# Patient Record
Sex: MALE | Race: WHITE | Employment: OTHER | URBAN - METROPOLITAN AREA
[De-identification: names, ages, dates, MRNs, and addresses within clinical notes are randomized per-mention and may not be internally consistent; named-entity substitution may affect disease eponyms.]

---

## 2017-01-03 ENCOUNTER — OFFICE VISIT (OUTPATIENT)
Dept: ORTHOPEDIC SURGERY | Age: 82
End: 2017-01-03

## 2017-01-03 VITALS
SYSTOLIC BLOOD PRESSURE: 121 MMHG | WEIGHT: 207 LBS | DIASTOLIC BLOOD PRESSURE: 78 MMHG | HEIGHT: 70 IN | BODY MASS INDEX: 29.63 KG/M2

## 2017-01-03 DIAGNOSIS — M24.562 CONTRACTURE OF LEFT KNEE: ICD-10-CM

## 2017-01-03 DIAGNOSIS — M25.469 EFFUSION OF KNEE, UNSPECIFIED LATERALITY: ICD-10-CM

## 2017-01-03 DIAGNOSIS — M17.12 PRIMARY OSTEOARTHRITIS OF LEFT KNEE: Primary | ICD-10-CM

## 2017-01-03 DIAGNOSIS — M25.569 ACUTE KNEE PAIN, UNSPECIFIED LATERALITY: ICD-10-CM

## 2017-01-03 PROCEDURE — 99214 OFFICE O/P EST MOD 30 MIN: CPT | Performed by: INTERNAL MEDICINE

## 2017-01-03 PROCEDURE — 73562 X-RAY EXAM OF KNEE 3: CPT | Performed by: INTERNAL MEDICINE

## 2017-01-03 PROCEDURE — 20611 DRAIN/INJ JOINT/BURSA W/US: CPT | Performed by: INTERNAL MEDICINE

## 2017-01-17 ENCOUNTER — OFFICE VISIT (OUTPATIENT)
Dept: ORTHOPEDIC SURGERY | Age: 82
End: 2017-01-17

## 2017-01-17 VITALS — HEIGHT: 70 IN | WEIGHT: 207 LBS | BODY MASS INDEX: 29.63 KG/M2

## 2017-01-17 DIAGNOSIS — M24.562 CONTRACTURE OF LEFT KNEE: ICD-10-CM

## 2017-01-17 DIAGNOSIS — S83.8X2D SPRAIN OF OTHER LIGAMENT OF LEFT KNEE, SUBSEQUENT ENCOUNTER: ICD-10-CM

## 2017-01-17 DIAGNOSIS — M17.12 PRIMARY OSTEOARTHRITIS OF LEFT KNEE: Primary | ICD-10-CM

## 2017-01-17 PROCEDURE — G8420 CALC BMI NORM PARAMETERS: HCPCS | Performed by: INTERNAL MEDICINE

## 2017-01-17 PROCEDURE — 4040F PNEUMOC VAC/ADMIN/RCVD: CPT | Performed by: INTERNAL MEDICINE

## 2017-01-17 PROCEDURE — G8427 DOCREV CUR MEDS BY ELIG CLIN: HCPCS | Performed by: INTERNAL MEDICINE

## 2017-01-17 PROCEDURE — 1123F ACP DISCUSS/DSCN MKR DOCD: CPT | Performed by: INTERNAL MEDICINE

## 2017-01-17 PROCEDURE — L1812 KO ELASTIC W/JOINTS PRE OTS: HCPCS | Performed by: INTERNAL MEDICINE

## 2017-01-17 PROCEDURE — 1036F TOBACCO NON-USER: CPT | Performed by: INTERNAL MEDICINE

## 2017-01-17 PROCEDURE — G8484 FLU IMMUNIZE NO ADMIN: HCPCS | Performed by: INTERNAL MEDICINE

## 2017-01-17 PROCEDURE — 99213 OFFICE O/P EST LOW 20 MIN: CPT | Performed by: INTERNAL MEDICINE

## 2017-01-19 ENCOUNTER — OFFICE VISIT (OUTPATIENT)
Dept: INTERNAL MEDICINE | Age: 82
End: 2017-01-19

## 2017-01-19 VITALS
BODY MASS INDEX: 29.41 KG/M2 | SYSTOLIC BLOOD PRESSURE: 116 MMHG | TEMPERATURE: 97.6 F | DIASTOLIC BLOOD PRESSURE: 54 MMHG | HEART RATE: 68 BPM | RESPIRATION RATE: 16 BRPM | WEIGHT: 205 LBS

## 2017-01-19 DIAGNOSIS — I48.0 PAROXYSMAL ATRIAL FIBRILLATION (HCC): ICD-10-CM

## 2017-01-19 DIAGNOSIS — M17.11 PRIMARY OSTEOARTHRITIS OF RIGHT KNEE: ICD-10-CM

## 2017-01-19 DIAGNOSIS — I10 HTN (HYPERTENSION), BENIGN: Primary | ICD-10-CM

## 2017-01-19 DIAGNOSIS — E78.00 PURE HYPERCHOLESTEROLEMIA: ICD-10-CM

## 2017-01-19 DIAGNOSIS — E03.9 ACQUIRED HYPOTHYROIDISM: ICD-10-CM

## 2017-01-19 DIAGNOSIS — D64.9 ANEMIA, UNSPECIFIED TYPE: Primary | ICD-10-CM

## 2017-01-19 DIAGNOSIS — F32.5 MAJOR DEPRESSIVE DISORDER WITH SINGLE EPISODE, IN FULL REMISSION (HCC): ICD-10-CM

## 2017-01-19 DIAGNOSIS — N18.30 CKD (CHRONIC KIDNEY DISEASE) STAGE 3, GFR 30-59 ML/MIN (HCC): ICD-10-CM

## 2017-01-19 LAB
ALBUMIN SERPL-MCNC: 4.1 G/DL (ref 3.4–5)
ALP BLD-CCNC: 49 U/L (ref 40–129)
ALT SERPL-CCNC: 12 U/L (ref 10–40)
ANION GAP SERPL CALCULATED.3IONS-SCNC: 13 MMOL/L (ref 3–16)
AST SERPL-CCNC: 17 U/L (ref 15–37)
BASOPHILS ABSOLUTE: 0.1 K/UL (ref 0–0.2)
BASOPHILS RELATIVE PERCENT: 0.9 %
BILIRUB SERPL-MCNC: 0.6 MG/DL (ref 0–1)
BILIRUBIN DIRECT: <0.2 MG/DL (ref 0–0.3)
BILIRUBIN, INDIRECT: NORMAL MG/DL (ref 0–1)
BUN BLDV-MCNC: 25 MG/DL (ref 7–20)
CALCIUM SERPL-MCNC: 9.3 MG/DL (ref 8.3–10.6)
CHLORIDE BLD-SCNC: 103 MMOL/L (ref 99–110)
CHOLESTEROL, TOTAL: 153 MG/DL (ref 0–199)
CO2: 26 MMOL/L (ref 21–32)
CREAT SERPL-MCNC: 1.4 MG/DL (ref 0.8–1.3)
DIGOXIN LEVEL: 1 NG/ML (ref 0.8–2)
EOSINOPHILS ABSOLUTE: 0.1 K/UL (ref 0–0.6)
EOSINOPHILS RELATIVE PERCENT: 1.5 %
FERRITIN: 116.1 NG/ML (ref 30–400)
GFR AFRICAN AMERICAN: 59
GFR NON-AFRICAN AMERICAN: 48
GLUCOSE BLD-MCNC: 81 MG/DL (ref 70–99)
HCT VFR BLD CALC: 36.1 % (ref 40.5–52.5)
HDLC SERPL-MCNC: 53 MG/DL (ref 40–60)
HEMOGLOBIN: 12 G/DL (ref 13.5–17.5)
IRON SATURATION: 21 % (ref 20–50)
IRON: 62 UG/DL (ref 59–158)
LDL CHOLESTEROL CALCULATED: 84 MG/DL
LYMPHOCYTES ABSOLUTE: 0.8 K/UL (ref 1–5.1)
LYMPHOCYTES RELATIVE PERCENT: 11.2 %
MCH RBC QN AUTO: 29.6 PG (ref 26–34)
MCHC RBC AUTO-ENTMCNC: 33.3 G/DL (ref 31–36)
MCV RBC AUTO: 88.7 FL (ref 80–100)
MONOCYTES ABSOLUTE: 0.7 K/UL (ref 0–1.3)
MONOCYTES RELATIVE PERCENT: 10.7 %
NEUTROPHILS ABSOLUTE: 5.3 K/UL (ref 1.7–7.7)
NEUTROPHILS RELATIVE PERCENT: 75.7 %
PDW BLD-RTO: 14.9 % (ref 12.4–15.4)
PHOSPHORUS: 2.9 MG/DL (ref 2.5–4.9)
PLATELET # BLD: 219 K/UL (ref 135–450)
PMV BLD AUTO: 9 FL (ref 5–10.5)
POTASSIUM SERPL-SCNC: 4.9 MMOL/L (ref 3.5–5.1)
RBC # BLD: 4.07 M/UL (ref 4.2–5.9)
SODIUM BLD-SCNC: 142 MMOL/L (ref 136–145)
TOTAL IRON BINDING CAPACITY: 292 UG/DL (ref 260–445)
TOTAL PROTEIN: 6.4 G/DL (ref 6.4–8.2)
TRIGL SERPL-MCNC: 78 MG/DL (ref 0–150)
TSH REFLEX: 1.35 UIU/ML (ref 0.27–4.2)
VLDLC SERPL CALC-MCNC: 16 MG/DL
WBC # BLD: 7 K/UL (ref 4–11)

## 2017-01-19 PROCEDURE — 36415 COLL VENOUS BLD VENIPUNCTURE: CPT | Performed by: INTERNAL MEDICINE

## 2017-01-19 PROCEDURE — 99214 OFFICE O/P EST MOD 30 MIN: CPT | Performed by: INTERNAL MEDICINE

## 2017-01-19 PROCEDURE — 4040F PNEUMOC VAC/ADMIN/RCVD: CPT | Performed by: INTERNAL MEDICINE

## 2017-01-19 PROCEDURE — G8484 FLU IMMUNIZE NO ADMIN: HCPCS | Performed by: INTERNAL MEDICINE

## 2017-01-19 PROCEDURE — G8420 CALC BMI NORM PARAMETERS: HCPCS | Performed by: INTERNAL MEDICINE

## 2017-01-19 PROCEDURE — 1036F TOBACCO NON-USER: CPT | Performed by: INTERNAL MEDICINE

## 2017-01-19 PROCEDURE — 1123F ACP DISCUSS/DSCN MKR DOCD: CPT | Performed by: INTERNAL MEDICINE

## 2017-01-19 PROCEDURE — G8427 DOCREV CUR MEDS BY ELIG CLIN: HCPCS | Performed by: INTERNAL MEDICINE

## 2017-02-07 ENCOUNTER — OFFICE VISIT (OUTPATIENT)
Dept: ORTHOPEDIC SURGERY | Age: 82
End: 2017-02-07

## 2017-02-07 DIAGNOSIS — M25.462 KNEE EFFUSION, LEFT: ICD-10-CM

## 2017-02-07 DIAGNOSIS — M17.12 PRIMARY OSTEOARTHRITIS OF ONE KNEE, LEFT: Primary | ICD-10-CM

## 2017-02-07 PROCEDURE — G8420 CALC BMI NORM PARAMETERS: HCPCS | Performed by: INTERNAL MEDICINE

## 2017-02-07 PROCEDURE — 99212 OFFICE O/P EST SF 10 MIN: CPT | Performed by: INTERNAL MEDICINE

## 2017-02-07 PROCEDURE — 20611 DRAIN/INJ JOINT/BURSA W/US: CPT | Performed by: INTERNAL MEDICINE

## 2017-02-07 PROCEDURE — 4040F PNEUMOC VAC/ADMIN/RCVD: CPT | Performed by: INTERNAL MEDICINE

## 2017-02-07 PROCEDURE — 1123F ACP DISCUSS/DSCN MKR DOCD: CPT | Performed by: INTERNAL MEDICINE

## 2017-02-07 PROCEDURE — G8428 CUR MEDS NOT DOCUMENT: HCPCS | Performed by: INTERNAL MEDICINE

## 2017-02-07 PROCEDURE — 1036F TOBACCO NON-USER: CPT | Performed by: INTERNAL MEDICINE

## 2017-02-07 PROCEDURE — G8484 FLU IMMUNIZE NO ADMIN: HCPCS | Performed by: INTERNAL MEDICINE

## 2017-02-08 PROBLEM — M25.462 KNEE EFFUSION, LEFT: Status: ACTIVE | Noted: 2017-02-08

## 2017-02-15 ENCOUNTER — OFFICE VISIT (OUTPATIENT)
Dept: ORTHOPEDIC SURGERY | Age: 82
End: 2017-02-15

## 2017-02-15 VITALS — HEIGHT: 70 IN | WEIGHT: 207 LBS | BODY MASS INDEX: 29.63 KG/M2

## 2017-02-15 DIAGNOSIS — M71.22 POPLITEAL SYNOVIAL CYST, LEFT: ICD-10-CM

## 2017-02-15 DIAGNOSIS — M17.12 PRIMARY OSTEOARTHRITIS OF LEFT KNEE: ICD-10-CM

## 2017-02-15 DIAGNOSIS — M25.462 KNEE EFFUSION, LEFT: ICD-10-CM

## 2017-02-15 DIAGNOSIS — M25.562 POSTERIOR KNEE PAIN, LEFT: Primary | ICD-10-CM

## 2017-02-15 PROCEDURE — 1123F ACP DISCUSS/DSCN MKR DOCD: CPT | Performed by: INTERNAL MEDICINE

## 2017-02-15 PROCEDURE — 99212 OFFICE O/P EST SF 10 MIN: CPT | Performed by: INTERNAL MEDICINE

## 2017-02-15 PROCEDURE — 1036F TOBACCO NON-USER: CPT | Performed by: INTERNAL MEDICINE

## 2017-02-15 PROCEDURE — 4040F PNEUMOC VAC/ADMIN/RCVD: CPT | Performed by: INTERNAL MEDICINE

## 2017-02-15 PROCEDURE — 20611 DRAIN/INJ JOINT/BURSA W/US: CPT | Performed by: INTERNAL MEDICINE

## 2017-02-15 PROCEDURE — G8427 DOCREV CUR MEDS BY ELIG CLIN: HCPCS | Performed by: INTERNAL MEDICINE

## 2017-02-15 PROCEDURE — 76882 US LMTD JT/FCL EVL NVASC XTR: CPT | Performed by: INTERNAL MEDICINE

## 2017-02-15 PROCEDURE — G8484 FLU IMMUNIZE NO ADMIN: HCPCS | Performed by: INTERNAL MEDICINE

## 2017-02-15 PROCEDURE — G8420 CALC BMI NORM PARAMETERS: HCPCS | Performed by: INTERNAL MEDICINE

## 2017-02-22 ENCOUNTER — OFFICE VISIT (OUTPATIENT)
Dept: ORTHOPEDIC SURGERY | Age: 82
End: 2017-02-22

## 2017-02-22 VITALS — HEIGHT: 70 IN | WEIGHT: 207.01 LBS | BODY MASS INDEX: 29.64 KG/M2

## 2017-02-22 DIAGNOSIS — M17.12 PRIMARY OSTEOARTHRITIS OF LEFT KNEE: Primary | ICD-10-CM

## 2017-02-22 DIAGNOSIS — M25.462 KNEE EFFUSION, LEFT: ICD-10-CM

## 2017-02-22 DIAGNOSIS — M25.562 POSTERIOR LEFT KNEE PAIN: ICD-10-CM

## 2017-02-22 PROCEDURE — 1123F ACP DISCUSS/DSCN MKR DOCD: CPT | Performed by: INTERNAL MEDICINE

## 2017-02-22 PROCEDURE — 4040F PNEUMOC VAC/ADMIN/RCVD: CPT | Performed by: INTERNAL MEDICINE

## 2017-02-22 PROCEDURE — G8484 FLU IMMUNIZE NO ADMIN: HCPCS | Performed by: INTERNAL MEDICINE

## 2017-02-22 PROCEDURE — 1036F TOBACCO NON-USER: CPT | Performed by: INTERNAL MEDICINE

## 2017-02-22 PROCEDURE — 99213 OFFICE O/P EST LOW 20 MIN: CPT | Performed by: INTERNAL MEDICINE

## 2017-02-22 PROCEDURE — G8427 DOCREV CUR MEDS BY ELIG CLIN: HCPCS | Performed by: INTERNAL MEDICINE

## 2017-02-22 PROCEDURE — 20611 DRAIN/INJ JOINT/BURSA W/US: CPT | Performed by: INTERNAL MEDICINE

## 2017-02-22 PROCEDURE — G8420 CALC BMI NORM PARAMETERS: HCPCS | Performed by: INTERNAL MEDICINE

## 2017-02-22 RX ORDER — MELOXICAM 15 MG/1
15 TABLET ORAL DAILY
Qty: 30 TABLET | Refills: 2 | Status: SHIPPED | OUTPATIENT
Start: 2017-02-22 | End: 2017-04-05 | Stop reason: ALTCHOICE

## 2017-03-02 ENCOUNTER — OFFICE VISIT (OUTPATIENT)
Dept: ORTHOPEDIC SURGERY | Age: 82
End: 2017-03-02

## 2017-03-02 VITALS — HEIGHT: 70 IN | BODY MASS INDEX: 29.64 KG/M2 | WEIGHT: 207.01 LBS

## 2017-03-02 DIAGNOSIS — M17.12 PRIMARY OSTEOARTHRITIS OF LEFT KNEE: Primary | ICD-10-CM

## 2017-03-02 DIAGNOSIS — M25.462 EFFUSION OF LEFT KNEE: ICD-10-CM

## 2017-03-02 PROBLEM — M25.562 POSTERIOR LEFT KNEE PAIN: Status: RESOLVED | Noted: 2017-02-22 | Resolved: 2017-03-02

## 2017-03-02 PROCEDURE — 1123F ACP DISCUSS/DSCN MKR DOCD: CPT | Performed by: INTERNAL MEDICINE

## 2017-03-02 PROCEDURE — 1036F TOBACCO NON-USER: CPT | Performed by: INTERNAL MEDICINE

## 2017-03-02 PROCEDURE — G8420 CALC BMI NORM PARAMETERS: HCPCS | Performed by: INTERNAL MEDICINE

## 2017-03-02 PROCEDURE — G8484 FLU IMMUNIZE NO ADMIN: HCPCS | Performed by: INTERNAL MEDICINE

## 2017-03-02 PROCEDURE — 99213 OFFICE O/P EST LOW 20 MIN: CPT | Performed by: INTERNAL MEDICINE

## 2017-03-02 PROCEDURE — G8427 DOCREV CUR MEDS BY ELIG CLIN: HCPCS | Performed by: INTERNAL MEDICINE

## 2017-03-02 PROCEDURE — 4040F PNEUMOC VAC/ADMIN/RCVD: CPT | Performed by: INTERNAL MEDICINE

## 2017-03-15 ENCOUNTER — HOSPITAL ENCOUNTER (OUTPATIENT)
Dept: PHYSICAL THERAPY | Age: 82
Discharge: HOME | End: 2017-03-31
Admitting: INTERNAL MEDICINE

## 2017-03-17 ENCOUNTER — HOSPITAL ENCOUNTER (OUTPATIENT)
Dept: PHYSICAL THERAPY | Age: 82
Discharge: HOME | End: 2017-03-17
Admitting: INTERNAL MEDICINE

## 2017-03-22 ENCOUNTER — HOSPITAL ENCOUNTER (OUTPATIENT)
Dept: PHYSICAL THERAPY | Age: 82
Discharge: HOME | End: 2017-03-22
Admitting: INTERNAL MEDICINE

## 2017-03-24 ENCOUNTER — HOSPITAL ENCOUNTER (OUTPATIENT)
Dept: PHYSICAL THERAPY | Age: 82
Discharge: HOME | End: 2017-03-24
Admitting: INTERNAL MEDICINE

## 2017-03-29 ENCOUNTER — HOSPITAL ENCOUNTER (OUTPATIENT)
Dept: PHYSICAL THERAPY | Age: 82
Discharge: HOME | End: 2017-03-29
Admitting: INTERNAL MEDICINE

## 2017-03-31 ENCOUNTER — HOSPITAL ENCOUNTER (OUTPATIENT)
Dept: PHYSICAL THERAPY | Age: 82
Discharge: HOME | End: 2017-03-31
Admitting: INTERNAL MEDICINE

## 2017-04-05 ENCOUNTER — OFFICE VISIT (OUTPATIENT)
Dept: ORTHOPEDIC SURGERY | Age: 82
End: 2017-04-05

## 2017-04-05 VITALS — HEIGHT: 70 IN | BODY MASS INDEX: 29.64 KG/M2 | WEIGHT: 207.01 LBS

## 2017-04-05 DIAGNOSIS — M24.562 CONTRACTURE OF LEFT KNEE: ICD-10-CM

## 2017-04-05 DIAGNOSIS — M25.562 LEFT KNEE PAIN, UNSPECIFIED CHRONICITY: Primary | ICD-10-CM

## 2017-04-05 DIAGNOSIS — M17.11 PRIMARY OSTEOARTHRITIS OF RIGHT KNEE: ICD-10-CM

## 2017-04-05 PROCEDURE — 99213 OFFICE O/P EST LOW 20 MIN: CPT | Performed by: INTERNAL MEDICINE

## 2017-04-05 PROCEDURE — 4040F PNEUMOC VAC/ADMIN/RCVD: CPT | Performed by: INTERNAL MEDICINE

## 2017-04-05 PROCEDURE — G8427 DOCREV CUR MEDS BY ELIG CLIN: HCPCS | Performed by: INTERNAL MEDICINE

## 2017-04-05 PROCEDURE — 1123F ACP DISCUSS/DSCN MKR DOCD: CPT | Performed by: INTERNAL MEDICINE

## 2017-04-05 PROCEDURE — 1036F TOBACCO NON-USER: CPT | Performed by: INTERNAL MEDICINE

## 2017-04-05 PROCEDURE — G8420 CALC BMI NORM PARAMETERS: HCPCS | Performed by: INTERNAL MEDICINE

## 2017-04-05 RX ORDER — METHYLPREDNISOLONE 4 MG/1
TABLET ORAL
Qty: 1 KIT | Refills: 0 | Status: SHIPPED | OUTPATIENT
Start: 2017-04-05 | End: 2017-05-04 | Stop reason: CLARIF

## 2017-04-07 PROBLEM — M24.569 CONTRACTURE OF KNEE JOINT: Status: ACTIVE | Noted: 2017-04-07

## 2017-04-26 ENCOUNTER — OFFICE VISIT (OUTPATIENT)
Dept: ORTHOPEDIC SURGERY | Age: 82
End: 2017-04-26

## 2017-04-26 VITALS — WEIGHT: 207.01 LBS | HEIGHT: 70 IN | BODY MASS INDEX: 29.64 KG/M2

## 2017-04-26 DIAGNOSIS — M17.11 PRIMARY OSTEOARTHRITIS OF RIGHT KNEE: Primary | ICD-10-CM

## 2017-04-26 DIAGNOSIS — M24.562 CONTRACTURE OF LEFT KNEE: ICD-10-CM

## 2017-04-26 DIAGNOSIS — M25.562 ACUTE PAIN OF LEFT KNEE: ICD-10-CM

## 2017-04-26 PROCEDURE — 99213 OFFICE O/P EST LOW 20 MIN: CPT | Performed by: INTERNAL MEDICINE

## 2017-04-26 PROCEDURE — 1036F TOBACCO NON-USER: CPT | Performed by: INTERNAL MEDICINE

## 2017-04-26 PROCEDURE — G8420 CALC BMI NORM PARAMETERS: HCPCS | Performed by: INTERNAL MEDICINE

## 2017-04-26 PROCEDURE — 4040F PNEUMOC VAC/ADMIN/RCVD: CPT | Performed by: INTERNAL MEDICINE

## 2017-04-26 PROCEDURE — G8427 DOCREV CUR MEDS BY ELIG CLIN: HCPCS | Performed by: INTERNAL MEDICINE

## 2017-04-26 PROCEDURE — 1123F ACP DISCUSS/DSCN MKR DOCD: CPT | Performed by: INTERNAL MEDICINE

## 2017-04-26 RX ORDER — ACETAMINOPHEN AND CODEINE PHOSPHATE 300; 30 MG/1; MG/1
1 TABLET ORAL EVERY 4 HOURS PRN
Qty: 20 TABLET | Refills: 0 | Status: SHIPPED | OUTPATIENT
Start: 2017-04-26 | End: 2017-05-26

## 2017-05-04 ENCOUNTER — OFFICE VISIT (OUTPATIENT)
Dept: INTERNAL MEDICINE | Age: 82
End: 2017-05-04

## 2017-05-04 VITALS
DIASTOLIC BLOOD PRESSURE: 46 MMHG | RESPIRATION RATE: 16 BRPM | WEIGHT: 203 LBS | SYSTOLIC BLOOD PRESSURE: 106 MMHG | BODY MASS INDEX: 29.06 KG/M2 | HEART RATE: 60 BPM | TEMPERATURE: 96.7 F

## 2017-05-04 DIAGNOSIS — E03.9 ACQUIRED HYPOTHYROIDISM: ICD-10-CM

## 2017-05-04 DIAGNOSIS — F33.0 MILD EPISODE OF RECURRENT MAJOR DEPRESSIVE DISORDER (HCC): ICD-10-CM

## 2017-05-04 DIAGNOSIS — I10 HTN (HYPERTENSION), BENIGN: Primary | ICD-10-CM

## 2017-05-04 DIAGNOSIS — N18.30 CKD (CHRONIC KIDNEY DISEASE) STAGE 3, GFR 30-59 ML/MIN (HCC): ICD-10-CM

## 2017-05-04 DIAGNOSIS — I48.0 PAROXYSMAL ATRIAL FIBRILLATION (HCC): ICD-10-CM

## 2017-05-04 PROCEDURE — 1036F TOBACCO NON-USER: CPT | Performed by: INTERNAL MEDICINE

## 2017-05-04 PROCEDURE — 1123F ACP DISCUSS/DSCN MKR DOCD: CPT | Performed by: INTERNAL MEDICINE

## 2017-05-04 PROCEDURE — 4040F PNEUMOC VAC/ADMIN/RCVD: CPT | Performed by: INTERNAL MEDICINE

## 2017-05-04 PROCEDURE — 36415 COLL VENOUS BLD VENIPUNCTURE: CPT | Performed by: INTERNAL MEDICINE

## 2017-05-04 PROCEDURE — G8427 DOCREV CUR MEDS BY ELIG CLIN: HCPCS | Performed by: INTERNAL MEDICINE

## 2017-05-04 PROCEDURE — G8420 CALC BMI NORM PARAMETERS: HCPCS | Performed by: INTERNAL MEDICINE

## 2017-05-04 PROCEDURE — 99214 OFFICE O/P EST MOD 30 MIN: CPT | Performed by: INTERNAL MEDICINE

## 2017-05-04 ASSESSMENT — PATIENT HEALTH QUESTIONNAIRE - PHQ9
SUM OF ALL RESPONSES TO PHQ9 QUESTIONS 1 & 2: 0
2. FEELING DOWN, DEPRESSED OR HOPELESS: 0
1. LITTLE INTEREST OR PLEASURE IN DOING THINGS: 0
SUM OF ALL RESPONSES TO PHQ QUESTIONS 1-9: 0

## 2017-05-05 LAB
ALBUMIN SERPL-MCNC: 4.4 G/DL (ref 3.4–5)
ANION GAP SERPL CALCULATED.3IONS-SCNC: 16 MMOL/L (ref 3–16)
BASOPHILS ABSOLUTE: 0 K/UL (ref 0–0.2)
BASOPHILS RELATIVE PERCENT: 0.6 %
BUN BLDV-MCNC: 30 MG/DL (ref 7–20)
CALCIUM SERPL-MCNC: 9.2 MG/DL (ref 8.3–10.6)
CHLORIDE BLD-SCNC: 100 MMOL/L (ref 99–110)
CO2: 26 MMOL/L (ref 21–32)
CREAT SERPL-MCNC: 1.3 MG/DL (ref 0.8–1.3)
EOSINOPHILS ABSOLUTE: 0.2 K/UL (ref 0–0.6)
EOSINOPHILS RELATIVE PERCENT: 2.6 %
GFR AFRICAN AMERICAN: >60
GFR NON-AFRICAN AMERICAN: 53
GLUCOSE BLD-MCNC: 96 MG/DL (ref 70–99)
HCT VFR BLD CALC: 37.2 % (ref 40.5–52.5)
HEMOGLOBIN: 12.2 G/DL (ref 13.5–17.5)
LYMPHOCYTES ABSOLUTE: 0.9 K/UL (ref 1–5.1)
LYMPHOCYTES RELATIVE PERCENT: 14.1 %
MCH RBC QN AUTO: 29.4 PG (ref 26–34)
MCHC RBC AUTO-ENTMCNC: 32.8 G/DL (ref 31–36)
MCV RBC AUTO: 89.7 FL (ref 80–100)
MONOCYTES ABSOLUTE: 0.7 K/UL (ref 0–1.3)
MONOCYTES RELATIVE PERCENT: 10.7 %
NEUTROPHILS ABSOLUTE: 4.8 K/UL (ref 1.7–7.7)
NEUTROPHILS RELATIVE PERCENT: 72 %
PDW BLD-RTO: 14.5 % (ref 12.4–15.4)
PHOSPHORUS: 3.6 MG/DL (ref 2.5–4.9)
PLATELET # BLD: 254 K/UL (ref 135–450)
PMV BLD AUTO: 9.3 FL (ref 5–10.5)
POTASSIUM SERPL-SCNC: 4.7 MMOL/L (ref 3.5–5.1)
RBC # BLD: 4.14 M/UL (ref 4.2–5.9)
SODIUM BLD-SCNC: 142 MMOL/L (ref 136–145)
WBC # BLD: 6.7 K/UL (ref 4–11)

## 2017-06-06 RX ORDER — LEVOTHYROXINE SODIUM 88 MCG
TABLET ORAL
Qty: 90 TABLET | Refills: 1 | Status: SHIPPED | OUTPATIENT
Start: 2017-06-06 | End: 2017-12-12 | Stop reason: SDUPTHER

## 2017-07-24 ENCOUNTER — OFFICE VISIT (OUTPATIENT)
Dept: INTERNAL MEDICINE | Age: 82
End: 2017-07-24

## 2017-07-24 VITALS
RESPIRATION RATE: 16 BRPM | DIASTOLIC BLOOD PRESSURE: 50 MMHG | BODY MASS INDEX: 29.49 KG/M2 | TEMPERATURE: 97 F | WEIGHT: 206 LBS | HEART RATE: 60 BPM | SYSTOLIC BLOOD PRESSURE: 114 MMHG

## 2017-07-24 DIAGNOSIS — E78.00 PURE HYPERCHOLESTEROLEMIA: ICD-10-CM

## 2017-07-24 DIAGNOSIS — E03.9 ACQUIRED HYPOTHYROIDISM: ICD-10-CM

## 2017-07-24 DIAGNOSIS — I10 HTN (HYPERTENSION), BENIGN: Primary | ICD-10-CM

## 2017-07-24 DIAGNOSIS — I48.0 PAROXYSMAL ATRIAL FIBRILLATION (HCC): ICD-10-CM

## 2017-07-24 DIAGNOSIS — F32.5 MAJOR DEPRESSIVE DISORDER WITH SINGLE EPISODE, IN FULL REMISSION (HCC): ICD-10-CM

## 2017-07-24 DIAGNOSIS — N18.30 CKD (CHRONIC KIDNEY DISEASE) STAGE 3, GFR 30-59 ML/MIN (HCC): ICD-10-CM

## 2017-07-24 LAB
ALBUMIN SERPL-MCNC: 4.1 G/DL (ref 3.4–5)
ALP BLD-CCNC: 50 U/L (ref 40–129)
ALT SERPL-CCNC: 10 U/L (ref 10–40)
ANION GAP SERPL CALCULATED.3IONS-SCNC: 14 MMOL/L (ref 3–16)
AST SERPL-CCNC: 15 U/L (ref 15–37)
BASOPHILS ABSOLUTE: 0.2 K/UL (ref 0–0.2)
BASOPHILS RELATIVE PERCENT: 2.3 %
BILIRUB SERPL-MCNC: 0.6 MG/DL (ref 0–1)
BILIRUBIN DIRECT: <0.2 MG/DL (ref 0–0.3)
BILIRUBIN, INDIRECT: ABNORMAL MG/DL (ref 0–1)
BUN BLDV-MCNC: 21 MG/DL (ref 7–20)
CALCIUM SERPL-MCNC: 8.8 MG/DL (ref 8.3–10.6)
CHLORIDE BLD-SCNC: 104 MMOL/L (ref 99–110)
CHOLESTEROL, TOTAL: 140 MG/DL (ref 0–199)
CO2: 24 MMOL/L (ref 21–32)
CREAT SERPL-MCNC: 1.2 MG/DL (ref 0.8–1.3)
DIGOXIN LEVEL: 1.2 NG/ML (ref 0.8–2)
EOSINOPHILS ABSOLUTE: 0.1 K/UL (ref 0–0.6)
EOSINOPHILS RELATIVE PERCENT: 0.9 %
GFR AFRICAN AMERICAN: >60
GFR NON-AFRICAN AMERICAN: 58
GLUCOSE BLD-MCNC: 106 MG/DL (ref 70–99)
HCT VFR BLD CALC: 36.3 % (ref 40.5–52.5)
HDLC SERPL-MCNC: 46 MG/DL (ref 40–60)
HEMOGLOBIN: 12.1 G/DL (ref 13.5–17.5)
LDL CHOLESTEROL CALCULATED: 79 MG/DL
LYMPHOCYTES ABSOLUTE: 1 K/UL (ref 1–5.1)
LYMPHOCYTES RELATIVE PERCENT: 13 %
MCH RBC QN AUTO: 29.9 PG (ref 26–34)
MCHC RBC AUTO-ENTMCNC: 33.5 G/DL (ref 31–36)
MCV RBC AUTO: 89.4 FL (ref 80–100)
MONOCYTES ABSOLUTE: 0.7 K/UL (ref 0–1.3)
MONOCYTES RELATIVE PERCENT: 9.4 %
NEUTROPHILS ABSOLUTE: 5.4 K/UL (ref 1.7–7.7)
NEUTROPHILS RELATIVE PERCENT: 74.4 %
PARATHYROID HORMONE INTACT: 32.2 PG/ML (ref 14–72)
PDW BLD-RTO: 15.1 % (ref 12.4–15.4)
PHOSPHORUS: 2.9 MG/DL (ref 2.5–4.9)
PLATELET # BLD: 227 K/UL (ref 135–450)
PMV BLD AUTO: 9.6 FL (ref 5–10.5)
POTASSIUM SERPL-SCNC: 4.4 MMOL/L (ref 3.5–5.1)
RBC # BLD: 4.06 M/UL (ref 4.2–5.9)
SODIUM BLD-SCNC: 142 MMOL/L (ref 136–145)
TOTAL PROTEIN: 6.3 G/DL (ref 6.4–8.2)
TRIGL SERPL-MCNC: 76 MG/DL (ref 0–150)
TSH REFLEX: 1.35 UIU/ML (ref 0.27–4.2)
VLDLC SERPL CALC-MCNC: 15 MG/DL
WBC # BLD: 7.3 K/UL (ref 4–11)

## 2017-07-24 PROCEDURE — 4040F PNEUMOC VAC/ADMIN/RCVD: CPT | Performed by: INTERNAL MEDICINE

## 2017-07-24 PROCEDURE — G8427 DOCREV CUR MEDS BY ELIG CLIN: HCPCS | Performed by: INTERNAL MEDICINE

## 2017-07-24 PROCEDURE — 1123F ACP DISCUSS/DSCN MKR DOCD: CPT | Performed by: INTERNAL MEDICINE

## 2017-07-24 PROCEDURE — 99214 OFFICE O/P EST MOD 30 MIN: CPT | Performed by: INTERNAL MEDICINE

## 2017-07-24 PROCEDURE — 3288F FALL RISK ASSESSMENT DOCD: CPT | Performed by: INTERNAL MEDICINE

## 2017-07-24 PROCEDURE — G8419 CALC BMI OUT NRM PARAM NOF/U: HCPCS | Performed by: INTERNAL MEDICINE

## 2017-07-24 PROCEDURE — 36415 COLL VENOUS BLD VENIPUNCTURE: CPT | Performed by: INTERNAL MEDICINE

## 2017-07-24 PROCEDURE — 1036F TOBACCO NON-USER: CPT | Performed by: INTERNAL MEDICINE

## 2017-08-30 ENCOUNTER — OFFICE VISIT (OUTPATIENT)
Dept: INTERNAL MEDICINE | Age: 82
End: 2017-08-30

## 2017-08-30 VITALS
TEMPERATURE: 98 F | WEIGHT: 210 LBS | DIASTOLIC BLOOD PRESSURE: 72 MMHG | HEIGHT: 70 IN | BODY MASS INDEX: 30.06 KG/M2 | OXYGEN SATURATION: 97 % | HEART RATE: 59 BPM | SYSTOLIC BLOOD PRESSURE: 140 MMHG

## 2017-08-30 DIAGNOSIS — E03.9 ACQUIRED HYPOTHYROIDISM: ICD-10-CM

## 2017-08-30 DIAGNOSIS — Z00.00 ROUTINE GENERAL MEDICAL EXAMINATION AT A HEALTH CARE FACILITY: Primary | ICD-10-CM

## 2017-08-30 DIAGNOSIS — I10 HTN (HYPERTENSION), BENIGN: ICD-10-CM

## 2017-08-30 DIAGNOSIS — I48.0 PAROXYSMAL ATRIAL FIBRILLATION (HCC): ICD-10-CM

## 2017-08-30 DIAGNOSIS — N18.30 CKD (CHRONIC KIDNEY DISEASE) STAGE 3, GFR 30-59 ML/MIN (HCC): ICD-10-CM

## 2017-08-30 PROCEDURE — G0439 PPPS, SUBSEQ VISIT: HCPCS | Performed by: NURSE PRACTITIONER

## 2017-08-30 ASSESSMENT — ANXIETY QUESTIONNAIRES: GAD7 TOTAL SCORE: 0

## 2017-08-30 ASSESSMENT — ENCOUNTER SYMPTOMS
BACK PAIN: 0
WHEEZING: 0
COLOR CHANGE: 0
BLOOD IN STOOL: 0
EYE REDNESS: 0
COUGH: 0
SINUS PRESSURE: 0
CHEST TIGHTNESS: 0
EYE ITCHING: 0
CONSTIPATION: 0
RHINORRHEA: 0
SHORTNESS OF BREATH: 0
SORE THROAT: 0
VOMITING: 0
DIARRHEA: 0
NAUSEA: 0
ABDOMINAL PAIN: 0

## 2017-08-30 ASSESSMENT — LIFESTYLE VARIABLES: HOW OFTEN DO YOU HAVE A DRINK CONTAINING ALCOHOL: 0

## 2017-08-30 ASSESSMENT — PATIENT HEALTH QUESTIONNAIRE - PHQ9: SUM OF ALL RESPONSES TO PHQ QUESTIONS 1-9: 0

## 2017-11-09 RX ORDER — RAMIPRIL 10 MG/1
CAPSULE ORAL
Qty: 90 CAPSULE | Refills: 0 | Status: SHIPPED | OUTPATIENT
Start: 2017-11-09

## 2017-12-12 ENCOUNTER — OFFICE VISIT (OUTPATIENT)
Dept: INTERNAL MEDICINE | Age: 82
End: 2017-12-12

## 2017-12-12 VITALS
BODY MASS INDEX: 28.47 KG/M2 | DIASTOLIC BLOOD PRESSURE: 68 MMHG | SYSTOLIC BLOOD PRESSURE: 124 MMHG | HEART RATE: 72 BPM | WEIGHT: 198.4 LBS

## 2017-12-12 DIAGNOSIS — Z12.5 ENCOUNTER FOR SCREENING FOR MALIGNANT NEOPLASM OF PROSTATE: ICD-10-CM

## 2017-12-12 DIAGNOSIS — E03.9 ACQUIRED HYPOTHYROIDISM: ICD-10-CM

## 2017-12-12 DIAGNOSIS — E78.00 PURE HYPERCHOLESTEROLEMIA: ICD-10-CM

## 2017-12-12 DIAGNOSIS — I10 HTN (HYPERTENSION), BENIGN: Primary | ICD-10-CM

## 2017-12-12 DIAGNOSIS — F32.5 MAJOR DEPRESSIVE DISORDER WITH SINGLE EPISODE, IN FULL REMISSION (HCC): ICD-10-CM

## 2017-12-12 DIAGNOSIS — I48.0 PAROXYSMAL ATRIAL FIBRILLATION (HCC): ICD-10-CM

## 2017-12-12 DIAGNOSIS — I10 HTN (HYPERTENSION), BENIGN: ICD-10-CM

## 2017-12-12 PROBLEM — M24.569 CONTRACTURE OF KNEE JOINT: Status: RESOLVED | Noted: 2017-04-07 | Resolved: 2017-12-12

## 2017-12-12 LAB
A/G RATIO: 1.8 (ref 1.1–2.2)
ALBUMIN SERPL-MCNC: 4.4 G/DL (ref 3.4–5)
ALP BLD-CCNC: 56 U/L (ref 40–129)
ALT SERPL-CCNC: 12 U/L (ref 10–40)
ANION GAP SERPL CALCULATED.3IONS-SCNC: 15 MMOL/L (ref 3–16)
AST SERPL-CCNC: 18 U/L (ref 15–37)
BASOPHILS ABSOLUTE: 0 K/UL (ref 0–0.2)
BASOPHILS RELATIVE PERCENT: 0.8 %
BILIRUB SERPL-MCNC: 1 MG/DL (ref 0–1)
BUN BLDV-MCNC: 27 MG/DL (ref 7–20)
CALCIUM SERPL-MCNC: 9.6 MG/DL (ref 8.3–10.6)
CHLORIDE BLD-SCNC: 102 MMOL/L (ref 99–110)
CHOLESTEROL, TOTAL: 160 MG/DL (ref 0–199)
CO2: 27 MMOL/L (ref 21–32)
CREAT SERPL-MCNC: 1.3 MG/DL (ref 0.8–1.3)
DIGOXIN LEVEL: 1.1 NG/ML (ref 0.8–2)
EOSINOPHILS ABSOLUTE: 0.1 K/UL (ref 0–0.6)
EOSINOPHILS RELATIVE PERCENT: 1.3 %
GFR AFRICAN AMERICAN: >60
GFR NON-AFRICAN AMERICAN: 53
GLOBULIN: 2.5 G/DL
GLUCOSE BLD-MCNC: 108 MG/DL (ref 70–99)
HCT VFR BLD CALC: 39 % (ref 40.5–52.5)
HDLC SERPL-MCNC: 54 MG/DL (ref 40–60)
HEMOGLOBIN: 12.6 G/DL (ref 13.5–17.5)
LDL CHOLESTEROL CALCULATED: 90 MG/DL
LYMPHOCYTES ABSOLUTE: 1 K/UL (ref 1–5.1)
LYMPHOCYTES RELATIVE PERCENT: 14.8 %
MCH RBC QN AUTO: 29.9 PG (ref 26–34)
MCHC RBC AUTO-ENTMCNC: 32.4 G/DL (ref 31–36)
MCV RBC AUTO: 92.2 FL (ref 80–100)
MONOCYTES ABSOLUTE: 0.6 K/UL (ref 0–1.3)
MONOCYTES RELATIVE PERCENT: 9.5 %
NEUTROPHILS ABSOLUTE: 4.9 K/UL (ref 1.7–7.7)
NEUTROPHILS RELATIVE PERCENT: 73.6 %
PDW BLD-RTO: 15.7 % (ref 12.4–15.4)
PLATELET # BLD: 231 K/UL (ref 135–450)
PMV BLD AUTO: 9.6 FL (ref 5–10.5)
POTASSIUM SERPL-SCNC: 4.3 MMOL/L (ref 3.5–5.1)
PROSTATE SPECIFIC ANTIGEN: 4.73 NG/ML (ref 0–4)
RBC # BLD: 4.23 M/UL (ref 4.2–5.9)
SODIUM BLD-SCNC: 144 MMOL/L (ref 136–145)
TOTAL PROTEIN: 6.9 G/DL (ref 6.4–8.2)
TRIGL SERPL-MCNC: 80 MG/DL (ref 0–150)
TSH SERPL DL<=0.05 MIU/L-ACNC: 1.58 UIU/ML (ref 0.27–4.2)
VLDLC SERPL CALC-MCNC: 16 MG/DL
WBC # BLD: 6.6 K/UL (ref 4–11)

## 2017-12-12 PROCEDURE — 4040F PNEUMOC VAC/ADMIN/RCVD: CPT | Performed by: INTERNAL MEDICINE

## 2017-12-12 PROCEDURE — G8427 DOCREV CUR MEDS BY ELIG CLIN: HCPCS | Performed by: INTERNAL MEDICINE

## 2017-12-12 PROCEDURE — 99214 OFFICE O/P EST MOD 30 MIN: CPT | Performed by: INTERNAL MEDICINE

## 2017-12-12 PROCEDURE — G8484 FLU IMMUNIZE NO ADMIN: HCPCS | Performed by: INTERNAL MEDICINE

## 2017-12-12 PROCEDURE — 1036F TOBACCO NON-USER: CPT | Performed by: INTERNAL MEDICINE

## 2017-12-12 PROCEDURE — G8417 CALC BMI ABV UP PARAM F/U: HCPCS | Performed by: INTERNAL MEDICINE

## 2017-12-12 PROCEDURE — 1123F ACP DISCUSS/DSCN MKR DOCD: CPT | Performed by: INTERNAL MEDICINE

## 2017-12-12 RX ORDER — ESCITALOPRAM OXALATE 10 MG/1
TABLET ORAL
Qty: 90 TABLET | Refills: 1 | Status: SHIPPED | OUTPATIENT
Start: 2017-12-12 | End: 2018-06-12 | Stop reason: SDUPTHER

## 2017-12-12 RX ORDER — LEVOTHYROXINE SODIUM 88 MCG
TABLET ORAL
Qty: 90 TABLET | Refills: 1 | Status: SHIPPED | OUTPATIENT
Start: 2017-12-12 | End: 2018-06-12 | Stop reason: SDUPTHER

## 2017-12-12 ASSESSMENT — ENCOUNTER SYMPTOMS
GASTROINTESTINAL NEGATIVE: 1
SHORTNESS OF BREATH: 0
WHEEZING: 0
RESPIRATORY NEGATIVE: 1
CHEST TIGHTNESS: 0

## 2017-12-13 LAB
BACTERIA: ABNORMAL /HPF
BILIRUBIN URINE: NEGATIVE
BLOOD, URINE: NEGATIVE
CLARITY: CLEAR
COLOR: YELLOW
EPITHELIAL CELLS, UA: 1 /HPF (ref 0–5)
GLUCOSE URINE: NEGATIVE MG/DL
HYALINE CASTS: 2 /LPF (ref 0–8)
KETONES, URINE: NEGATIVE MG/DL
LEUKOCYTE ESTERASE, URINE: NEGATIVE
MICROSCOPIC EXAMINATION: ABNORMAL
NITRITE, URINE: NEGATIVE
PH UA: 6
PROTEIN UA: NEGATIVE MG/DL
RBC UA: 0 /HPF (ref 0–4)
SPECIFIC GRAVITY UA: 1.01
UROBILINOGEN, URINE: 0.2 E.U./DL
WBC UA: 2 /HPF (ref 0–5)

## 2017-12-14 LAB — VITAMIN D 1,25-DIHYDROXY: 14.7 PG/ML (ref 19.9–79.3)

## 2017-12-19 RX ORDER — ERGOCALCIFEROL 1.25 MG/1
50000 CAPSULE ORAL WEEKLY
Qty: 12 CAPSULE | Refills: 0 | Status: SHIPPED | OUTPATIENT
Start: 2017-12-19 | End: 2021-02-04

## 2018-06-12 RX ORDER — LEVOTHYROXINE SODIUM 88 MCG
TABLET ORAL
Qty: 90 TABLET | Refills: 0 | Status: SHIPPED | OUTPATIENT
Start: 2018-06-12 | End: 2018-09-08 | Stop reason: SDUPTHER

## 2018-06-12 RX ORDER — ESCITALOPRAM OXALATE 10 MG/1
TABLET ORAL
Qty: 90 TABLET | Refills: 0 | Status: SHIPPED | OUTPATIENT
Start: 2018-06-12 | End: 2018-09-08 | Stop reason: SDUPTHER

## 2018-06-25 ENCOUNTER — OFFICE VISIT (OUTPATIENT)
Dept: INTERNAL MEDICINE | Age: 83
End: 2018-06-25

## 2018-06-25 ENCOUNTER — HOSPITAL ENCOUNTER (OUTPATIENT)
Dept: OTHER | Age: 83
Discharge: HOME | End: 2018-06-25
Attending: INTERNAL MEDICINE | Admitting: INTERNAL MEDICINE

## 2018-06-25 VITALS
HEART RATE: 70 BPM | DIASTOLIC BLOOD PRESSURE: 60 MMHG | OXYGEN SATURATION: 96 % | HEIGHT: 71 IN | SYSTOLIC BLOOD PRESSURE: 112 MMHG | WEIGHT: 188 LBS | BODY MASS INDEX: 26.32 KG/M2

## 2018-06-25 DIAGNOSIS — I10 HTN (HYPERTENSION), BENIGN: Primary | ICD-10-CM

## 2018-06-25 DIAGNOSIS — E03.9 ACQUIRED HYPOTHYROIDISM: ICD-10-CM

## 2018-06-25 DIAGNOSIS — F32.5 MAJOR DEPRESSIVE DISORDER WITH SINGLE EPISODE, IN FULL REMISSION (HCC): ICD-10-CM

## 2018-06-25 DIAGNOSIS — R82.90 ABNORMAL URINE: Primary | ICD-10-CM

## 2018-06-25 DIAGNOSIS — I48.0 PAROXYSMAL ATRIAL FIBRILLATION (HCC): ICD-10-CM

## 2018-06-25 DIAGNOSIS — E78.00 PURE HYPERCHOLESTEROLEMIA: ICD-10-CM

## 2018-06-25 DIAGNOSIS — M53.87 SCIATICA ASSOCIATED WITH DISORDER OF LUMBOSACRAL SPINE: ICD-10-CM

## 2018-06-25 DIAGNOSIS — I10 HTN (HYPERTENSION), BENIGN: ICD-10-CM

## 2018-06-25 LAB
A/G RATIO: 1.8 (ref 1.1–2.2)
ALBUMIN SERPL-MCNC: 4.4 G/DL (ref 3.4–5)
ALP BLD-CCNC: 57 U/L (ref 40–129)
ALT SERPL-CCNC: 9 U/L (ref 10–40)
ANION GAP SERPL CALCULATED.3IONS-SCNC: 17 MMOL/L (ref 3–16)
AST SERPL-CCNC: 15 U/L (ref 15–37)
BACTERIA: ABNORMAL /HPF
BASOPHILS ABSOLUTE: 0.1 K/UL (ref 0–0.2)
BASOPHILS RELATIVE PERCENT: 0.8 %
BILIRUB SERPL-MCNC: 0.7 MG/DL (ref 0–1)
BILIRUBIN URINE: NEGATIVE
BLOOD, URINE: ABNORMAL
BUN BLDV-MCNC: 30 MG/DL (ref 7–20)
CALCIUM SERPL-MCNC: 9.1 MG/DL (ref 8.3–10.6)
CHLORIDE BLD-SCNC: 102 MMOL/L (ref 99–110)
CHOLESTEROL, TOTAL: 144 MG/DL (ref 0–199)
CLARITY: ABNORMAL
CO2: 26 MMOL/L (ref 21–32)
COLOR: YELLOW
CREAT SERPL-MCNC: 1.4 MG/DL (ref 0.8–1.3)
CREATININE URINE: 134.6 MG/DL (ref 39–259)
EOSINOPHILS ABSOLUTE: 0.1 K/UL (ref 0–0.6)
EOSINOPHILS RELATIVE PERCENT: 1 %
EPITHELIAL CELLS, UA: 1 /HPF (ref 0–5)
GFR AFRICAN AMERICAN: 58
GFR NON-AFRICAN AMERICAN: 48
GLOBULIN: 2.4 G/DL
GLUCOSE BLD-MCNC: 106 MG/DL (ref 70–99)
GLUCOSE URINE: NEGATIVE MG/DL
HCT VFR BLD CALC: 36.8 % (ref 40.5–52.5)
HDLC SERPL-MCNC: 48 MG/DL (ref 40–60)
HEMOGLOBIN: 12.6 G/DL (ref 13.5–17.5)
HYALINE CASTS: 1 /LPF (ref 0–8)
KETONES, URINE: NEGATIVE MG/DL
LDL CHOLESTEROL CALCULATED: 78 MG/DL
LEUKOCYTE ESTERASE, URINE: ABNORMAL
LYMPHOCYTES ABSOLUTE: 0.9 K/UL (ref 1–5.1)
LYMPHOCYTES RELATIVE PERCENT: 14.6 %
MCH RBC QN AUTO: 30.6 PG (ref 26–34)
MCHC RBC AUTO-ENTMCNC: 34.2 G/DL (ref 31–36)
MCV RBC AUTO: 89.3 FL (ref 80–100)
MICROALBUMIN UR-MCNC: 3.4 MG/DL
MICROALBUMIN/CREAT UR-RTO: 25.3 MG/G (ref 0–30)
MICROSCOPIC EXAMINATION: YES
MONOCYTES ABSOLUTE: 0.6 K/UL (ref 0–1.3)
MONOCYTES RELATIVE PERCENT: 10 %
NEUTROPHILS ABSOLUTE: 4.8 K/UL (ref 1.7–7.7)
NEUTROPHILS RELATIVE PERCENT: 73.6 %
NITRITE, URINE: NEGATIVE
PDW BLD-RTO: 14.3 % (ref 12.4–15.4)
PH UA: 6
PLATELET # BLD: 245 K/UL (ref 135–450)
PMV BLD AUTO: 9.5 FL (ref 5–10.5)
POTASSIUM SERPL-SCNC: 4.5 MMOL/L (ref 3.5–5.1)
PROTEIN UA: NEGATIVE MG/DL
RBC # BLD: 4.12 M/UL (ref 4.2–5.9)
RBC UA: 4 /HPF (ref 0–4)
SODIUM BLD-SCNC: 145 MMOL/L (ref 136–145)
SPECIFIC GRAVITY UA: 1.02
TOTAL PROTEIN: 6.8 G/DL (ref 6.4–8.2)
TRIGL SERPL-MCNC: 90 MG/DL (ref 0–150)
TSH SERPL DL<=0.05 MIU/L-ACNC: 0.99 UIU/ML (ref 0.27–4.2)
UROBILINOGEN, URINE: 0.2 E.U./DL
VLDLC SERPL CALC-MCNC: 18 MG/DL
WBC # BLD: 6.5 K/UL (ref 4–11)
WBC UA: 323 /HPF (ref 0–5)

## 2018-06-25 PROCEDURE — 99214 OFFICE O/P EST MOD 30 MIN: CPT | Performed by: INTERNAL MEDICINE

## 2018-06-25 PROCEDURE — 4040F PNEUMOC VAC/ADMIN/RCVD: CPT | Performed by: INTERNAL MEDICINE

## 2018-06-25 PROCEDURE — 1036F TOBACCO NON-USER: CPT | Performed by: INTERNAL MEDICINE

## 2018-06-25 PROCEDURE — 1123F ACP DISCUSS/DSCN MKR DOCD: CPT | Performed by: INTERNAL MEDICINE

## 2018-06-25 PROCEDURE — G8427 DOCREV CUR MEDS BY ELIG CLIN: HCPCS | Performed by: INTERNAL MEDICINE

## 2018-06-25 PROCEDURE — G8417 CALC BMI ABV UP PARAM F/U: HCPCS | Performed by: INTERNAL MEDICINE

## 2018-06-25 ASSESSMENT — ENCOUNTER SYMPTOMS
GASTROINTESTINAL NEGATIVE: 1
CHEST TIGHTNESS: 0
WHEEZING: 0
RESPIRATORY NEGATIVE: 1
SHORTNESS OF BREATH: 0
BACK PAIN: 1

## 2018-09-10 RX ORDER — ESCITALOPRAM OXALATE 10 MG/1
TABLET ORAL
Qty: 90 TABLET | Refills: 0 | Status: SHIPPED | OUTPATIENT
Start: 2018-09-10 | End: 2018-12-11 | Stop reason: SDUPTHER

## 2018-09-10 RX ORDER — LEVOTHYROXINE SODIUM 88 MCG
TABLET ORAL
Qty: 90 TABLET | Refills: 0 | Status: SHIPPED | OUTPATIENT
Start: 2018-09-10 | End: 2018-12-17 | Stop reason: SDUPTHER

## 2018-09-21 ENCOUNTER — NURSE ONLY (OUTPATIENT)
Dept: INTERNAL MEDICINE | Age: 83
End: 2018-09-21

## 2018-09-21 DIAGNOSIS — R13.10 ABNORMAL SWALLOWING: Primary | ICD-10-CM

## 2018-09-21 DIAGNOSIS — Z23 NEED FOR INFLUENZA VACCINATION: Primary | ICD-10-CM

## 2018-09-21 PROCEDURE — 90662 IIV NO PRSV INCREASED AG IM: CPT | Performed by: INTERNAL MEDICINE

## 2018-09-21 PROCEDURE — G0008 ADMIN INFLUENZA VIRUS VAC: HCPCS | Performed by: INTERNAL MEDICINE

## 2018-12-12 RX ORDER — ESCITALOPRAM OXALATE 10 MG/1
TABLET ORAL
Qty: 90 TABLET | Refills: 0 | Status: SHIPPED | OUTPATIENT
Start: 2018-12-12 | End: 2019-02-07 | Stop reason: SDUPTHER

## 2018-12-17 RX ORDER — LEVOTHYROXINE SODIUM 88 MCG
TABLET ORAL
Qty: 90 TABLET | Refills: 0 | Status: SHIPPED | OUTPATIENT
Start: 2018-12-17 | End: 2019-03-09 | Stop reason: SDUPTHER

## 2019-01-03 ENCOUNTER — OFFICE VISIT (OUTPATIENT)
Dept: INTERNAL MEDICINE CLINIC | Age: 84
End: 2019-01-03
Payer: MEDICARE

## 2019-01-03 ENCOUNTER — TELEPHONE (OUTPATIENT)
Dept: INTERNAL MEDICINE CLINIC | Age: 84
End: 2019-01-03

## 2019-01-03 VITALS
WEIGHT: 201 LBS | OXYGEN SATURATION: 98 % | SYSTOLIC BLOOD PRESSURE: 124 MMHG | DIASTOLIC BLOOD PRESSURE: 72 MMHG | BODY MASS INDEX: 28.43 KG/M2 | HEART RATE: 71 BPM

## 2019-01-03 DIAGNOSIS — E03.9 ACQUIRED HYPOTHYROIDISM: ICD-10-CM

## 2019-01-03 DIAGNOSIS — I48.0 PAROXYSMAL ATRIAL FIBRILLATION (HCC): ICD-10-CM

## 2019-01-03 DIAGNOSIS — F32.5 MAJOR DEPRESSIVE DISORDER WITH SINGLE EPISODE, IN FULL REMISSION (HCC): ICD-10-CM

## 2019-01-03 DIAGNOSIS — I10 HTN (HYPERTENSION), BENIGN: ICD-10-CM

## 2019-01-03 DIAGNOSIS — E78.00 PURE HYPERCHOLESTEROLEMIA: ICD-10-CM

## 2019-01-03 PROCEDURE — 3288F FALL RISK ASSESSMENT DOCD: CPT | Performed by: INTERNAL MEDICINE

## 2019-01-03 PROCEDURE — 99214 OFFICE O/P EST MOD 30 MIN: CPT | Performed by: INTERNAL MEDICINE

## 2019-01-03 RX ORDER — CLOBETASOL PROPIONATE 0.5 MG/G
CREAM TOPICAL
Qty: 60 G | Refills: 3 | Status: SHIPPED | OUTPATIENT
Start: 2019-01-03

## 2019-01-03 ASSESSMENT — ENCOUNTER SYMPTOMS
SHORTNESS OF BREATH: 0
CHEST TIGHTNESS: 0
WHEEZING: 0
GASTROINTESTINAL NEGATIVE: 1
RESPIRATORY NEGATIVE: 1

## 2019-02-07 RX ORDER — ESCITALOPRAM OXALATE 10 MG/1
TABLET ORAL
Qty: 90 TABLET | Refills: 0 | Status: SHIPPED | OUTPATIENT
Start: 2019-02-07 | End: 2019-05-25 | Stop reason: SDUPTHER

## 2019-03-11 RX ORDER — LEVOTHYROXINE SODIUM 88 MCG
TABLET ORAL
Qty: 90 TABLET | Refills: 0 | Status: SHIPPED | OUTPATIENT
Start: 2019-03-11 | End: 2019-05-25 | Stop reason: SDUPTHER

## 2019-05-28 ENCOUNTER — TELEPHONE (OUTPATIENT)
Dept: INTERNAL MEDICINE CLINIC | Age: 84
End: 2019-05-28

## 2019-05-28 ENCOUNTER — OFFICE VISIT (OUTPATIENT)
Dept: INTERNAL MEDICINE CLINIC | Age: 84
End: 2019-05-28
Payer: MEDICARE

## 2019-05-28 VITALS
BODY MASS INDEX: 29.49 KG/M2 | OXYGEN SATURATION: 95 % | HEIGHT: 70 IN | HEART RATE: 63 BPM | DIASTOLIC BLOOD PRESSURE: 58 MMHG | WEIGHT: 206 LBS | SYSTOLIC BLOOD PRESSURE: 128 MMHG

## 2019-05-28 DIAGNOSIS — N62 GYNECOMASTIA: Primary | ICD-10-CM

## 2019-05-28 PROCEDURE — 1123F ACP DISCUSS/DSCN MKR DOCD: CPT | Performed by: NURSE PRACTITIONER

## 2019-05-28 PROCEDURE — 1036F TOBACCO NON-USER: CPT | Performed by: NURSE PRACTITIONER

## 2019-05-28 PROCEDURE — 4040F PNEUMOC VAC/ADMIN/RCVD: CPT | Performed by: NURSE PRACTITIONER

## 2019-05-28 PROCEDURE — G8427 DOCREV CUR MEDS BY ELIG CLIN: HCPCS | Performed by: NURSE PRACTITIONER

## 2019-05-28 PROCEDURE — G8417 CALC BMI ABV UP PARAM F/U: HCPCS | Performed by: NURSE PRACTITIONER

## 2019-05-28 PROCEDURE — 99214 OFFICE O/P EST MOD 30 MIN: CPT | Performed by: NURSE PRACTITIONER

## 2019-05-28 RX ORDER — ESCITALOPRAM OXALATE 10 MG/1
TABLET ORAL
Qty: 90 TABLET | Refills: 0 | Status: SHIPPED | OUTPATIENT
Start: 2019-05-28 | End: 2019-08-23 | Stop reason: SDUPTHER

## 2019-05-28 RX ORDER — LEVOTHYROXINE SODIUM 88 MCG
TABLET ORAL
Qty: 90 TABLET | Refills: 0 | Status: SHIPPED | OUTPATIENT
Start: 2019-05-28 | End: 2019-08-23 | Stop reason: SDUPTHER

## 2019-05-28 ASSESSMENT — ENCOUNTER SYMPTOMS
CONSTIPATION: 0
CHEST TIGHTNESS: 0
COUGH: 0
ABDOMINAL PAIN: 0
VOMITING: 0
SORE THROAT: 0
COLOR CHANGE: 0
DIARRHEA: 0
SINUS PRESSURE: 0
EYE ITCHING: 0
BLOOD IN STOOL: 0
WHEEZING: 0
EYE REDNESS: 0
NAUSEA: 0
RHINORRHEA: 0
SHORTNESS OF BREATH: 0
BACK PAIN: 0

## 2019-05-28 ASSESSMENT — PATIENT HEALTH QUESTIONNAIRE - PHQ9
1. LITTLE INTEREST OR PLEASURE IN DOING THINGS: 0
SUM OF ALL RESPONSES TO PHQ QUESTIONS 1-9: 0
SUM OF ALL RESPONSES TO PHQ9 QUESTIONS 1 & 2: 0
SUM OF ALL RESPONSES TO PHQ QUESTIONS 1-9: 0
2. FEELING DOWN, DEPRESSED OR HOPELESS: 0

## 2019-05-28 NOTE — PROGRESS NOTES
Subjective:      Patient ID: David Callejas is a 80 y.o. male. Chief Complaint   Patient presents with    Breast Mass     he has a sore on his breast        HPI  Conner Cespedes is in the office today with sore left breast tissue. He states that it started about 10 days ago. There is no drainage, it is sore to the touch and is larger than the right. Review of Systems   Constitutional: Negative for chills, fatigue and fever. HENT: Negative for congestion, ear pain, postnasal drip, rhinorrhea, sinus pressure, sneezing and sore throat. Eyes: Negative for redness and itching. Respiratory: Negative for cough, chest tightness, shortness of breath and wheezing. Cardiovascular: Negative for chest pain and palpitations. Gastrointestinal: Negative for abdominal pain, blood in stool, constipation, diarrhea, nausea and vomiting. Endocrine: Negative for cold intolerance and heat intolerance. Genitourinary: Negative for difficulty urinating, dysuria, flank pain, frequency, hematuria and urgency. Musculoskeletal: Negative for arthralgias, back pain, joint swelling and myalgias. Skin: Negative for color change, pallor, rash and wound. Allergic/Immunologic: Negative for environmental allergies and food allergies. Neurological: Negative for dizziness, seizures, syncope, weakness, light-headedness, numbness and headaches. Hematological: Negative for adenopathy. Does not bruise/bleed easily. Psychiatric/Behavioral: Negative for confusion, sleep disturbance and suicidal ideas. The patient is not nervous/anxious and is not hyperactive. Objective:   Physical Exam   Constitutional: He appears well-developed and well-nourished. HENT:   Right Ear: Hearing, tympanic membrane, external ear and ear canal normal.   Left Ear: Hearing, tympanic membrane, external ear and ear canal normal.   Nose: No mucosal edema or rhinorrhea. Right sinus exhibits no maxillary sinus tenderness and no frontal sinus tenderness. Left sinus exhibits no maxillary sinus tenderness and no frontal sinus tenderness. Mouth/Throat: No oropharyngeal exudate, posterior oropharyngeal edema, posterior oropharyngeal erythema or tonsillar abscesses. Cardiovascular: Normal rate, regular rhythm and normal heart sounds. Pulmonary/Chest: Effort normal and breath sounds normal.       Lymphadenopathy:        Head (right side): No submental, no submandibular, no tonsillar, no preauricular, no posterior auricular and no occipital adenopathy present. Head (left side): No submental, no submandibular, no tonsillar, no preauricular, no posterior auricular and no occipital adenopathy present. He has no cervical adenopathy. Skin: Skin is warm and dry. Assessment:      See Problem List assessment and plan       Plan:       Gynecomastia  Will check US              Patient engaged in shared decision making. Information given to evaluate options of treatment, understand what is needed and discuss importance of following plan.

## 2019-05-28 NOTE — TELEPHONE ENCOUNTER
Pt wants to be seen today for a sore and swollen breast  Dr Hanh Wynne doesn't have openings so \he asked to be scheduled with Kennedi Heredia

## 2019-06-05 ENCOUNTER — HOSPITAL ENCOUNTER (OUTPATIENT)
Dept: WOMENS IMAGING | Age: 84
Discharge: HOME | End: 2019-06-05
Payer: MEDICARE

## 2019-06-05 ENCOUNTER — HOSPITAL ENCOUNTER (OUTPATIENT)
Dept: ULTRASOUND IMAGING | Age: 84
Discharge: HOME | End: 2019-06-05
Payer: MEDICARE

## 2019-06-05 DIAGNOSIS — R93.89 ABNORMAL FINDINGS ON DIAGNOSTIC IMAGING OF OTHER SPECIFIED BODY STRUCTURES: ICD-10-CM

## 2019-06-05 DIAGNOSIS — N62 GYNECOMASTIA: Primary | ICD-10-CM

## 2019-06-05 DIAGNOSIS — N62 GYNECOMASTIA, MALE: ICD-10-CM

## 2019-06-05 DIAGNOSIS — N62 GYNECOMASTIA: ICD-10-CM

## 2019-06-05 PROCEDURE — G0279 TOMOSYNTHESIS, MAMMO: HCPCS

## 2019-06-06 DIAGNOSIS — N62 GYNECOMASTIA: ICD-10-CM

## 2019-06-06 DIAGNOSIS — R93.89 ABNORMAL FINDINGS ON DIAGNOSTIC IMAGING OF OTHER SPECIFIED BODY STRUCTURES: ICD-10-CM

## 2019-06-06 LAB
ESTRADIOL LEVEL: 36 PG/ML
LUTEINIZING HORMONE: 17.9 MIU/ML
TSH REFLEX: 1.26 UIU/ML (ref 0.27–4.2)

## 2019-06-08 LAB
SEX HORMONE BINDING GLOBULIN: 64 NMOL/L (ref 11–80)
TESTOSTERONE FREE-NONMALE: 46.3 PG/ML (ref 47–244)
TESTOSTERONE TOTAL: 364 NG/DL (ref 220–1000)

## 2019-07-11 ENCOUNTER — OFFICE VISIT (OUTPATIENT)
Dept: INTERNAL MEDICINE CLINIC | Age: 84
End: 2019-07-11
Payer: MEDICARE

## 2019-07-11 VITALS
OXYGEN SATURATION: 98 % | DIASTOLIC BLOOD PRESSURE: 64 MMHG | BODY MASS INDEX: 29.56 KG/M2 | HEART RATE: 78 BPM | WEIGHT: 206 LBS | SYSTOLIC BLOOD PRESSURE: 130 MMHG

## 2019-07-11 DIAGNOSIS — I10 HTN (HYPERTENSION), BENIGN: ICD-10-CM

## 2019-07-11 DIAGNOSIS — E03.9 ACQUIRED HYPOTHYROIDISM: ICD-10-CM

## 2019-07-11 DIAGNOSIS — E78.00 PURE HYPERCHOLESTEROLEMIA: ICD-10-CM

## 2019-07-11 DIAGNOSIS — M89.9 DISORDER OF BONE: ICD-10-CM

## 2019-07-11 DIAGNOSIS — Z12.12 SCREENING FOR COLORECTAL CANCER: ICD-10-CM

## 2019-07-11 DIAGNOSIS — I48.0 PAROXYSMAL ATRIAL FIBRILLATION (HCC): ICD-10-CM

## 2019-07-11 DIAGNOSIS — N62 GYNECOMASTIA: ICD-10-CM

## 2019-07-11 DIAGNOSIS — Z12.11 SCREENING FOR COLORECTAL CANCER: ICD-10-CM

## 2019-07-11 DIAGNOSIS — Z00.00 ROUTINE GENERAL MEDICAL EXAMINATION AT A HEALTH CARE FACILITY: Primary | ICD-10-CM

## 2019-07-11 DIAGNOSIS — Z12.5 ENCOUNTER FOR SCREENING FOR MALIGNANT NEOPLASM OF PROSTATE: ICD-10-CM

## 2019-07-11 DIAGNOSIS — Z00.00 ROUTINE GENERAL MEDICAL EXAMINATION AT A HEALTH CARE FACILITY: ICD-10-CM

## 2019-07-11 DIAGNOSIS — K59.01 SLOW TRANSIT CONSTIPATION: ICD-10-CM

## 2019-07-11 LAB
A/G RATIO: 1.9 (ref 1.1–2.2)
ALBUMIN SERPL-MCNC: 4.2 G/DL (ref 3.4–5)
ALP BLD-CCNC: 51 U/L (ref 40–129)
ALT SERPL-CCNC: 9 U/L (ref 10–40)
ANION GAP SERPL CALCULATED.3IONS-SCNC: 13 MMOL/L (ref 3–16)
AST SERPL-CCNC: 13 U/L (ref 15–37)
BACTERIA: ABNORMAL /HPF
BASOPHILS ABSOLUTE: 0 K/UL (ref 0–0.2)
BASOPHILS RELATIVE PERCENT: 0.7 %
BILIRUB SERPL-MCNC: 0.5 MG/DL (ref 0–1)
BILIRUBIN URINE: NEGATIVE
BLOOD, URINE: NEGATIVE
BUN BLDV-MCNC: 30 MG/DL (ref 7–20)
CALCIUM SERPL-MCNC: 9.1 MG/DL (ref 8.3–10.6)
CHLORIDE BLD-SCNC: 105 MMOL/L (ref 99–110)
CHOLESTEROL, TOTAL: 144 MG/DL (ref 0–199)
CLARITY: ABNORMAL
CO2: 24 MMOL/L (ref 21–32)
COLOR: YELLOW
CONTROL: NORMAL
CREAT SERPL-MCNC: 1.5 MG/DL (ref 0.8–1.3)
DIGOXIN LEVEL: 1.4 NG/ML (ref 0.8–2)
EOSINOPHILS ABSOLUTE: 0.1 K/UL (ref 0–0.6)
EOSINOPHILS RELATIVE PERCENT: 1.4 %
EPITHELIAL CELLS, UA: 3 /HPF (ref 0–5)
GFR AFRICAN AMERICAN: 54
GFR NON-AFRICAN AMERICAN: 45
GLOBULIN: 2.2 G/DL
GLUCOSE BLD-MCNC: 108 MG/DL (ref 70–99)
GLUCOSE URINE: NEGATIVE MG/DL
HCT VFR BLD CALC: 36.9 % (ref 40.5–52.5)
HDLC SERPL-MCNC: 40 MG/DL (ref 40–60)
HEMOCCULT STL QL: NORMAL
HEMOGLOBIN: 12 G/DL (ref 13.5–17.5)
HYALINE CASTS: 4 /LPF (ref 0–8)
KETONES, URINE: NEGATIVE MG/DL
LDL CHOLESTEROL CALCULATED: 82 MG/DL
LEUKOCYTE ESTERASE, URINE: ABNORMAL
LYMPHOCYTES ABSOLUTE: 0.8 K/UL (ref 1–5.1)
LYMPHOCYTES RELATIVE PERCENT: 14.4 %
MCH RBC QN AUTO: 30 PG (ref 26–34)
MCHC RBC AUTO-ENTMCNC: 32.6 G/DL (ref 31–36)
MCV RBC AUTO: 92 FL (ref 80–100)
MICROSCOPIC EXAMINATION: YES
MONOCYTES ABSOLUTE: 0.7 K/UL (ref 0–1.3)
MONOCYTES RELATIVE PERCENT: 11.3 %
NEUTROPHILS ABSOLUTE: 4.2 K/UL (ref 1.7–7.7)
NEUTROPHILS RELATIVE PERCENT: 72.2 %
NITRITE, URINE: NEGATIVE
PDW BLD-RTO: 14.7 % (ref 12.4–15.4)
PH UA: 6 (ref 5–8)
PLATELET # BLD: 261 K/UL (ref 135–450)
PMV BLD AUTO: 9.1 FL (ref 5–10.5)
POTASSIUM SERPL-SCNC: 4.6 MMOL/L (ref 3.5–5.1)
PROSTATE SPECIFIC ANTIGEN: 3.49 NG/ML (ref 0–4)
PROTEIN UA: NEGATIVE MG/DL
RBC # BLD: 4.01 M/UL (ref 4.2–5.9)
RBC UA: 3 /HPF (ref 0–4)
SODIUM BLD-SCNC: 142 MMOL/L (ref 136–145)
SPECIFIC GRAVITY UA: 1.02 (ref 1–1.03)
TOTAL PROTEIN: 6.4 G/DL (ref 6.4–8.2)
TRIGL SERPL-MCNC: 108 MG/DL (ref 0–150)
TSH SERPL DL<=0.05 MIU/L-ACNC: 1.39 UIU/ML (ref 0.27–4.2)
UROBILINOGEN, URINE: 0.2 E.U./DL
VITAMIN D 25-HYDROXY: 34.4 NG/ML
VLDLC SERPL CALC-MCNC: 22 MG/DL
WBC # BLD: 5.9 K/UL (ref 4–11)
WBC UA: 138 /HPF (ref 0–5)

## 2019-07-11 PROCEDURE — 1123F ACP DISCUSS/DSCN MKR DOCD: CPT | Performed by: INTERNAL MEDICINE

## 2019-07-11 PROCEDURE — 82274 ASSAY TEST FOR BLOOD FECAL: CPT | Performed by: INTERNAL MEDICINE

## 2019-07-11 PROCEDURE — G0439 PPPS, SUBSEQ VISIT: HCPCS | Performed by: INTERNAL MEDICINE

## 2019-07-11 PROCEDURE — 93000 ELECTROCARDIOGRAM COMPLETE: CPT | Performed by: INTERNAL MEDICINE

## 2019-07-11 PROCEDURE — 4040F PNEUMOC VAC/ADMIN/RCVD: CPT | Performed by: INTERNAL MEDICINE

## 2019-07-11 RX ORDER — DOCUSATE SODIUM 100 MG/1
100 CAPSULE, LIQUID FILLED ORAL 2 TIMES DAILY
Qty: 60 CAPSULE | Refills: 5 | Status: SHIPPED | OUTPATIENT
Start: 2019-07-11 | End: 2020-01-06

## 2019-07-11 ASSESSMENT — ENCOUNTER SYMPTOMS
SHORTNESS OF BREATH: 0
EYE REDNESS: 0
DIARRHEA: 0
CHOKING: 0
PHOTOPHOBIA: 0
VOMITING: 0
BACK PAIN: 0
STRIDOR: 0
VOICE CHANGE: 0
ABDOMINAL PAIN: 0
SORE THROAT: 0
COLOR CHANGE: 0
CONSTIPATION: 1
EYE PAIN: 0
COUGH: 0
RECTAL PAIN: 0
RHINORRHEA: 0
EYE DISCHARGE: 0
FACIAL SWELLING: 0
BLOOD IN STOOL: 0
WHEEZING: 0
EYE ITCHING: 0
ANAL BLEEDING: 0
APNEA: 0
CHEST TIGHTNESS: 0
SINUS PRESSURE: 0
TROUBLE SWALLOWING: 0
ABDOMINAL DISTENTION: 1
SINUS PAIN: 0
NAUSEA: 0

## 2019-07-11 ASSESSMENT — PATIENT HEALTH QUESTIONNAIRE - PHQ9
2. FEELING DOWN, DEPRESSED OR HOPELESS: 0
SUM OF ALL RESPONSES TO PHQ QUESTIONS 1-9: 0
1. LITTLE INTEREST OR PLEASURE IN DOING THINGS: 0
SUM OF ALL RESPONSES TO PHQ QUESTIONS 1-9: 0
SUM OF ALL RESPONSES TO PHQ9 QUESTIONS 1 & 2: 0

## 2019-07-11 ASSESSMENT — ANXIETY QUESTIONNAIRES: GAD7 TOTAL SCORE: 0

## 2019-07-11 ASSESSMENT — LIFESTYLE VARIABLES: HOW OFTEN DO YOU HAVE A DRINK CONTAINING ALCOHOL: 0

## 2019-07-11 NOTE — PROGRESS NOTES
Diagnosis Date    Achalasia     Cholelithiasis     Colon polyps     Elevated prostate specific antigen (PSA)     GERD (gastroesophageal reflux disease)     Gout     HTN (hypertension), benign     Hyperlipidemia     Hypothyroidism     Macular degeneration     Malignant neoplasm of parotid gland (HCC)     parotid glad cancer    Paroxysmal atrial fibrillation ([address]     Renal cyst, left     Sleep apnea 2012     Past Surgical History:   Procedure Laterality Date    CHOLECYSTECTOMY  2001    COLONOSCOPY  1991    Polyp    COLONOSCOPY  9/8/2005    Adenomatous polyp    COLONOSCOPY  11/6/2008    Adenomatous polyp    COLONOSCOPY  1/30/2012    Unremarkable    CYST REMOVAL  2005    Chest wall epidermal inclusion cyst    HYDROCELE EXCISION  1983    IRIDOTOMY / IRIDECTOMY  2006    Laser    PAROTIDECTOMY  12/4/2007    Left parotid for cancer    PROSTATE SURGERY  2/3/2005    TUNA    UPPER GASTROINTESTINAL ENDOSCOPY  1997    Gastritis    UPPER GASTROINTESTINAL ENDOSCOPY  5/2012    Esophageal dilatation    UPPER GASTROINTESTINAL ENDOSCOPY  12/10/2012    Esophageal dilatation    UPPER GASTROINTESTINAL ENDOSCOPY  4/19/2013    Esophageal dilatation     Family History   Problem Relation Age of Onset    Arthritis Mother     Dementia Mother     Stroke Father     Hypertension Father     Breast Cancer Sister     Thyroid Cancer Sister     Hypertension Brother     Diabetes Brother     Coronary Art Dis Brother        CareTeam (Including outside providers/suppliers regularly involved in providing care):   Patient Care Team:  Ni Guzmán MD as PCP - [facility] MD Melvina as PCP - [facility] Provider    Wt Readings from Last 3 Encounters:   07/11/19 206 lb (93.4 kg)   05/28/19 206 lb (93.4 kg)   01/03/19 201 lb (91.2 kg)     Vitals:    07/11/19 0855   BP: 130/64   Site: Left Upper Arm   Position: Sitting   Cuff Size: Medium Adult   Pulse: 78   SpO2: 98%   Weight: 206 lb (93.4 kg)     Body

## 2019-07-11 NOTE — PATIENT INSTRUCTIONS
Personalized Preventive Plan for Declan Stevens - 7/11/2019  Medicare offers a range of preventive health benefits. Some of the tests and screenings are paid in full while other may be subject to a deductible, co-insurance, and/or copay. Some of these benefits include a comprehensive review of your medical history including lifestyle, illnesses that may run in your family, and various assessments and screenings as appropriate. After reviewing your medical record and screening and assessments performed today your provider may have ordered immunizations, labs, imaging, and/or referrals for you. A list of these orders (if applicable) as well as your Preventive Care list are included within your After Visit Summary for your review. Other Preventive Recommendations:    · A preventive eye exam performed by an eye specialist is recommended every 1-2 years to screen for glaucoma; cataracts, macular degeneration, and other eye disorders. · A preventive dental visit is recommended every 6 months. · Try to get at least 150 minutes of exercise per week or 10,000 steps per day on a pedometer . · Order or download the FREE \"Exercise & Physical Activity: Your Everyday Guide\" from [employer]. Call 7-418.664.1541 or search [employer] online. · You need 9793-3538 mg of calcium and 3290-7899 IU of vitamin D per day. It is possible to meet your calcium requirement with diet alone, but a vitamin D supplement is usually necessary to meet this goal.  · When exposed to the sun, use a sunscreen that protects against both UVA and UVB radiation with an SPF of 30 or greater. Reapply every 2 to 3 hours or after sweating, drying off with a towel, or swimming. · Always wear a seat belt when traveling in a car. Always wear a helmet when riding a bicycle or motorcycle. Heart-Healthy Diet   Sodium, Fat, and Cholesterol Controlled Diet       What Is a Heart Healthy Diet?    A heart-healthy diet is one that limits sodium , certain types of fat , and cholesterol . This type of diet is recommended for:   People with any form of cardiovascular disease (eg, coronary heart disease , peripheral vascular disease , previous heart attack , previous stroke )   People with risk factors for cardiovascular disease, such as high blood pressure , high cholesterol , or diabetes   Anyone who wants to lower their risk of developing cardiovascular disease   Sodium    Sodium is a mineral found in many foods. In general, most people consume much more sodium than they need. Diets high in sodium can increase blood pressure and lead to edema (water retention). On a heart-healthy diet, you should consume no more than 2,300 mg (milligrams) of sodium per dayabout the amount in one teaspoon of table salt. The foods highest in sodium include table salt (about 50% sodium), processed foods, convenience foods, and preserved foods. Cholesterol    Cholesterol is a fat-like, waxy substance in your blood. Our bodies make some cholesterol. It is also found in animal products, with the highest amounts in fatty meat, egg yolks, whole milk, cheese, shellfish, and organ meats. On a heart-healthy diet, you should limit your cholesterol intake to less than 200 mg per day. It is normal and important to have some cholesterol in your bloodstream. But too much cholesterol can cause plaque to build up within your arteries, which can eventually lead to a heart attack or stroke. The two types of cholesterol that are most commonly referred to are:   Low-density lipoprotein (LDL) cholesterol  Also known as bad cholesterol, this is the cholesterol that tends to build up along your arteries. Bad cholesterol levels are increased by eating fats that are saturated or hydrogenated. Optimal level of this cholesterol is less than 100. Over 130 starts to get risky for heart disease.    High-density lipoprotein (HDL) cholesterol  Also known as good cholesterol, this type of cholesterol actually carries cholesterol away from your arteries and may, therefore, help lower your risk of having a heart attack. You want this level to be high (ideally greater than 60). It is a risk to have a level less than 40. You can raise this good cholesterol by eating olive oil, canola oil, avocados, or nuts. Exercise raises this level, too. Fat    Fat is calorie dense and packs a lot of calories into a small amount of food. Even though fats should be limited due to their high calorie content, not all fats are bad. In fact, some fats are quite healthful. Fat can be broken down into four main types. The good-for-you fats are:   Monounsaturated fat  found in oils such as olive and canola, avocados, and nuts and natural nut butters; can decrease cholesterol levels, while keeping levels of HDL cholesterol high   Polyunsaturated fat  found in oils such as safflower, sunflower, soybean, corn, and sesame; can decrease total cholesterol and LDL cholesterol   Omega-3 fatty acids  particularly those found in fatty fish (such as salmon, trout, tuna, mackerel, herring, and sardines); can decrease risk of arrhythmias, decrease triglyceride levels, and slightly lower blood pressure   The fats that you want to limit are:   Saturated fat  found in animal products, many fast foods, and a few vegetables; increases total blood cholesterol, including LDL levels   Animal fats that are saturated include: butter, lard, whole-milk dairy products, meat fat, and poultry skin   Vegetable fats that are saturated include: hydrogenated shortening, palm oil, coconut oil, cocoa butter   Hydrogenated or trans fat  found in margarine and vegetable shortening, most shelf stable snack foods, and fried foods; increases LDL and decreases HDL     It is generally recommended that you limit your total fat for the day to less than 30% of your total calories.  If you follow an 1800-calorie heart healthy diet, for example, this would mean 60 grams of fat or less per day. Saturated fat and trans fat in your diet raises your blood cholesterol the most, much more than dietary cholesterol does. For this reason, on a heart-healthy diet, less than 7% of your calories should come from saturated fat and ideally 0% from trans fat. On an 1800-calorie diet, this translates into less than 14 grams of saturated fat per day, leaving 46 grams of fat to come from mono- and polyunsaturated fats.    Food Choices on a Heart Healthy Diet   Food Category   Foods Recommended   Foods to Avoid   Grains   Breads and rolls without salted tops Most dry and cooked cereals Unsalted crackers and breadsticks Low-sodium or homemade breadcrumbs or stuffing All rice and pastas   Breads, rolls, and crackers with salted tops High-fat baked goods (eg, muffins, donuts, pastries) Quick breads, self-rising flour, and biscuit mixes Regular bread crumbs Instant hot cereals Commercially prepared rice, pasta, or stuffing mixes   Vegetables   Most fresh, frozen, and low-sodium canned vegetables Low-sodium and salt-free vegetable juices Canned vegetables if unsalted or rinsed   Regular canned vegetables and juices, including sauerkraut and pickled vegetables Frozen vegetables with sauces Commercially prepared potato and vegetable mixes   Fruits   Most fresh, frozen, and canned fruits All fruit juices   Fruits processed with salt or sodium   Milk   Nonfat or low-fat (1%) milk Nonfat or low-fat yogurt Cottage cheese, low-fat ricotta, cheeses labeled as low-fat and low-sodium   Whole milk Reduced-fat (2%) milk Malted and chocolate milk Full fat yogurt Most cheeses (unless low-fat and low salt) Buttermilk (no more than 1 cup per week)   Meats and Beans   Lean cuts of fresh or frozen beef, veal, lamb, or pork (look for the word loin) Fresh or frozen poultry without the skin Fresh or frozen fish and some shellfish Egg whites and egg substitutes (Limit whole eggs to three per week) Tofu Nuts or seeds (unsalted, dry-roasted), low-sodium peanut butter Dried peas, beans, and lentils   Any smoked, cured, salted, or canned meat, fish, or poultry (including walter, chipped beef, cold cuts, hot dogs, sausages, sardines, and anchovies) Poultry skins Breaded and/or fried fish or meats Canned peas, beans, and lentils Salted nuts   Fats and Oils   Olive oil and canola oil Low-sodium, low-fat salad dressings and mayonnaise   Butter, margarine, coconut and palm oils, walter fat   Snacks, Sweets, and Condiments   Low-sodium or unsalted versions of broths, soups, soy sauce, and condiments Pepper, herbs, and spices; vinegar, lemon, or lime juice Low-fat frozen desserts (yogurt, sherbet, fruit bars) Sugar, cocoa powder, honey, syrup, jam, and preserves Low-fat, trans-fat free cookies, cakes, and pies John and animal crackers, fig bars, diana snaps   High-fat desserts Broth, soups, gravies, and sauces, made from instant mixes or other high-sodium ingredients Salted snack foods Canned olives Meat tenderizers, seasoning salt, and most flavored vinegars   Beverages   Low-sodium carbonated beverages Tea and coffee in moderation Soy milk   Commercially softened water   Suggestions   Make whole grains, fruits, and vegetables the base of your diet. Choose heart-healthy fats such as canola, olive, and flaxseed oil, and foods high in heart-healthy fats, such as nuts, seeds, soybeans, tofu, and fish. Eat fish at least twice per week; the fish highest in omega-3 fatty acids and lowest in mercury include salmon, herring, mackerel, sardines, and canned chunk light tuna. If you eat fish less than twice per week or have high triglycerides, talk to your doctor about taking fish oil supplements. Read food labels.    For products low in fat and cholesterol, look for fat free, low-fat, cholesterol free, saturated fat free, and trans fat freeAlso scan the Nutrition Facts Label, which lists saturated fat, trans fat,

## 2019-07-29 ENCOUNTER — TELEPHONE (OUTPATIENT)
Dept: INTERNAL MEDICINE CLINIC | Age: 84
End: 2019-07-29

## 2019-08-10 PROBLEM — Z00.00 ROUTINE GENERAL MEDICAL EXAMINATION AT A HEALTH CARE FACILITY: Status: RESOLVED | Noted: 2019-07-11 | Resolved: 2019-08-10

## 2019-08-23 ENCOUNTER — TELEPHONE (OUTPATIENT)
Dept: INTERNAL MEDICINE CLINIC | Age: 84
End: 2019-08-23

## 2019-08-23 RX ORDER — LEVOTHYROXINE SODIUM 88 MCG
TABLET ORAL
Qty: 90 TABLET | Refills: 0 | Status: SHIPPED | OUTPATIENT
Start: 2019-08-23 | End: 2019-11-21 | Stop reason: SDUPTHER

## 2019-08-23 RX ORDER — ESCITALOPRAM OXALATE 10 MG/1
TABLET ORAL
Qty: 90 TABLET | Refills: 0 | Status: SHIPPED | OUTPATIENT
Start: 2019-08-23 | End: 2019-11-21 | Stop reason: SDUPTHER

## 2019-08-27 ENCOUNTER — OFFICE VISIT (OUTPATIENT)
Dept: INTERNAL MEDICINE CLINIC | Age: 84
End: 2019-08-27
Payer: MEDICARE

## 2019-08-27 VITALS
DIASTOLIC BLOOD PRESSURE: 62 MMHG | OXYGEN SATURATION: 93 % | BODY MASS INDEX: 29.63 KG/M2 | SYSTOLIC BLOOD PRESSURE: 140 MMHG | HEIGHT: 70 IN | WEIGHT: 207 LBS | HEART RATE: 71 BPM

## 2019-08-27 DIAGNOSIS — S06.0X0S CONCUSSION WITHOUT LOSS OF CONSCIOUSNESS, SEQUELA (HCC): ICD-10-CM

## 2019-08-27 PROBLEM — S06.0X0A CONCUSSION WITHOUT LOSS OF CONSCIOUSNESS: Status: ACTIVE | Noted: 2019-08-27

## 2019-08-27 PROCEDURE — 1123F ACP DISCUSS/DSCN MKR DOCD: CPT | Performed by: NURSE PRACTITIONER

## 2019-08-27 PROCEDURE — 99213 OFFICE O/P EST LOW 20 MIN: CPT | Performed by: NURSE PRACTITIONER

## 2019-08-27 PROCEDURE — 1036F TOBACCO NON-USER: CPT | Performed by: NURSE PRACTITIONER

## 2019-08-27 PROCEDURE — G8427 DOCREV CUR MEDS BY ELIG CLIN: HCPCS | Performed by: NURSE PRACTITIONER

## 2019-08-27 PROCEDURE — G8417 CALC BMI ABV UP PARAM F/U: HCPCS | Performed by: NURSE PRACTITIONER

## 2019-08-27 PROCEDURE — 4040F PNEUMOC VAC/ADMIN/RCVD: CPT | Performed by: NURSE PRACTITIONER

## 2019-08-27 ASSESSMENT — ENCOUNTER SYMPTOMS
SORE THROAT: 0
SHORTNESS OF BREATH: 0
COLOR CHANGE: 0
BACK PAIN: 0
ABDOMINAL PAIN: 0
WHEEZING: 0
SINUS PRESSURE: 0
SINUS PAIN: 0
DIARRHEA: 0
CONSTIPATION: 0
COUGH: 0

## 2019-09-16 ENCOUNTER — OFFICE VISIT (OUTPATIENT)
Dept: INTERNAL MEDICINE CLINIC | Age: 84
End: 2019-09-16
Payer: MEDICARE

## 2019-09-16 VITALS
WEIGHT: 205 LBS | OXYGEN SATURATION: 97 % | HEIGHT: 70 IN | SYSTOLIC BLOOD PRESSURE: 130 MMHG | DIASTOLIC BLOOD PRESSURE: 64 MMHG | BODY MASS INDEX: 29.35 KG/M2 | HEART RATE: 68 BPM

## 2019-09-16 DIAGNOSIS — G44.321 INTRACTABLE CHRONIC POST-TRAUMATIC HEADACHE: Primary | ICD-10-CM

## 2019-09-16 PROBLEM — G44.029 CHRONIC CLUSTER HEADACHE: Status: ACTIVE | Noted: 2019-09-16

## 2019-09-16 PROCEDURE — 3288F FALL RISK ASSESSMENT DOCD: CPT | Performed by: NURSE PRACTITIONER

## 2019-09-16 PROCEDURE — G8510 SCR DEP NEG, NO PLAN REQD: HCPCS | Performed by: NURSE PRACTITIONER

## 2019-09-16 PROCEDURE — G8427 DOCREV CUR MEDS BY ELIG CLIN: HCPCS | Performed by: NURSE PRACTITIONER

## 2019-09-16 PROCEDURE — 1123F ACP DISCUSS/DSCN MKR DOCD: CPT | Performed by: NURSE PRACTITIONER

## 2019-09-16 PROCEDURE — 1036F TOBACCO NON-USER: CPT | Performed by: NURSE PRACTITIONER

## 2019-09-16 PROCEDURE — 4040F PNEUMOC VAC/ADMIN/RCVD: CPT | Performed by: NURSE PRACTITIONER

## 2019-09-16 PROCEDURE — 99214 OFFICE O/P EST MOD 30 MIN: CPT | Performed by: NURSE PRACTITIONER

## 2019-09-16 PROCEDURE — G8417 CALC BMI ABV UP PARAM F/U: HCPCS | Performed by: NURSE PRACTITIONER

## 2019-09-16 ASSESSMENT — ENCOUNTER SYMPTOMS
COUGH: 0
BACK PAIN: 0
COLOR CHANGE: 0
SHORTNESS OF BREATH: 0
ABDOMINAL PAIN: 0
SINUS PRESSURE: 0
DIARRHEA: 0
WHEEZING: 0
SINUS PAIN: 0
CONSTIPATION: 0

## 2019-09-16 ASSESSMENT — PATIENT HEALTH QUESTIONNAIRE - PHQ9
SUM OF ALL RESPONSES TO PHQ QUESTIONS 1-9: 0
2. FEELING DOWN, DEPRESSED OR HOPELESS: 0
1. LITTLE INTEREST OR PLEASURE IN DOING THINGS: 0
SUM OF ALL RESPONSES TO PHQ9 QUESTIONS 1 & 2: 0
SUM OF ALL RESPONSES TO PHQ QUESTIONS 1-9: 0

## 2019-09-26 ENCOUNTER — TELEPHONE (OUTPATIENT)
Dept: INTERNAL MEDICINE CLINIC | Age: 84
End: 2019-09-26

## 2019-09-26 NOTE — TELEPHONE ENCOUNTER
MRI was done with [id number].    Abstracted results from care everywhere   Please advised

## 2019-10-07 ENCOUNTER — TELEPHONE (OUTPATIENT)
Dept: INTERNAL MEDICINE CLINIC | Age: 84
End: 2019-10-07

## 2019-11-11 ENCOUNTER — OFFICE VISIT (OUTPATIENT)
Dept: INTERNAL MEDICINE CLINIC | Age: 84
End: 2019-11-11
Payer: MEDICARE

## 2019-11-11 VITALS
OXYGEN SATURATION: 97 % | HEIGHT: 70 IN | SYSTOLIC BLOOD PRESSURE: 132 MMHG | HEART RATE: 68 BPM | WEIGHT: 210 LBS | BODY MASS INDEX: 30.06 KG/M2 | DIASTOLIC BLOOD PRESSURE: 74 MMHG

## 2019-11-11 DIAGNOSIS — I10 HTN (HYPERTENSION), BENIGN: Primary | ICD-10-CM

## 2019-11-11 DIAGNOSIS — M1A.09X0 IDIOPATHIC CHRONIC GOUT OF MULTIPLE SITES WITHOUT TOPHUS: ICD-10-CM

## 2019-11-11 DIAGNOSIS — I48.0 PAROXYSMAL ATRIAL FIBRILLATION (HCC): ICD-10-CM

## 2019-11-11 DIAGNOSIS — E78.00 PURE HYPERCHOLESTEROLEMIA: ICD-10-CM

## 2019-11-11 DIAGNOSIS — E03.9 ACQUIRED HYPOTHYROIDISM: ICD-10-CM

## 2019-11-11 DIAGNOSIS — R13.12 OROPHARYNGEAL DYSPHAGIA: ICD-10-CM

## 2019-11-11 DIAGNOSIS — K59.01 SLOW TRANSIT CONSTIPATION: ICD-10-CM

## 2019-11-11 DIAGNOSIS — Z23 NEED FOR INFLUENZA VACCINATION: ICD-10-CM

## 2019-11-11 DIAGNOSIS — I10 HTN (HYPERTENSION), BENIGN: ICD-10-CM

## 2019-11-11 PROBLEM — S06.0X0A CONCUSSION WITHOUT LOSS OF CONSCIOUSNESS: Status: RESOLVED | Noted: 2019-08-27 | Resolved: 2019-11-11

## 2019-11-11 LAB
A/G RATIO: 2 (ref 1.1–2.2)
ALBUMIN SERPL-MCNC: 4.3 G/DL (ref 3.4–5)
ALP BLD-CCNC: 62 U/L (ref 40–129)
ALT SERPL-CCNC: 9 U/L (ref 10–40)
ANION GAP SERPL CALCULATED.3IONS-SCNC: 17 MMOL/L (ref 3–16)
AST SERPL-CCNC: 15 U/L (ref 15–37)
BACTERIA: ABNORMAL /HPF
BASOPHILS ABSOLUTE: 0.1 K/UL (ref 0–0.2)
BASOPHILS RELATIVE PERCENT: 0.8 %
BILIRUB SERPL-MCNC: 0.7 MG/DL (ref 0–1)
BILIRUBIN URINE: NEGATIVE
BLOOD, URINE: NEGATIVE
BUN BLDV-MCNC: 29 MG/DL (ref 7–20)
CALCIUM SERPL-MCNC: 8.9 MG/DL (ref 8.3–10.6)
CHLORIDE BLD-SCNC: 103 MMOL/L (ref 99–110)
CHOLESTEROL, TOTAL: 144 MG/DL (ref 0–199)
CLARITY: ABNORMAL
CO2: 22 MMOL/L (ref 21–32)
COLOR: YELLOW
CREAT SERPL-MCNC: 1.7 MG/DL (ref 0.8–1.3)
EOSINOPHILS ABSOLUTE: 0.1 K/UL (ref 0–0.6)
EOSINOPHILS RELATIVE PERCENT: 1.1 %
EPITHELIAL CELLS, UA: 3 /HPF (ref 0–5)
GFR AFRICAN AMERICAN: 47
GFR NON-AFRICAN AMERICAN: 38
GLOBULIN: 2.2 G/DL
GLUCOSE BLD-MCNC: 100 MG/DL (ref 70–99)
GLUCOSE URINE: NEGATIVE MG/DL
HCT VFR BLD CALC: 35.8 % (ref 40.5–52.5)
HDLC SERPL-MCNC: 56 MG/DL (ref 40–60)
HEMOGLOBIN: 11.9 G/DL (ref 13.5–17.5)
HYALINE CASTS: 3 /LPF (ref 0–8)
KETONES, URINE: NEGATIVE MG/DL
LDL CHOLESTEROL CALCULATED: 73 MG/DL
LEUKOCYTE ESTERASE, URINE: ABNORMAL
LYMPHOCYTES ABSOLUTE: 1.4 K/UL (ref 1–5.1)
LYMPHOCYTES RELATIVE PERCENT: 16.9 %
MCH RBC QN AUTO: 30.2 PG (ref 26–34)
MCHC RBC AUTO-ENTMCNC: 33.3 G/DL (ref 31–36)
MCV RBC AUTO: 90.7 FL (ref 80–100)
MICROSCOPIC EXAMINATION: YES
MONOCYTES ABSOLUTE: 0.7 K/UL (ref 0–1.3)
MONOCYTES RELATIVE PERCENT: 9.2 %
NEUTROPHILS ABSOLUTE: 5.8 K/UL (ref 1.7–7.7)
NEUTROPHILS RELATIVE PERCENT: 72 %
NITRITE, URINE: NEGATIVE
PDW BLD-RTO: 14.3 % (ref 12.4–15.4)
PH UA: 5.5 (ref 5–8)
PLATELET # BLD: 254 K/UL (ref 135–450)
PMV BLD AUTO: 9.4 FL (ref 5–10.5)
POTASSIUM SERPL-SCNC: 4.4 MMOL/L (ref 3.5–5.1)
PROTEIN UA: NEGATIVE MG/DL
RBC # BLD: 3.95 M/UL (ref 4.2–5.9)
RBC UA: 1 /HPF (ref 0–4)
SODIUM BLD-SCNC: 142 MMOL/L (ref 136–145)
SPECIFIC GRAVITY UA: 1.02 (ref 1–1.03)
TOTAL PROTEIN: 6.5 G/DL (ref 6.4–8.2)
TRIGL SERPL-MCNC: 77 MG/DL (ref 0–150)
URIC ACID, SERUM: 7.8 MG/DL (ref 3.5–7.2)
URINE TYPE: ABNORMAL
UROBILINOGEN, URINE: 0.2 E.U./DL
VITAMIN D 25-HYDROXY: 29.8 NG/ML
VLDLC SERPL CALC-MCNC: 15 MG/DL
WBC # BLD: 8.1 K/UL (ref 4–11)
WBC UA: 43 /HPF (ref 0–5)

## 2019-11-11 PROCEDURE — G8484 FLU IMMUNIZE NO ADMIN: HCPCS | Performed by: INTERNAL MEDICINE

## 2019-11-11 PROCEDURE — 90653 IIV ADJUVANT VACCINE IM: CPT | Performed by: INTERNAL MEDICINE

## 2019-11-11 PROCEDURE — 3288F FALL RISK ASSESSMENT DOCD: CPT | Performed by: INTERNAL MEDICINE

## 2019-11-11 PROCEDURE — 1123F ACP DISCUSS/DSCN MKR DOCD: CPT | Performed by: INTERNAL MEDICINE

## 2019-11-11 PROCEDURE — G8417 CALC BMI ABV UP PARAM F/U: HCPCS | Performed by: INTERNAL MEDICINE

## 2019-11-11 PROCEDURE — G0008 ADMIN INFLUENZA VIRUS VAC: HCPCS | Performed by: INTERNAL MEDICINE

## 2019-11-11 PROCEDURE — 4040F PNEUMOC VAC/ADMIN/RCVD: CPT | Performed by: INTERNAL MEDICINE

## 2019-11-11 PROCEDURE — G8510 SCR DEP NEG, NO PLAN REQD: HCPCS | Performed by: INTERNAL MEDICINE

## 2019-11-11 PROCEDURE — G8427 DOCREV CUR MEDS BY ELIG CLIN: HCPCS | Performed by: INTERNAL MEDICINE

## 2019-11-11 PROCEDURE — 99214 OFFICE O/P EST MOD 30 MIN: CPT | Performed by: INTERNAL MEDICINE

## 2019-11-11 PROCEDURE — 1036F TOBACCO NON-USER: CPT | Performed by: INTERNAL MEDICINE

## 2019-11-11 RX ORDER — PANTOPRAZOLE SODIUM 40 MG/1
40 TABLET, DELAYED RELEASE ORAL
Qty: 90 TABLET | Refills: 1 | Status: SHIPPED | OUTPATIENT
Start: 2019-11-11 | End: 2020-05-07

## 2019-11-11 ASSESSMENT — PATIENT HEALTH QUESTIONNAIRE - PHQ9
SUM OF ALL RESPONSES TO PHQ QUESTIONS 1-9: 0
1. LITTLE INTEREST OR PLEASURE IN DOING THINGS: 0
SUM OF ALL RESPONSES TO PHQ9 QUESTIONS 1 & 2: 0
SUM OF ALL RESPONSES TO PHQ QUESTIONS 1-9: 0
DEPRESSION UNABLE TO ASSESS: FUNCTIONAL CAPACITY MOTIVATION LIMITS ACCURACY
2. FEELING DOWN, DEPRESSED OR HOPELESS: 0

## 2019-11-11 ASSESSMENT — ENCOUNTER SYMPTOMS
SHORTNESS OF BREATH: 0
GASTROINTESTINAL NEGATIVE: 1
CHEST TIGHTNESS: 0
RESPIRATORY NEGATIVE: 1
WHEEZING: 0

## 2019-11-13 ENCOUNTER — TELEPHONE (OUTPATIENT)
Dept: INTERNAL MEDICINE CLINIC | Age: 84
End: 2019-11-13

## 2019-11-13 DIAGNOSIS — D64.9 ANEMIA, UNSPECIFIED TYPE: Primary | ICD-10-CM

## 2019-11-13 DIAGNOSIS — D64.9 ANEMIA, UNSPECIFIED TYPE: ICD-10-CM

## 2019-11-13 LAB
FERRITIN: 137.2 NG/ML (ref 30–400)
FOLATE: >20 NG/ML (ref 4.78–24.2)
IRON SATURATION: 25 % (ref 20–50)
IRON: 69 UG/DL (ref 59–158)
TOTAL IRON BINDING CAPACITY: 278 UG/DL (ref 260–445)
VITAMIN B-12: 408 PG/ML (ref 211–911)

## 2019-11-21 RX ORDER — LEVOTHYROXINE SODIUM 88 MCG
TABLET ORAL
Qty: 90 TABLET | Refills: 0 | Status: SHIPPED | OUTPATIENT
Start: 2019-11-21 | End: 2020-02-04

## 2019-11-21 RX ORDER — ESCITALOPRAM OXALATE 10 MG/1
TABLET ORAL
Qty: 90 TABLET | Refills: 0 | Status: SHIPPED | OUTPATIENT
Start: 2019-11-21 | End: 2020-02-04

## 2020-01-06 RX ORDER — DOCUSATE SODIUM 100 MG/1
CAPSULE, LIQUID FILLED ORAL
Qty: 60 CAPSULE | Refills: 5 | Status: SHIPPED | OUTPATIENT
Start: 2020-01-06 | End: 2020-09-01

## 2020-02-04 RX ORDER — LEVOTHYROXINE SODIUM 88 MCG
TABLET ORAL
Qty: 90 TABLET | Refills: 0 | Status: SHIPPED | OUTPATIENT
Start: 2020-02-04 | End: 2020-05-07

## 2020-02-04 RX ORDER — ESCITALOPRAM OXALATE 10 MG/1
TABLET ORAL
Qty: 90 TABLET | Refills: 0 | Status: SHIPPED | OUTPATIENT
Start: 2020-02-04 | End: 2020-05-07

## 2020-03-11 ENCOUNTER — OFFICE VISIT (OUTPATIENT)
Dept: INTERNAL MEDICINE CLINIC | Age: 85
End: 2020-03-11
Payer: MEDICARE

## 2020-03-11 VITALS
DIASTOLIC BLOOD PRESSURE: 72 MMHG | OXYGEN SATURATION: 95 % | BODY MASS INDEX: 29.7 KG/M2 | SYSTOLIC BLOOD PRESSURE: 130 MMHG | HEART RATE: 72 BPM | WEIGHT: 207 LBS

## 2020-03-11 PROCEDURE — G8482 FLU IMMUNIZE ORDER/ADMIN: HCPCS | Performed by: INTERNAL MEDICINE

## 2020-03-11 PROCEDURE — 99214 OFFICE O/P EST MOD 30 MIN: CPT | Performed by: INTERNAL MEDICINE

## 2020-03-11 PROCEDURE — 1036F TOBACCO NON-USER: CPT | Performed by: INTERNAL MEDICINE

## 2020-03-11 PROCEDURE — 4040F PNEUMOC VAC/ADMIN/RCVD: CPT | Performed by: INTERNAL MEDICINE

## 2020-03-11 PROCEDURE — G8417 CALC BMI ABV UP PARAM F/U: HCPCS | Performed by: INTERNAL MEDICINE

## 2020-03-11 PROCEDURE — 1123F ACP DISCUSS/DSCN MKR DOCD: CPT | Performed by: INTERNAL MEDICINE

## 2020-03-11 PROCEDURE — G8427 DOCREV CUR MEDS BY ELIG CLIN: HCPCS | Performed by: INTERNAL MEDICINE

## 2020-03-11 ASSESSMENT — ENCOUNTER SYMPTOMS
GASTROINTESTINAL NEGATIVE: 1
RESPIRATORY NEGATIVE: 1
WHEEZING: 0
CHEST TIGHTNESS: 0
SHORTNESS OF BREATH: 0

## 2020-05-07 RX ORDER — PANTOPRAZOLE SODIUM 40 MG/1
TABLET, DELAYED RELEASE ORAL
Qty: 90 TABLET | Refills: 1 | Status: SHIPPED | OUTPATIENT
Start: 2020-05-07

## 2020-05-07 RX ORDER — LEVOTHYROXINE SODIUM 88 MCG
TABLET ORAL
Qty: 90 TABLET | Refills: 0 | Status: SHIPPED | OUTPATIENT
Start: 2020-05-07 | End: 2020-08-05

## 2020-05-07 RX ORDER — ESCITALOPRAM OXALATE 10 MG/1
TABLET ORAL
Qty: 90 TABLET | Refills: 0 | Status: SHIPPED | OUTPATIENT
Start: 2020-05-07 | End: 2020-08-05

## 2020-06-01 ENCOUNTER — TELEPHONE (OUTPATIENT)
Dept: INTERNAL MEDICINE CLINIC | Age: 85
End: 2020-06-01

## 2020-06-02 ENCOUNTER — HOSPITAL ENCOUNTER (OUTPATIENT)
Age: 85
Discharge: HOME | End: 2020-06-02
Payer: MEDICARE

## 2020-06-02 ENCOUNTER — VIRTUAL VISIT (OUTPATIENT)
Dept: INTERNAL MEDICINE CLINIC | Age: 85
End: 2020-06-02
Payer: MEDICARE

## 2020-06-02 ENCOUNTER — HOSPITAL ENCOUNTER (OUTPATIENT)
Dept: GENERAL RADIOLOGY | Age: 85
Discharge: HOME | End: 2020-06-02
Payer: MEDICARE

## 2020-06-02 PROCEDURE — 73030 X-RAY EXAM OF SHOULDER: CPT

## 2020-06-02 PROCEDURE — 99442 PR PHYS/QHP TELEPHONE EVALUATION 11-20 MIN: CPT | Performed by: INTERNAL MEDICINE

## 2020-06-02 NOTE — PROGRESS NOTES
Active member of club or organization: Not on file     Attends meetings of clubs or organizations: Not on file     Relationship status: Not on file    Intimate partner violence     Fear of current or ex partner: Not on file     Emotionally abused: Not on file     Physically abused: Not on file     Forced sexual activity: Not on file   Other Topics Concern    Not on file   Social History Narrative    Not on file       Family History   Problem Relation Age of Onset    Arthritis Mother     Dementia Mother     Stroke Father     Hypertension Father     Breast Cancer Sister     Thyroid Cancer Sister     Hypertension Brother     Diabetes Brother     Coronary Art Dis Brother        Documentation:  I communicated with the patient and/or health care decision maker about Hypertension. He hit his shoulder against the door two weeks ago. He denies pain. He states he has a knot on it. The knot is on the top of his shoulder. He states it does not hit. He can move his arm normally. He is able to flex his arm normally. He states it is staying about the same. Patient is taking blood pressure medication without problem  Patient is taking their thyroid medication without problem  Patient is taking their cholesterol medication and watching diet without problem. He denies chest pain or palpitations    Acute pain of left shoulder  Check x ray  Normal ROM per patient  No warmth or redness  Heating pad 10 minutes tid        I affirm this is a Patient Initiated Episode with an Established Patient who has not had a related appointment within my department in the past 7 days or scheduled within the next 24 hours.     Total Time: minutes: 11-20 minutes    Note: not billable if this call serves to triage the patient into an appointment for the relevant concern      Amaury Conway

## 2020-06-03 ENCOUNTER — OFFICE VISIT (OUTPATIENT)
Dept: ORTHOPEDIC SURGERY | Age: 85
End: 2020-06-03
Payer: MEDICARE

## 2020-06-03 VITALS — HEIGHT: 70 IN | WEIGHT: 207 LBS | BODY MASS INDEX: 29.63 KG/M2

## 2020-06-03 PROCEDURE — G8427 DOCREV CUR MEDS BY ELIG CLIN: HCPCS | Performed by: ORTHOPAEDIC SURGERY

## 2020-06-03 PROCEDURE — 99203 OFFICE O/P NEW LOW 30 MIN: CPT | Performed by: ORTHOPAEDIC SURGERY

## 2020-06-03 PROCEDURE — G8417 CALC BMI ABV UP PARAM F/U: HCPCS | Performed by: ORTHOPAEDIC SURGERY

## 2020-06-09 ASSESSMENT — PATIENT HEALTH QUESTIONNAIRE - PHQ9
SUM OF ALL RESPONSES TO PHQ QUESTIONS 1-9: 0
1. LITTLE INTEREST OR PLEASURE IN DOING THINGS: 0
2. FEELING DOWN, DEPRESSED OR HOPELESS: 0
SUM OF ALL RESPONSES TO PHQ9 QUESTIONS 1 & 2: 0
SUM OF ALL RESPONSES TO PHQ QUESTIONS 1-9: 0

## 2020-08-05 RX ORDER — LEVOTHYROXINE SODIUM 88 MCG
TABLET ORAL
Qty: 90 TABLET | Refills: 0 | Status: SHIPPED | OUTPATIENT
Start: 2020-08-05 | End: 2020-11-18

## 2020-08-05 RX ORDER — ESCITALOPRAM OXALATE 10 MG/1
TABLET ORAL
Qty: 90 TABLET | Refills: 0 | Status: SHIPPED | OUTPATIENT
Start: 2020-08-05 | End: 2020-11-18

## 2020-08-18 ENCOUNTER — TELEPHONE (OUTPATIENT)
Dept: INTERNAL MEDICINE CLINIC | Age: 85
End: 2020-08-18

## 2020-09-01 RX ORDER — DOCUSATE SODIUM 100 MG/1
CAPSULE, LIQUID FILLED ORAL
Qty: 60 CAPSULE | Refills: 0 | Status: SHIPPED | OUTPATIENT
Start: 2020-09-01 | End: 2020-10-06 | Stop reason: SDUPTHER

## 2020-09-17 ENCOUNTER — OFFICE VISIT (OUTPATIENT)
Dept: INTERNAL MEDICINE CLINIC | Age: 85
End: 2020-09-17
Payer: MEDICARE

## 2020-09-17 VITALS
TEMPERATURE: 97.8 F | SYSTOLIC BLOOD PRESSURE: 130 MMHG | WEIGHT: 208 LBS | OXYGEN SATURATION: 98 % | DIASTOLIC BLOOD PRESSURE: 70 MMHG | BODY MASS INDEX: 29.84 KG/M2 | HEART RATE: 78 BPM

## 2020-09-17 LAB
A/G RATIO: 1.8 (ref 1.1–2.2)
ALBUMIN SERPL-MCNC: 4 G/DL (ref 3.4–5)
ALP BLD-CCNC: 66 U/L (ref 40–129)
ALT SERPL-CCNC: 10 U/L (ref 10–40)
ANION GAP SERPL CALCULATED.3IONS-SCNC: 13 MMOL/L (ref 3–16)
AST SERPL-CCNC: 15 U/L (ref 15–37)
BASOPHILS ABSOLUTE: 0.1 K/UL (ref 0–0.2)
BASOPHILS RELATIVE PERCENT: 0.6 %
BILIRUB SERPL-MCNC: 0.5 MG/DL (ref 0–1)
BUN BLDV-MCNC: 28 MG/DL (ref 7–20)
CALCIUM SERPL-MCNC: 9.3 MG/DL (ref 8.3–10.6)
CHLORIDE BLD-SCNC: 102 MMOL/L (ref 99–110)
CHOLESTEROL, TOTAL: 139 MG/DL (ref 0–199)
CO2: 25 MMOL/L (ref 21–32)
CREAT SERPL-MCNC: 1.9 MG/DL (ref 0.8–1.3)
EOSINOPHILS ABSOLUTE: 0.1 K/UL (ref 0–0.6)
EOSINOPHILS RELATIVE PERCENT: 1.3 %
GFR AFRICAN AMERICAN: 41
GFR NON-AFRICAN AMERICAN: 34
GLOBULIN: 2.2 G/DL
GLUCOSE BLD-MCNC: 108 MG/DL (ref 70–99)
HCT VFR BLD CALC: 34.9 % (ref 40.5–52.5)
HDLC SERPL-MCNC: 39 MG/DL (ref 40–60)
HEMOGLOBIN: 11.8 G/DL (ref 13.5–17.5)
LDL CHOLESTEROL CALCULATED: 78 MG/DL
LYMPHOCYTES ABSOLUTE: 1.1 K/UL (ref 1–5.1)
LYMPHOCYTES RELATIVE PERCENT: 12.6 %
MCH RBC QN AUTO: 30.1 PG (ref 26–34)
MCHC RBC AUTO-ENTMCNC: 33.7 G/DL (ref 31–36)
MCV RBC AUTO: 89.2 FL (ref 80–100)
MONOCYTES ABSOLUTE: 0.7 K/UL (ref 0–1.3)
MONOCYTES RELATIVE PERCENT: 8.2 %
NEUTROPHILS ABSOLUTE: 6.5 K/UL (ref 1.7–7.7)
NEUTROPHILS RELATIVE PERCENT: 77.3 %
PDW BLD-RTO: 14.6 % (ref 12.4–15.4)
PHOSPHORUS: 3.7 MG/DL (ref 2.5–4.9)
PLATELET # BLD: 271 K/UL (ref 135–450)
PMV BLD AUTO: 8.9 FL (ref 5–10.5)
POTASSIUM SERPL-SCNC: 5.2 MMOL/L (ref 3.5–5.1)
PROSTATE SPECIFIC ANTIGEN: 4.3 NG/ML (ref 0–4)
RBC # BLD: 3.91 M/UL (ref 4.2–5.9)
SODIUM BLD-SCNC: 140 MMOL/L (ref 136–145)
TOTAL PROTEIN: 6.2 G/DL (ref 6.4–8.2)
TRIGL SERPL-MCNC: 111 MG/DL (ref 0–150)
TSH SERPL DL<=0.05 MIU/L-ACNC: 1.31 UIU/ML (ref 0.27–4.2)
VLDLC SERPL CALC-MCNC: 22 MG/DL
WBC # BLD: 8.4 K/UL (ref 4–11)

## 2020-09-17 PROCEDURE — 82274 ASSAY TEST FOR BLOOD FECAL: CPT | Performed by: INTERNAL MEDICINE

## 2020-09-17 PROCEDURE — 4040F PNEUMOC VAC/ADMIN/RCVD: CPT | Performed by: INTERNAL MEDICINE

## 2020-09-17 PROCEDURE — 93000 ELECTROCARDIOGRAM COMPLETE: CPT | Performed by: INTERNAL MEDICINE

## 2020-09-17 PROCEDURE — 90694 VACC AIIV4 NO PRSRV 0.5ML IM: CPT | Performed by: INTERNAL MEDICINE

## 2020-09-17 PROCEDURE — G0008 ADMIN INFLUENZA VIRUS VAC: HCPCS | Performed by: INTERNAL MEDICINE

## 2020-09-17 PROCEDURE — 1123F ACP DISCUSS/DSCN MKR DOCD: CPT | Performed by: INTERNAL MEDICINE

## 2020-09-17 PROCEDURE — G0439 PPPS, SUBSEQ VISIT: HCPCS | Performed by: INTERNAL MEDICINE

## 2020-09-17 ASSESSMENT — PATIENT HEALTH QUESTIONNAIRE - PHQ9
SUM OF ALL RESPONSES TO PHQ9 QUESTIONS 1 & 2: 0
SUM OF ALL RESPONSES TO PHQ QUESTIONS 1-9: 0
1. LITTLE INTEREST OR PLEASURE IN DOING THINGS: 0
2. FEELING DOWN, DEPRESSED OR HOPELESS: 0
SUM OF ALL RESPONSES TO PHQ QUESTIONS 1-9: 0

## 2020-09-17 ASSESSMENT — ENCOUNTER SYMPTOMS
NAUSEA: 0
EYE REDNESS: 0
APNEA: 0
BLOOD IN STOOL: 0
ABDOMINAL DISTENTION: 0
CONSTIPATION: 1
CHOKING: 0
DIARRHEA: 0
ABDOMINAL PAIN: 0
STRIDOR: 0
VOICE CHANGE: 0
FACIAL SWELLING: 0
CHEST TIGHTNESS: 0
VOMITING: 0
COUGH: 0
RECTAL PAIN: 0
EYE ITCHING: 0
TROUBLE SWALLOWING: 0
RHINORRHEA: 0
PHOTOPHOBIA: 0
EYE DISCHARGE: 0
WHEEZING: 0
SHORTNESS OF BREATH: 0
BACK PAIN: 1
SORE THROAT: 0
EYE PAIN: 0
COLOR CHANGE: 0
ANAL BLEEDING: 0
SINUS PRESSURE: 0

## 2020-09-17 NOTE — PROGRESS NOTES
Subjective:      Patient ID: Fabrice Vaz is a 80 y.o. male. Chief Complaint   Patient presents with    Medicare AWV       HPI  Patient is here for AWV. Review of Systems   Constitutional: Positive for fever. Negative for activity change, appetite change, chills, diaphoresis, fatigue and unexpected weight change. HENT: Negative for congestion, dental problem, drooling, ear discharge, ear pain, facial swelling, hearing loss, mouth sores, nosebleeds, postnasal drip, rhinorrhea, sinus pressure, sneezing, sore throat, tinnitus, trouble swallowing and voice change. Eyes: Negative for photophobia, pain, discharge, redness, itching and visual disturbance. Respiratory: Negative for apnea, cough, choking, chest tightness, shortness of breath, wheezing and stridor. Cardiovascular: Negative for chest pain, palpitations and leg swelling. Gastrointestinal: Positive for constipation. Negative for abdominal distention, abdominal pain, anal bleeding, blood in stool, diarrhea, nausea, rectal pain and vomiting. Genitourinary: Negative for decreased urine volume, difficulty urinating, discharge, dysuria, enuresis, flank pain, frequency, genital sores, hematuria, penile pain, penile swelling, scrotal swelling, testicular pain and urgency. Musculoskeletal: Positive for arthralgias and back pain. Negative for gait problem, joint swelling, myalgias, neck pain and neck stiffness. Skin: Negative for color change, pallor, rash and wound. Neurological: Negative for dizziness, tremors, seizures, syncope, facial asymmetry, speech difficulty, weakness, light-headedness, numbness and headaches. Hematological: Negative for adenopathy. Does not bruise/bleed easily. Objective:   Physical Exam  Constitutional:       Appearance: Normal appearance. He is well-developed. HENT:      Head: Normocephalic and atraumatic.       Right Ear: Tympanic membrane and ear canal normal.      Left Ear: Tympanic membrane and ear Wesley Ivory Date: 2020   MRN: [id number] Sex: Male   Age: 80 y.o. Ethnicity: Non-/Non    : 1934 Race: Ambar Gonsalves is here for Medicare AWV    Screenings for behavioral, psychosocial and functional/safety risks, and cognitive dysfunction are all negative except as indicated below. These results, as well as other patient data from the [address] form, are documented in Flowsheets linked to this Encounter. Allergies   Allergen Reactions    Ciprofloxacin Hives    Methylprednisolone Other (See Comments)     Dizzy         Prior to Visit Medications    Medication Sig Taking? Authorizing Provider    MG capsule TAKE 1 CAPSULE BY MOUTH TWICE DAILY Yes Promise Park MD   SYNTHROID 88 MCG tablet TAKE 1 TABLET BY MOUTH DAILY Yes SOFY Lan CNP   escitalopram (LEXAPRO) 10 MG tablet TAKE 1 TABLET BY MOUTH DAILY Yes SOFY Lan CNP   pantoprazole (PROTONIX) 40 MG tablet TAKE 1 TABLET BY MOUTH EVERY MORNING BEFORE BREAKFAST Yes Promise Park MD   clobetasol (TEMOVATE) 0.05 % cream Apply twice a day as needed. Yes Promise Park MD   vitamin D (ERGOCALCIFEROL) 91121 units CAPS capsule Take 1 capsule by mouth once a week Patient to start 1000 IU after 12 weeks of medication Yes Promise Park MD   ramipril (ALTACE) 10 MG capsule TAKE 1 CAPSULE BY MOUTH DAILY Yes SOFY Osuna CNP   tamsulosin (FLOMAX) 0.4 MG capsule Take 0.8 mg by mouth daily  Yes Historical Provider, MD   pravastatin (PRAVACHOL) 40 MG tablet TAKE 1 TABLET BY MOUTH AT BEDTIME Yes Hans Panda MD   flecainide (TAMBOCOR) 100 MG tablet Take 100 mg by mouth 2 times daily. Yes Historical Provider, MD   digoxin (LANOXIN) 0.125 MG tablet Take 125 mcg by mouth daily. Yes Historical Provider, MD   aspirin 81 MG tablet Take 81 mg by mouth daily.  Yes Historical Provider, MD   therapeutic multivitamin-minerals [facility]) tablet Take 1 tablet by mouth daily.  Yes Historical Provider, MD         Past Medical History:   Diagnosis Date    Achalasia     Cholelithiasis     Colon polyps     Elevated prostate specific antigen (PSA)     GERD (gastroesophageal reflux disease)     Gout     HTN (hypertension), benign     Hyperlipidemia     Hypothyroidism     Macular degeneration     Malignant neoplasm of parotid gland (HCC)     parotid glad cancer    Paroxysmal atrial fibrillation ([facility] [address]     Renal cyst, left     Sleep apnea 2012       Past Surgical History:   Procedure Laterality Date    CHOLECYSTECTOMY  2001    COLONOSCOPY  1991    Polyp    COLONOSCOPY  9/8/2005    Adenomatous polyp    COLONOSCOPY  11/6/2008    Adenomatous polyp    COLONOSCOPY  1/30/2012    Unremarkable    CYST REMOVAL  2005    Chest wall epidermal inclusion cyst    HYDROCELE EXCISION  1983    IRIDOTOMY / IRIDECTOMY  2006    Laser    PAROTIDECTOMY  12/4/2007    Left parotid for cancer    PROSTATE SURGERY  2/3/2005    TUNA    UPPER GASTROINTESTINAL ENDOSCOPY  1997    Gastritis    UPPER GASTROINTESTINAL ENDOSCOPY  5/2012    Esophageal dilatation    UPPER GASTROINTESTINAL ENDOSCOPY  12/10/2012    Esophageal dilatation    UPPER GASTROINTESTINAL ENDOSCOPY  4/19/2013    Esophageal dilatation         Family History   Problem Relation Age of Onset    Arthritis Mother     Dementia Mother     Stroke Father     Hypertension Father     Breast Cancer Sister     Thyroid Cancer Sister     Hypertension Brother     Diabetes Brother     Coronary Art Dis Brother        CareTeam (Including outside providers/suppliers regularly involved in providing care):   Patient Care Team:  Promise Park MD as PCP - Rocio Apodaca MD as PCP - [facility] Empaneled Provider    Wt Readings from Last 3 Encounters:   09/17/20 208 lb (94.3 kg)   06/03/20 207 lb (93.9 kg)   03/11/20 207 lb (93.9 kg)     Vitals:    09/17/20 0939   BP: 130/70   Pulse: 78   Temp: 97.8 °F (36.6 °C)   SpO2: 98%   Weight: 208 lb (94.3 kg)     Body mass index is 29.84 kg/m². Based upon direct observation of the patient, evaluation of cognition reveals recent and remote memory intact. Patient's complete Health Risk Assessment and screening values have been reviewed and are found in Flowsheets. The following problems were reviewed today and where indicated follow up appointments were made and/or referrals ordered.     Positive Risk Factor Screenings with Interventions:     Hearing/Vision:  No exam data present  Hearing/Vision  Do you or your family notice any trouble with your hearing?: No  Do you have difficulty driving, watching TV, or doing any of your daily activities because of your eyesight?: No  Have you had an eye exam within the past year?: (!) No  Hearing/Vision Interventions:  · Vision concerns:  patient encouraged to make appointment with his/her eye specialist    Personalized Preventive Plan   Current Health Maintenance Status  Immunization History   Administered Date(s) Administered    Influenza 11/04/2013    Influenza Vaccine, unspecified formulation 11/04/2013, 09/01/2015, 09/01/2016    Influenza Virus Vaccine 10/10/2009, 10/19/2010, 10/15/2011, 10/15/2012, 10/15/2014    Influenza Whole 10/15/2014    Influenza, High Dose (Fluzone 65 yrs and older) 09/01/2015, 09/01/2016, 11/06/2017, 09/21/2018    Influenza, Quadv, adjuvanted, 65 yrs +, IM, PF (Fluad) 09/17/2020    Influenza, Triv, inactivated, subunit, adjuvanted, IM (Fluad 65 yrs and older) 11/11/2019    Pneumococcal Conjugate 13-valent (Shadsbj44) 05/26/2015    Pneumococcal Polysaccharide ([id number]) 08/22/2000    Td, unspecified formulation 06/23/2004    Tdap (Boostrix, Adacel) 02/12/2014    Zoster Live (Zostavax) 10/15/2014    Zoster Recombinant (Shingrix) 10/21/2019, 12/30/2019        Health Maintenance   Topic Date Due    Annual Wellness Visit (AWV)  05/29/2019    TSH testing  07/11/2020    Lipid screen

## 2020-09-17 NOTE — PATIENT INSTRUCTIONS
times.    Follow-up care is a key part of your treatment and safety. Be sure to make and go to all appointments, and call your doctor if you are having problems. It's also a good idea to know your test results and keep a list of the medicines you take. Where can you learn more? Go to [email or web address] and sign in to your [employer] account. Enter Q369 in the [employer] box to learn more about \"Quadricep Muscle Strain: Rehab Exercises. \"     If you do not have an account, please click on the \"Sign Up Now\" link. Current as of: March 2, 2020               Content Version: 12.5  © 2006-2020 Healthwise, Incorporated. Care instructions adapted under license by [facility] ([facility]). If you have questions about a medical condition or this instruction, always ask your healthcare professional. [place] [address] any warranty or liability for your use of this information. Personalized Preventive Plan for Bronson Flores - 9/17/2020  Medicare offers a range of preventive health benefits. Some of the tests and screenings are paid in full while other may be subject to a deductible, co-insurance, and/or copay. Some of these benefits include a comprehensive review of your medical history including lifestyle, illnesses that may run in your family, and various assessments and screenings as appropriate. After reviewing your medical record and screening and assessments performed today your provider may have ordered immunizations, labs, imaging, and/or referrals for you. A list of these orders (if applicable) as well as your Preventive Care list are included within your After Visit Summary for your review. Other Preventive Recommendations:    · A preventive eye exam performed by an eye specialist is recommended every 1-2 years to screen for glaucoma; cataracts, macular degeneration, and other eye disorders. · A preventive dental visit is recommended every 6 months.   · Try to get at least 150 minutes of exercise per week or 10,000 steps per day on a pedometer . · Order or download the FREE \"Exercise & Physical Activity: Your Everyday Guide\" from [employer]. Call 5-361.975.4612 or search [employer] online. · You need 2926-6075 mg of calcium and 8026-1249 IU of vitamin D per day. It is possible to meet your calcium requirement with diet alone, but a vitamin D supplement is usually necessary to meet this goal.  · When exposed to the sun, use a sunscreen that protects against both UVA and UVB radiation with an SPF of 30 or greater. Reapply every 2 to 3 hours or after sweating, drying off with a towel, or swimming. · Always wear a seat belt when traveling in a car. Always wear a helmet when riding a bicycle or motorcycle. Heart-Healthy Diet   Sodium, Fat, and Cholesterol Controlled Diet       What Is a Heart Healthy Diet? A heart-healthy diet is one that limits sodium , certain types of fat , and cholesterol . This type of diet is recommended for:   People with any form of cardiovascular disease (eg, coronary heart disease , peripheral vascular disease , previous heart attack , previous stroke )   People with risk factors for cardiovascular disease, such as high blood pressure , high cholesterol , or diabetes   Anyone who wants to lower their risk of developing cardiovascular disease   Sodium    Sodium is a mineral found in many foods. In general, most people consume much more sodium than they need. Diets high in sodium can increase blood pressure and lead to edema (water retention). On a heart-healthy diet, you should consume no more than 2,300 mg (milligrams) of sodium per dayabout the amount in one teaspoon of table salt. The foods highest in sodium include table salt (about 50% sodium), processed foods, convenience foods, and preserved foods. Cholesterol    Cholesterol is a fat-like, waxy substance in your blood.  Our bodies make some particularly those found in fatty fish (such as salmon, trout, tuna, mackerel, herring, and sardines); can decrease risk of arrhythmias, decrease triglyceride levels, and slightly lower blood pressure   The fats that you want to limit are:   Saturated fat  found in animal products, many fast foods, and a few vegetables; increases total blood cholesterol, including LDL levels   Animal fats that are saturated include: butter, lard, whole-milk dairy products, meat fat, and poultry skin   Vegetable fats that are saturated include: hydrogenated shortening, palm oil, coconut oil, cocoa butter   Hydrogenated or trans fat  found in margarine and vegetable shortening, most shelf stable snack foods, and fried foods; increases LDL and decreases HDL     It is generally recommended that you limit your total fat for the day to less than 30% of your total calories. If you follow an 1800-calorie heart healthy diet, for example, this would mean 60 grams of fat or less per day. Saturated fat and trans fat in your diet raises your blood cholesterol the most, much more than dietary cholesterol does. For this reason, on a heart-healthy diet, less than 7% of your calories should come from saturated fat and ideally 0% from trans fat. On an 1800-calorie diet, this translates into less than 14 grams of saturated fat per day, leaving 46 grams of fat to come from mono- and polyunsaturated fats.    Food Choices on a Heart Healthy Diet   Food Category   Foods Recommended   Foods to Avoid   Grains   Breads and rolls without salted tops Most dry and cooked cereals Unsalted crackers and breadsticks Low-sodium or homemade breadcrumbs or stuffing All rice and pastas   Breads, rolls, and crackers with salted tops High-fat baked goods (eg, muffins, donuts, pastries) Quick breads, self-rising flour, and biscuit mixes Regular bread crumbs Instant hot cereals Commercially prepared rice, pasta, or stuffing mixes   Vegetables   Most fresh, frozen, and low-sodium canned vegetables Low-sodium and salt-free vegetable juices Canned vegetables if unsalted or rinsed   Regular canned vegetables and juices, including sauerkraut and pickled vegetables Frozen vegetables with sauces Commercially prepared potato and vegetable mixes   Fruits   Most fresh, frozen, and canned fruits All fruit juices   Fruits processed with salt or sodium   Milk   Nonfat or low-fat (1%) milk Nonfat or low-fat yogurt Cottage cheese, low-fat ricotta, cheeses labeled as low-fat and low-sodium   Whole milk Reduced-fat (2%) milk Malted and chocolate milk Full fat yogurt Most cheeses (unless low-fat and low salt) Buttermilk (no more than 1 cup per week)   Meats and Beans   Lean cuts of fresh or frozen beef, veal, lamb, or pork (look for the word loin) Fresh or frozen poultry without the skin Fresh or frozen fish and some shellfish Egg whites and egg substitutes (Limit whole eggs to three per week) Tofu Nuts or seeds (unsalted, dry-roasted), low-sodium peanut butter Dried peas, beans, and lentils   Any smoked, cured, salted, or canned meat, fish, or poultry (including walter, chipped beef, cold cuts, hot dogs, sausages, sardines, and anchovies) Poultry skins Breaded and/or fried fish or meats Canned peas, beans, and lentils Salted nuts   Fats and Oils   Olive oil and canola oil Low-sodium, low-fat salad dressings and mayonnaise   Butter, margarine, coconut and palm oils, walter fat   Snacks, Sweets, and Condiments   Low-sodium or unsalted versions of broths, soups, soy sauce, and condiments Pepper, herbs, and spices; vinegar, lemon, or lime juice Low-fat frozen desserts (yogurt, sherbet, fruit bars) Sugar, cocoa powder, honey, syrup, jam, and preserves Low-fat, trans-fat free cookies, cakes, and pies John and animal crackers, fig bars, diana snaps   High-fat desserts Broth, soups, gravies, and sauces, made from instant mixes or other high-sodium ingredients Salted snack foods Canned olives Meat tenderizers, seasoning salt, and most flavored vinegars   Beverages   Low-sodium carbonated beverages Tea and coffee in moderation Soy milk   Commercially softened water   Suggestions   Make whole grains, fruits, and vegetables the base of your diet. Choose heart-healthy fats such as canola, olive, and flaxseed oil, and foods high in heart-healthy fats, such as nuts, seeds, soybeans, tofu, and fish. Eat fish at least twice per week; the fish highest in omega-3 fatty acids and lowest in mercury include salmon, herring, mackerel, sardines, and canned chunk light tuna. If you eat fish less than twice per week or have high triglycerides, talk to your doctor about taking fish oil supplements. Read food labels. For products low in fat and cholesterol, look for fat free, low-fat, cholesterol free, saturated fat free, and trans fat freeAlso scan the Nutrition Facts Label, which lists saturated fat, trans fat, and cholesterol amounts. For products low in sodium, look for sodium free, very low sodium, low sodium, no added salt, and unsalted   Skip the salt when cooking or at the table; if food needs more flavor, get creative and try out different herbs and spices. Garlic and onion also add substantial flavor to foods. Trim any visible fat off meat and poultry before cooking, and drain the fat off after [place]. Use cooking methods that require little or no added fat, such as grilling, boiling, baking, poaching, broiling, roasting, steaming, stir-frying, and sauting. Avoid fast food and convenience food. They tend to be high in saturated and trans fat and have a lot of added salt. Talk to a registered dietitian for individualized diet advice. Last Reviewed: March 2011 Mira Betts MS, MPH, RD   Updated: 3/29/2011   ·     Keeping Home a [facility]       As we get older, changes in balance, gait, strength, vision, hearing, and cognition make even the most youthful senior more prone to accidents. important to check for potential hazards in each room. And remember, proper lighting is an essential factor in home safety. If you cannot see clearly, you are more likely to fall. Important questions to ask yourself include:   Are lamp, electric, extension, and telephone cords placed out of the flow of traffic and maintained in good condition? Have frayed cords been replaced? Are all small rugs and runners slip resistant? If not, you can secure them to the floor with a special double-sided carpet tape. Are smoke detectors properly locatedone on every floor of your home and one outside of every sleeping area? Are they in good working order? Are batteries replaced at least once a year? Do you have a well-maintained carbon monoxide detector outside every sleeping are in your home? Does your furniture layout leave plenty of space to maneuver between and around chairs, tables, beds, and sofas? Are hallways, stairs and passages between rooms well lit? Can you reach a lamp without getting out of bed? Are floor surfaces well maintained? Shag rugs, high-pile carpeting, tile floors, and polished wood floors can be particularly slippery. Stairs should always have handrails and be carpeted or fitted with a non-skid tread. Is your telephone easily reachable. Is the cord safely tucked away? Room by Room   According to the Association of Aging, bathrooms and анна are the two most potentially hazardous rooms in your home. In the Kitchen    Be sure your stove is in proper working order and always make sure burners and the oven are off before you go out or go to sleep. Keep pots on the back burners, turn handles away from the front of the stove, and keep stove clean and free of grease build-up. Kitchen ventilation systems and range exhausts should be working properly. Keep flammable objects such as towels and pot holders away from the cooking area except when in use.  Make sure kitchen curtains are

## 2020-09-18 LAB
BACTERIA: ABNORMAL /HPF
BILIRUBIN URINE: NEGATIVE
BLOOD, URINE: NEGATIVE
CLARITY: CLEAR
COLOR: YELLOW
CONTROL: NORMAL
EPITHELIAL CELLS, UA: 5 /HPF (ref 0–5)
GLUCOSE URINE: NEGATIVE MG/DL
HEMOCCULT STL QL: NORMAL
HYALINE CASTS: 2 /LPF (ref 0–8)
KETONES, URINE: NEGATIVE MG/DL
LEUKOCYTE ESTERASE, URINE: ABNORMAL
MICROSCOPIC EXAMINATION: YES
NITRITE, URINE: NEGATIVE
PH UA: 6 (ref 5–8)
PROTEIN UA: NEGATIVE MG/DL
RBC UA: 2 /HPF (ref 0–4)
SPECIFIC GRAVITY UA: 1.01 (ref 1–1.03)
URINE TYPE: ABNORMAL
UROBILINOGEN, URINE: 0.2 E.U./DL
WBC UA: 15 /HPF (ref 0–5)

## 2020-10-02 ENCOUNTER — TELEPHONE (OUTPATIENT)
Dept: INTERNAL MEDICINE CLINIC | Age: 85
End: 2020-10-02

## 2020-10-05 DIAGNOSIS — E87.5 SERUM POTASSIUM ELEVATED: ICD-10-CM

## 2020-10-05 LAB — POTASSIUM SERPL-SCNC: 4.8 MMOL/L (ref 3.5–5.1)

## 2020-10-06 ENCOUNTER — TELEPHONE (OUTPATIENT)
Dept: INTERNAL MEDICINE CLINIC | Age: 85
End: 2020-10-06

## 2020-10-06 RX ORDER — DOCUSATE SODIUM 100 MG/1
CAPSULE, LIQUID FILLED ORAL
Qty: 60 CAPSULE | Refills: 0 | Status: SHIPPED | OUTPATIENT
Start: 2020-10-06 | End: 2021-09-23

## 2020-10-06 NOTE — TELEPHONE ENCOUNTER
Patient requesting a medication refill.   Medication docusate sodium (COLACE)  Dosage  100 MG capsule   FrequencyTake 1 capsule by mouth 2 times daily   Last filled on 1/6/20  [address]arm[address]  [facility] [address] [phone]

## 2020-10-17 PROBLEM — Z00.00 ROUTINE GENERAL MEDICAL EXAMINATION AT A HEALTH CARE FACILITY: Status: RESOLVED | Noted: 2019-07-11 | Resolved: 2020-10-17

## 2020-10-30 ENCOUNTER — NURSE TRIAGE (OUTPATIENT)
Dept: OTHER | Facility: CLINIC | Age: 85
End: 2020-10-30

## 2020-10-30 ENCOUNTER — TELEPHONE (OUTPATIENT)
Dept: INTERNAL MEDICINE CLINIC | Age: 85
End: 2020-10-30

## 2020-10-30 NOTE — TELEPHONE ENCOUNTER
----- Message from Geovanni Latif sent at 10/30/2020  3:53 PM EDT -----  Subject: Appointment Request    Reason for Call: Semi-Routine Return from RN Triage    QUESTIONS  Type of Appointment? Established Patient  Reason for appointment request? No appointments available during search  Additional Information for Provider? patient needs to be seen ASAP on   Monday. return from [facility]   pain on lower right side of back. ---------------------------------------------------------------------------  --------------  [place] [email or web address] [facility]  What is the best way for the office to contact you? OK to leave message on   voicemail  Preferred Call Back Phone Number? [phone]  ---------------------------------------------------------------------------  --------------  SCRIPT ANSWERS  Patient needs to be seen within 5 days? Yes  Nurse Name? Raghav Cheung  (Did RN indicate the need for Red Scheduling?)? No  Have you been diagnosed with COVID-19 (Coronavirus)   tested for COVID-19 (Coronavirus)   or told that you are suspected of having COVID-19 (Coronavirus)? No  Have you had a fever or taken medication to treat a fever within the past   3 days? No  Have you had a cough   shortness of breath or flu-like symptoms within the past 3 days? No  Do you currently have flu-like symptoms including fever or chills   cough   shortness of breath   or difficulty breathing   or new loss of taste or smell? No  (Service Expert  click yes below to proceed with Say-Hey As Usual   Scheduling)?  Yes

## 2020-11-02 ENCOUNTER — TELEPHONE (OUTPATIENT)
Dept: INTERNAL MEDICINE CLINIC | Age: 85
End: 2020-11-02

## 2020-11-02 NOTE — TELEPHONE ENCOUNTER
Patient called and LVM advising to call our office back to schedule next appointment with Reanna Caba or Luther Mead our Np's.    Left our number and advising to press option 6

## 2020-11-18 RX ORDER — ESCITALOPRAM OXALATE 10 MG/1
TABLET ORAL
Qty: 90 TABLET | Refills: 0 | Status: SHIPPED | OUTPATIENT
Start: 2020-11-18 | End: 2021-04-05 | Stop reason: SDUPTHER

## 2020-11-18 RX ORDER — LEVOTHYROXINE SODIUM 88 MCG
TABLET ORAL
Qty: 90 TABLET | Refills: 0 | Status: SHIPPED | OUTPATIENT
Start: 2020-11-18 | End: 2021-03-05 | Stop reason: SDUPTHER

## 2020-12-08 ENCOUNTER — OFFICE VISIT (OUTPATIENT)
Dept: INTERNAL MEDICINE CLINIC | Age: 85
End: 2020-12-08
Payer: MEDICARE

## 2020-12-08 VITALS
WEIGHT: 208 LBS | DIASTOLIC BLOOD PRESSURE: 76 MMHG | TEMPERATURE: 97.7 F | HEART RATE: 68 BPM | OXYGEN SATURATION: 97 % | BODY MASS INDEX: 29.84 KG/M2 | SYSTOLIC BLOOD PRESSURE: 122 MMHG

## 2020-12-08 PROCEDURE — G8427 DOCREV CUR MEDS BY ELIG CLIN: HCPCS | Performed by: NURSE PRACTITIONER

## 2020-12-08 PROCEDURE — 1036F TOBACCO NON-USER: CPT | Performed by: NURSE PRACTITIONER

## 2020-12-08 PROCEDURE — 4040F PNEUMOC VAC/ADMIN/RCVD: CPT | Performed by: NURSE PRACTITIONER

## 2020-12-08 PROCEDURE — 99214 OFFICE O/P EST MOD 30 MIN: CPT | Performed by: NURSE PRACTITIONER

## 2020-12-08 PROCEDURE — G8417 CALC BMI ABV UP PARAM F/U: HCPCS | Performed by: NURSE PRACTITIONER

## 2020-12-08 PROCEDURE — G8484 FLU IMMUNIZE NO ADMIN: HCPCS | Performed by: NURSE PRACTITIONER

## 2020-12-08 PROCEDURE — 1123F ACP DISCUSS/DSCN MKR DOCD: CPT | Performed by: NURSE PRACTITIONER

## 2020-12-08 NOTE — PROGRESS NOTES
Subjective:      Patient ID: Samra Evans is a 80 y.o. male. Chief Complaint   Patient presents with    Leg Pain     right    Foot Pain     right      HPI   Here for worsening right hip and leg pain that has been ongoing for about a year. Pain is worse with movement. He has tried tylenol and home exercises which has not helped. Underwent xray of the lower back in 2018 which showed severe arthritic changes to L4-L5. He reports sharp and throbbing pains that radiate down the right thigh to the knee. Pain worsens during position change. Worse when he he been sitting for long periods. Eases slights with movement. He cannot take NSAIDs due to renal function. Pain has been gradually worsening over time. He had labs done per cardiology a few months ago. Renal function is worsening. It was suggested he nephrology. He is considering this. Review of Systems   Constitutional: Negative for chills, fatigue and fever. HENT: Negative for congestion, sinus pressure and sinus pain. Respiratory: Negative for cough, shortness of breath and wheezing. Cardiovascular: Negative for chest pain and palpitations. Gastrointestinal: Negative for abdominal pain, constipation and diarrhea. Musculoskeletal: Positive for arthralgias (Right hip) and back pain. Negative for myalgias. Skin: Negative for color change, pallor and rash. Neurological: Negative for dizziness, syncope, weakness, light-headedness and headaches. Psychiatric/Behavioral: Negative for behavioral problems, confusion and sleep disturbance. The patient is not nervous/anxious. Objective:   Physical Exam  Constitutional:       Appearance: He is well-developed. HENT:      Head: Normocephalic and atraumatic. Eyes:      Conjunctiva/sclera: Conjunctivae normal.      Pupils: Pupils are equal, round, and reactive to light. Neck:      Musculoskeletal: Normal range of motion and neck supple. Thyroid: No thyromegaly. Vascular: No JVD. Cardiovascular:      Rate and Rhythm: Normal rate and regular rhythm. Heart sounds: Normal heart sounds. Pulmonary:      Effort: Pulmonary effort is normal. No respiratory distress. Breath sounds: Normal breath sounds. No wheezing. Musculoskeletal:         General: No deformity. Right hip: He exhibits normal range of motion. Lumbar back: He exhibits decreased range of motion. Skin:     General: Skin is warm and dry. Capillary Refill: Capillary refill takes less than 2 seconds. Neurological:      Mental Status: He is alert and oriented to person, place, and time. Psychiatric:         Behavior: Behavior normal.         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 Information given to evaluate options of treatment, understand what is needed and discuss importance of following plan.

## 2020-12-09 PROBLEM — I20.9 ANGINA PECTORIS WITH NORMAL CORONARY ARTERIOGRAM (HCC): Status: ACTIVE | Noted: 2020-12-09

## 2020-12-09 ASSESSMENT — ENCOUNTER SYMPTOMS
ABDOMINAL PAIN: 0
COUGH: 0
SHORTNESS OF BREATH: 0
SINUS PRESSURE: 0
SINUS PAIN: 0
COLOR CHANGE: 0
DIARRHEA: 0
BACK PAIN: 1
CONSTIPATION: 0
WHEEZING: 0

## 2020-12-09 NOTE — ASSESSMENT & PLAN NOTE
Worsening symptoms   Continue Tylenol   Start physical therapy   Consider MRI/ ortho referral if no improvement   Call if symptoms worsen or fail to improve

## 2020-12-10 ENCOUNTER — TELEPHONE (OUTPATIENT)
Dept: DERMATOLOGY | Age: 85
End: 2020-12-10

## 2020-12-14 ENCOUNTER — TELEPHONE (OUTPATIENT)
Dept: INTERNAL MEDICINE CLINIC | Age: 85
End: 2020-12-14

## 2020-12-14 NOTE — TELEPHONE ENCOUNTER
Patient requesting a medication refill.   Medication docusate sodium (DOK)   Dosage  100 MG capsule   FrequencyTAKE 1 CAPSULE BY MOUTH TWICE DAILY  Last filled on 10/6/20  [facility] [address] [place] [address] [address], [address] [facility] -  [phone] - F [phone]

## 2020-12-15 ENCOUNTER — HOSPITAL ENCOUNTER (OUTPATIENT)
Dept: PHYSICAL THERAPY | Age: 85
Setting detail: THERAPIES SERIES
Discharge: HOME | End: 2020-12-15
Payer: MEDICARE

## 2020-12-15 ENCOUNTER — TELEPHONE (OUTPATIENT)
Dept: INTERNAL MEDICINE CLINIC | Age: 85
End: 2020-12-15

## 2020-12-15 PROCEDURE — 97161 PT EVAL LOW COMPLEX 20 MIN: CPT

## 2020-12-15 PROCEDURE — 97110 THERAPEUTIC EXERCISES: CPT

## 2020-12-15 NOTE — TELEPHONE ENCOUNTER
Pt asking if Clint Cruz will place lab orders so he can have them drawn tomorrow and his nephrologist and cardiologist can see them later this week

## 2020-12-16 ASSESSMENT — PAIN DESCRIPTION - FREQUENCY: FREQUENCY: INTERMITTENT

## 2020-12-16 ASSESSMENT — PAIN DESCRIPTION - DIRECTION: RADIATING_TOWARDS: ANTERIOR THIGH

## 2020-12-16 ASSESSMENT — PAIN SCALES - GENERAL: PAINLEVEL_OUTOF10: 8

## 2020-12-16 ASSESSMENT — PAIN DESCRIPTION - ORIENTATION: ORIENTATION: RIGHT

## 2020-12-16 ASSESSMENT — PAIN DESCRIPTION - LOCATION: LOCATION: HIP

## 2020-12-16 ASSESSMENT — PAIN DESCRIPTION - PAIN TYPE: TYPE: CHRONIC PAIN;REFERRED PAIN

## 2020-12-16 ASSESSMENT — PAIN DESCRIPTION - DESCRIPTORS: DESCRIPTORS: ACHING

## 2020-12-17 ASSESSMENT — PAIN DESCRIPTION - DESCRIPTORS: DESCRIPTORS: ACHING

## 2020-12-17 ASSESSMENT — PAIN DESCRIPTION - PAIN TYPE: TYPE: CHRONIC PAIN

## 2020-12-17 ASSESSMENT — PAIN DESCRIPTION - LOCATION: LOCATION: HIP

## 2020-12-17 ASSESSMENT — PAIN DESCRIPTION - ORIENTATION: ORIENTATION: RIGHT

## 2020-12-17 ASSESSMENT — PAIN DESCRIPTION - FREQUENCY: FREQUENCY: INTERMITTENT

## 2020-12-17 ASSESSMENT — PAIN SCALES - GENERAL: PAINLEVEL_OUTOF10: 8

## 2020-12-17 ASSESSMENT — PAIN DESCRIPTION - DIRECTION: RADIATING_TOWARDS: ANTERIOR THIGH

## 2020-12-17 NOTE — PLAN OF CARE
Outpatient Physical Therapy  Phone: [phone] Fax: [phone]    To:  Dr Jerod Sterling  From: Gaviota Betancourt, PT   Date: 12/15/2020  Patient: Jaquelin Wassemran     : 1934 MRN: [id number]  Diagnosis: Diagnosis: M53.87   Treatment Diagnosis: Treatment Diagnosis: R LE pain and weakness     Physical Therapy Certification/Re-Certification Form  Dear Dr. Lu Walton  The following patient has been evaluated for physical therapy services and for therapy to continue, Medicare requires monthly physician review of the treatment plan. Please review the attached evaluation and/or summary of the patient's plan of care, and verify that you agree therapy should continue by signing the attached document and sending it back to our office.     Plan of Care/Treatment to date:  [x] Therapeutic Exercise (Review/Progress HEP and provide verbal/tactile cueing for activities related to strengthening, flexibility,  endurance, ROM.)       [x] Therapeutic Activity (Provide verbal/tactile cueing for dynamic activities to promote functional tasks.)          [x] Gait Training (Provide verbal/tactile/visual cueing for facilitation of normalized gait pattern without or with the least restrictive AD to decrease pain and/or risk for falling.)          [x] Neuromuscular Re-education (Review/Progress HEP and provide verbal/tactile cueing for activities related to improving balance, coordination, kinesthetic sense, posture, motor skill, proprioception.)         [x] Manual Therapy (Provide manual therapy to mobilize soft tissue/joints for the purpose of modulating pain, promoting relaxation, increasing ROM, reducing/eliminating soft tissue swelling/inflammation/tightness, improving soft tissue extensibility)   [] Dry Needling    [] Aquatic Therapy (Facilitate muscle relaxation and increases peripheral circulation; stimulates body awareness, balance, and trunk stability.) [x] Modalities (For modulating pain/tenderness/paresthesias, reducing swelling/inflammation/tightness, improving soft tissue extensibility, and/or to increase muscle tone/strength):     [x] Ultrasound  [x] Electrical Stimulation        [] Cervical Traction [x] Lumbar Traction       [x] Cold/hotpack [] Iontophoresis   Other:      [x]     IDN     []      Assessment:  Conditions Requiring Skilled Therapeutic Intervention  Body structures, Functions, Activity limitations: Decreased functional mobility , Decreased ADL status, Decreased ROM, Decreased strength, Decreased high-level IADLs, Decreased posture, Increased pain, Decreased balance, Decreased endurance  Assessment: 81 yo male pt presents with obvious pain throughout R LE with significantly compensated movement patterns with functional transfers and ambulation. He demonstrated decreased functional strength and ROM as well as decreased functional ambulation. Prior to onset of pain, pt was functioning I with all ADLs and IADLs with no R anterior thigh pain. Currently pt is functioning below his baseline level and is limited by severe pain. This pt would benefit from ongoing PT for therapeutic exercise and pain reducing modalities to enable him to restore normal movement and function as prior to onset of pain. He appears to be motivated to paticipate in therapy and demonstrates good rehab potential to meet established goals. Pt will be leaving to go out of town to visit children 12/23/20-1/7/21 for the holidays.   Treatment Diagnosis: R LE pain and weakness  Prognosis: Good  Decision Making: Low Complexity  REQUIRES PT FOLLOW UP: Yes    Goals:  Short term goals  Time Frame for Short term goals: 8 weeks  Short term goal 1: Improve R hip ROM to [facility] to enable normal functional mobility with ADLs ie donning/doffing shoes and pants  Short term goal 2: Improve ROM lumbar spine to [facility] for normal functional mobility Short term goal 3: Improve R hip strength by 50% to enable pt to I lift leg into car and perform sit to stand transfers with mild compensated movements  Short term goal 4: decrease pain to 5/10 with walking and 2/10 with transitional movements  Short term goal 5: demonstrate I HEP  Long term goals  Time Frame for Long term goals : 8 weeks  Long term goal 1: Improve Thirty second sit to stand from 3 to 10  Long term goal 2: Improve TUG score from 23 sec to 13 to decrease risk of falls  Long term goal 3: pt will resume I ambulation demonstrating good gt components throughout home and community with good functional speed  Long term goal 4: Decrease pain to 3/10 with walking community distances and 0-1 with transitional movements    Frequency/Duration:  # Days per week: [] 1 day # Weeks: [] 1 week [] 5 weeks     [x] 2 days   [] 2 weeks [] 6 weeks     [] 3 days   [] 3 weeks [] 7 weeks     [] 4 days   [] 4 weeks [x] 8 weeks    Rehab Potential: [] Excellent [x] Good [] Fair  [] Poor       Electronically signed by:  Nessa Sanz PT        If you have any questions or concerns, please don't hesitate to call.   Thank you for your referral.      Physician Signature:________________________________Date:__________________  By signing above, therapists plan is approved by physician    Jay Davis

## 2020-12-17 NOTE — PROGRESS NOTES
Physical Therapy  Initial Assessment  Date: 12/15/2020  Patient Name: Shona Patel  MRN: [id number]  : 1934     Treatment Diagnosis: R LE pain and weakness    Restrictions  Restrictions/Precautions  Restrictions/Precautions: (as tolerated)    Subjective   General  Chart Reviewed: Yes  Additional Pertinent Hx: PMHx: HTN,hypothyroid,HLD,afib,gout,mac degeneration, sciatica. Lumbar XR 2018 + DDD mild L1-3, mod L3-4,severe L4-5S1 and sciatica. Referral Date : 20  Diagnosis: M53.87  Follows Commands: Within Functional Limits  PT Visit Information  PT Insurance Information: Medicare  Progress Note Due Date: 21  Subjective  Subjective: Pt reports long hx of low back pain and issues with new onset of R anterior thigh pain which started 3 months ago. Pt describes pain as severe rated 8/10 with walking and 4/10 with sit to stand and bed mobility. Pain is worse with movement such as transitional movements and walking and pt reports no pain in sitting/supine. He denies falls in the past year or known injury/incident. He saw his PCP 3 months ago and was told to exercise with pt doing knee to chest stretches which hasnt helped the pain. He returned recently to the [facility] with PT ordered and no recent imaging noted. Pt reports being I with ADLs/IADLs with intermittent aching in his back but no R thigh pain prior to this onset ~3 months ago.   Pain Screening  Patient Currently in Pain: Yes  Pain Assessment  Pain Assessment: 0-10  Pain Level: 8  Pain Type: Chronic pain  Pain Location: Hip  Pain Orientation: Right  Pain Radiating Towards: anterior thigh  Pain Descriptors: Aching  Pain Frequency: Intermittent    Vision/Hearing  Vision  Vision: Within Functional Limits  Hearing  Hearing: Within functional limits    Orientation  Orientation  Overall Orientation Status: Within Normal Limits    Social/Functional History  Social/Functional History  Lives With: Alone  Type of Home: House Home Layout: One level(optional upstairs and basement)  Bathroom Shower/Tub: Walk-in shower  Bathroom Toilet: Standard  Bathroom Equipment: Built-in shower seat  Bathroom Accessibility: Accessible  ADL Assistance: Independent  Homemaking Assistance: Independent  Ambulation Assistance: Independent  Transfer Assistance: Independent  Active : Yes  Occupation: Retired  Type of occupation: retired   Additional Comments: has 2 children both living out of state    Objective     Observation/Palpation  Posture: Fair  Palpation: tenderness over L4-5 spinous processes, -ASIS/PSIS, - gr troch,-piriformis  Observation: significant antalgic limp with poor gt components and compensated movement with transitional movements  Body Mechanics: limited by pain and decreased ROM  Edema: mild pitting edema B legs    PROM RLE (degrees)  RLE General PROM: (supine) hip flex 108, hip ext -11 from neutral, abd 18, add 5 (pain), R knee -10- 120  PROM LLE (degrees)  LLE General PROM: (supine) hip flex 120,ext -10 from neutral, abd 40(w/overpressure),add 20, knee -  AROM RUE (degrees)  RUE AROM : WFL  AROM LUE (degrees)  LUE AROM : WFL  Spine  Lumbar: forward flex 18\" finger tip to floor, ext 0, R SB 1\" fingerti to suprapatellar border, R rotation 0(turns head only).  L SB 1.5\" to suprapatellar border, L rotation 0(turns head only)  Special Tests: - Slump Test, - SLR , R hip compression +,  no pain with R long leg distraction  Joint Mobility  Spine: mod/max limittions in lumbar segmental mobility    Strength RLE  Comment: hip flex 3-, abd 3, ext 2,quad 4+,HS 3,df/pf 3(all limited by pain)  Strength LLE  Strength LLE: WFL  Strength RUE  Strength RUE: WFL  Strength LUE  Strength LUE: WFL     Additional Measures  Flexibility: B HS -45(90/90), overall oor flexibility throughout B hips and low back with max limitations in functional mobility  Special Tests: TU sec, Thirty sec sit to stand: 3  Sensation Overall Sensation Status: WNL  Bed mobility  Rolling to Left: Minimal assistance  Rolling to Right: Minimal assistance  Supine to Sit: Minimal assistance  Sit to Supine: Minimal assistance  Transfers  Sit to Stand: Minimal Assistance(significantly compensated movements with twisting motion,decreased wt shift R and walks hands up thighs to come up to standing)  Stand to sit: Minimal Assistance       Ambulation  Ambulation?: Yes  Ambulation 1  Surface: level tile  Device: No Device  Assistance: Modified Independent  Quality of Gait: decreased step through gt sequence with antalgic limp R and poor functional speed  Distance: household distances with difficulty due to pain  Comments: unable to ambulate shorter community distances without assistance  Stairs/Curb  Stairs?: No(not assessed due to pain and optional in home)  Balance  Posture: Fair  Sitting - Static: Good  Sitting - Dynamic: Good  Standing - Static: Fair(decreased R wt shift/limited by pain)  Standing - Dynamic: Fair(no LOB with ambulation but balance limited by pain and decreased wt shift)  Exercises  Exercise 1: hamstring stretch  Exercise 2: QS/GS                      Assessment   Conditions Requiring Skilled Therapeutic Intervention  Body structures, Functions, Activity limitations: Decreased functional mobility ; Decreased ADL status; Decreased ROM; Decreased strength;Decreased high-level IADLs;Decreased posture; Increased pain;Decreased balance;Decreased endurance Assessment: 79 yo male pt presents with obvious pain throughout R LE with significantly compensated movement patterns with functional transfers and ambulation. He demonstrated decreased functional strength and ROM as well as decreased functional ambulation. Prior to onset of pain, pt was functioning I with all ADLs and IADLs with no R anterior thigh pain. Currently pt is functioning below his baseline level and is limited by severe pain. This pt would benefit from ongoing PT for therapeutic exercise and pain reducing modalities to enable him to restore normal movement and function as prior to onset of pain. He appears to be motivated to paticipate in therapy and demonstrates good rehab potential to meet established goals. Pt will be leaving to go out of town to visit children 12/23/20-1/7/21 for the holidays.   Treatment Diagnosis: R LE pain and weakness  Prognosis: Good  Decision Making: Low Complexity  REQUIRES PT FOLLOW UP: Yes  Activity Tolerance  Activity Tolerance: Patient limited by pain         Plan   Plan  Times per week: 2  Plan weeks: 8  Current Treatment Recommendations: Strengthening, ROM, Balance Training, Functional Mobility Training, Transfer Training, Stair training, Pain Management, Gait Training, Modalities, Manual Therapy - Soft Tissue Mobilization, Home Exercise Program  Plan Comment: pt agreeable to POC    G-Code       OutComes Score   (Modified Oswestry not filled out and will address with pt on next visit)                                               AM-PAC Score             Goals  Short term goals  Time Frame for Short term goals: 8 weeks  Short term goal 1: Improve R hip ROM to [facility] to enable normal functional mobility with ADLs ie donning/doffing shoes and pants  Short term goal 2: Improve ROM lumbar spine to [facility] for normal functional mobility Short term goal 3: Improve R hip strength by 50% to enable pt to I lift leg into car and perform sit to stand transfers with mild compensated movements  Short term goal 4: decrease pain to 5/10 with walking and 2/10 with transitional movements  Short term goal 5: demonstrate I HEP  Long term goals  Time Frame for Long term goals : 8 weeks  Long term goal 1: Improve Thirty second sit to stand from 3 to 10  Long term goal 2: Improve TUG score from 23 sec to 13 to decrease risk of falls  Long term goal 3: pt will resume I ambulation demonstrating good gt components throughout home and community with good functional speed  Long term goal 4: Decrease pain to 3/10 with walking community distances and 0-1 with transitional movements  Patient Goals   Patient goals : resume PLOF without pain       Therapy Time   Individual Concurrent Group Co-treatment   Time In 0929         Time Out 1016         Minutes [address], [place]

## 2020-12-17 NOTE — FLOWSHEET NOTE
Physical Therapy Daily Treatment Note  Date:  12/15/2020    Patient Name:  Roma July    :  1934  MRN: [id number]  Restrictions/Precautions:    Restrictions/Precautions  Restrictions/Precautions: (as tolerated)   Medical/Treatment Diagnosis Information:  · Diagnosis: M53.87  · Treatment Diagnosis: R LE pain and weakness  Insurance/Certification information:  PT Insurance Information: Medicare  Insurance Allowable Visits:    Physician Information:     MD Follow-up Visit:   Plan of care signed (Y/N):    Visit# / total visits:  /  Pain level: /10     Progress Note Due (10 visits or 30 days, whichever is less):    Recertification Note Due (End of POC or 90 days, whichever is less):      Subjective:    Subjective  Subjective: Pt reports long hx of low back pain and issues with new onset of R anterior thigh pain which started 3 months ago. Pt describes pain as severe rated 8/10 with walking and 4/10 with sit to stand and bed mobility. Pain is worse with movement such as transitional movements and walking and pt reports no pain in sitting/supine. He denies falls in the past year or known injury/incident. He saw his PCP 3 months ago and was told to exercise with pt doing knee to chest stretches which hasnt helped the pain. He returned recently to the [facility] with PT ordered and no recent imaging noted. Pt reports being I with ADLs/IADLs with intermittent aching in his back but no R thigh pain prior to this onset ~3 months ago.   Pain Screening  Patient Currently in Pain: Yes  Pain Assessment  Pain Assessment: 0-10  Pain Level: 8  Patient's Stated Pain Goal: No pain  Pain Type: Chronic pain  Pain Location: Hip  Pain Orientation: Right  Pain Radiating Towards: anterior thigh  Pain Descriptors: Aching  Pain Frequency: Intermittent     Objective:  Observation: Palpation: tenderness over L4-5 spinous processes, -ASIS/PSIS, - gr troch,-piriformis ? Observation: significant antalgic limp with poor gt components and compensated movement with transitional movements  ? Test measurements:    PROM RLE (degrees)  RLE General PROM: (supine) hip flex 108, hip ext -11 from neutral, abd 18, add 5 (pain), R knee -10- 120  PROM LLE (degrees)  LLE General PROM: (supine) hip flex 120,ext -10 from neutral, abd 40(w/overpressure),add 20, knee -  Lumbar: forward flex 18\" finger tip to floor, ext 0, R SB 1\" fingerti to suprapatellar border, R rotation 0(turns head only). L SB 1.5\" to suprapatellar border, L rotation 0(turns head only)    Strength RLE  Comment: hip flex 3-, abd 3, ext 2,quad 4+,HS 3,df/pf 3(all limited by pain)  Flexibility: B HS -45(90/90), overall oor flexibility throughout B hips and low back with max limitations in functional mobility  Special Tests: TU sec, Thirty sec sit to stand: 3    Exercises, Neuro Facilitation & Gait Training ([id number], [id number], [id number]): Activity Resistance/Repetitions Other comments   QS/GS     Hamstring stretch                                                                   Therapeutic Activities (35999):      Home Exercise Program:   Per list above    Manual Treatments (50735):   Add prn    Modalities: add prn     Timed Code Treatment Minutes:  eval x 15, TE x 32 min    Total Treatment Minutes:  47 min    Treatment/Activity Tolerance:  [] Patient tolerated treatment well [] Patient limited by fatigue  [x] Patient limited by pain  [] Patient limited by other medical complications  [] Other: Assessment: 79 yo male pt presents with obvious pain throughout R LE with significantly compensated movement patterns with functional transfers and ambulation. He demonstrated decreased functional strength and ROM as well as decreased functional ambulation. Prior to onset of pain, pt was functioning I with all ADLs and IADLs with no R anterior thigh pain. Currently pt is functioning below his baseline level and is limited by severe pain. This pt would benefit from ongoing PT for therapeutic exercise and pain reducing modalities to enable him to restore normal movement and function as prior to onset of pain. He appears to be motivated to paticipate in therapy and demonstrates good rehab potential to meet established goals. Pt will be leaving to go out of town to visit children 12/23/20-1/7/21 for the holidays.     Prognosis: [] Good [] Fair  [] Poor    Patient Requires Follow-up: [x] Yes  [] No    Goals:  Short term goals  Time Frame for Short term goals: 8 weeks  Short term goal 1: Improve R hip ROM to [facility] to enable normal functional mobility with ADLs ie donning/doffing shoes and pants  Short term goal 2: Improve ROM lumbar spine to [facility] for normal functional mobility  Short term goal 3: Improve R hip strength by 50% to enable pt to I lift leg into car and perform sit to stand transfers with mild compensated movements  Short term goal 4: decrease pain to 5/10 with walking and 2/10 with transitional movements  Short term goal 5: demonstrate I HEP  Long term goals  Time Frame for Long term goals : 8 weeks  Long term goal 1: Improve Thirty second sit to stand from 3 to 10  Long term goal 2: Improve TUG score from 23 sec to 13 to decrease risk of falls  Long term goal 3: pt will resume I ambulation demonstrating good gt components throughout home and community with good functional speed  Long term goal 4: Decrease pain to 3/10 with walking community distances and 0-1 with transitional movements    Plan:

## 2020-12-21 ENCOUNTER — HOSPITAL ENCOUNTER (OUTPATIENT)
Dept: ULTRASOUND IMAGING | Age: 85
Discharge: HOME | End: 2020-12-21
Payer: MEDICARE

## 2020-12-21 PROCEDURE — 76770 US EXAM ABDO BACK WALL COMP: CPT

## 2021-01-06 ENCOUNTER — HOSPITAL ENCOUNTER (OUTPATIENT)
Dept: PHYSICAL THERAPY | Age: 86
Setting detail: THERAPIES SERIES
Discharge: HOME | End: 2021-01-06
Payer: MEDICARE

## 2021-01-06 PROCEDURE — 97140 MANUAL THERAPY 1/> REGIONS: CPT

## 2021-01-06 PROCEDURE — G0283 ELEC STIM OTHER THAN WOUND: HCPCS

## 2021-01-06 PROCEDURE — 97110 THERAPEUTIC EXERCISES: CPT

## 2021-01-06 NOTE — FLOWSHEET NOTE
Physical Therapy Daily Treatment Note  Date:  21    Patient Name:  Murtaza Moore    :  1934  MRN: [id number]  Restrictions/Precautions:    Restrictions/Precautions  Restrictions/Precautions: (as tolerated)   Medical/Treatment Diagnosis Information:  · Diagnosis: M53.87  · Treatment Diagnosis: R LE pain and weakness  Insurance/Certification information:     Insurance Allowable Visits:    Physician Information:     MD Follow-up Visit:   Plan of care signed (Y/N):    Visit# / total visits:   Pain level: 8/10 activity    Progress Note Due (10 visits or 30 days, whichever is less):    Recertification Note Due (End of POC or 90 days, whichever is less):      Subjective: pt continues with 8/10 pain in R anterior thigh, worse with movement and improves with rest. C/o R side back ache 4/10. No changes since last seen 2 weeks ago prior to holiday      Pain Screening  Patient Currently in Pain: Yes  Pain Assessment  Pain Assessment: 0-10  Pain Level: 8  Patient's Stated Pain Goal: No pain  Pain Type: Chronic pain  Pain Location: Hip  Pain Orientation: Right  Pain Radiating Towards: anterior thigh  Pain Descriptors: Aching  Pain Frequency: Intermittent     Objective:  Observation: Palpation: tenderness over L4-5 spinous processes, and R quadratus area  ? Observation: significant antalgic limp with poor gt components and compensated movement with transitional movements  ? Test measurements:    PROM RLE (degrees)  RLE General PROM: (supine) hip flex 108, hip ext -11 from neutral, abd 18, add 5 (pain), R knee -10- 120  PROM LLE (degrees)  LLE General PROM: (supine) hip flex 120,ext -10 from neutral, abd 40(w/overpressure),add 20, knee -  Lumbar: forward flex 18\" finger tip to floor, ext 0, R SB 1\" fingerti to suprapatellar border, R rotation 0(turns head only).  L SB 1.5\" to suprapatellar border, L rotation 0(turns head only)    Strength RLE Short term goal 3: Improve R hip strength by 50% to enable pt to I lift leg into car and perform sit to stand transfers with mild compensated movements  Short term goal 4: decrease pain to 5/10 with walking and 2/10 with transitional movements  Short term goal 5: demonstrate I HEP  Long term goals  Time Frame for Long term goals : 8 weeks  Long term goal 1: Improve Thirty second sit to stand from 3 to 10  Long term goal 2: Improve TUG score from 23 sec to 13 to decrease risk of falls  Long term goal 3: pt will resume I ambulation demonstrating good gt components throughout home and community with good functional speed  Long term goal 4: Decrease pain to 3/10 with walking community distances and 0-1 with transitional movements    Plan:   Visits per week:  2  # of weeks:  8    [] Continue per plan of care [] Alter current plan (see comments)  [x] Plan of care initiated [] Hold pending MD visit [] Discharge    Plan for Next Session:  Progress as tolerated    Electronically signed by:  Reynold Overton

## 2021-01-08 ENCOUNTER — HOSPITAL ENCOUNTER (OUTPATIENT)
Dept: PHYSICAL THERAPY | Age: 86
Setting detail: THERAPIES SERIES
Discharge: HOME | End: 2021-01-08
Payer: MEDICARE

## 2021-01-08 PROCEDURE — 97140 MANUAL THERAPY 1/> REGIONS: CPT

## 2021-01-08 PROCEDURE — 97110 THERAPEUTIC EXERCISES: CPT

## 2021-01-08 PROCEDURE — G0283 ELEC STIM OTHER THAN WOUND: HCPCS

## 2021-01-08 NOTE — FLOWSHEET NOTE
Flexibility: B HS -45(90/90), overall oor flexibility throughout B hips and low back with max limitations in functional mobility  Special Tests: TU sec, Thirty sec sit to stand: 3    Exercises, Neuro Facilitation & Gait Training (03819, [id number], [id number]): Activity Resistance/Repetitions Other comments   TAs w/ GS 10 Cues for pelvic movement   TAs w/add sets 10 Cues for pelvic movement        Prone prop>press up 10 props Unable to get press up   Seated quadratus stretch 3 min    Prone rectus stretch passive    PA/rotation mobs lumbar,STM quadratus and paraspinals passive         Cat/camel>prayer stretch 10    bridges 10                          Therapeutic Activities (07993):      Home Exercise Program:   Instruction in B rectus stretch for HEP off end of bed, added cat/camel and bridges    Manual Treatments (97425):  PA's/rotational mobs lumbar spine, STM quadratus/paraspinals R, passiv e hamstring/hip flexor stretch     Modalities: INF/ice x 15 R lumbar/quadratus    Timed Code Treatment Minutes:  MT x 17, TE x 18    Total Treatment Minutes:  50 min    Treatment/Activity Tolerance:  [x] Patient tolerated treatment well [] Patient limited by fatigue  [] Patient limited by pain  [] Patient limited by other medical complications  [] Other:     Assessment: pt unable to get prone press up with moderate decrease in segmental mobility in lumbar spine, slight improvement following PA mobs but still tight with harder end feel. Mild tenderness palpated R quadratus and significant stretch felt with side bending quadratus stretch. Tolerated new exercises without pain but max difficulty with inability to get pelvic tilts and continued reinforcement needed,.verbalized understanding of new exercises with written instructions provided, significant improvement with functional speed with ambulation and 50% improved with antalgic limp.        Prognosis: [x] Good [] Fair  [] Poor    Patient Requires Follow-up: [x] Yes  [] No Goals:  Short term goals  Time Frame for Short term goals: 8 weeks  Short term goal 1: Improve R hip ROM to [facility] to enable normal functional mobility with ADLs ie donning/doffing shoes and pants  Short term goal 2: Improve ROM lumbar spine to [facility] for normal functional mobility  Short term goal 3: Improve R hip strength by 50% to enable pt to I lift leg into car and perform sit to stand transfers with mild compensated movements  Short term goal 4: decrease pain to 5/10 with walking and 2/10 with transitional movements  Short term goal 5: demonstrate I HEP  Long term goals  Time Frame for Long term goals : 8 weeks  Long term goal 1: Improve Thirty second sit to stand from 3 to 10  Long term goal 2: Improve TUG score from 23 sec to 13 to decrease risk of falls  Long term goal 3: pt will resume I ambulation demonstrating good gt components throughout home and community with good functional speed  Long term goal 4: Decrease pain to 3/10 with walking community distances and 0-1 with transitional movements    Plan:   Visits per week:  2  # of weeks:  8    [] Continue per plan of care [] Alter current plan (see comments)  [x] Plan of care initiated [] Hold pending MD visit [] Discharge    Plan for Next Session:  Progress as tolerated    Electronically signed by:  Morenita Madrigal

## 2021-01-11 ENCOUNTER — HOSPITAL ENCOUNTER (OUTPATIENT)
Dept: PHYSICAL THERAPY | Age: 86
Setting detail: THERAPIES SERIES
Discharge: HOME | End: 2021-01-11
Payer: MEDICARE

## 2021-01-11 PROCEDURE — 97110 THERAPEUTIC EXERCISES: CPT

## 2021-01-11 PROCEDURE — G0283 ELEC STIM OTHER THAN WOUND: HCPCS

## 2021-01-11 PROCEDURE — 97140 MANUAL THERAPY 1/> REGIONS: CPT

## 2021-01-11 NOTE — FLOWSHEET NOTE
Physical Therapy Daily Treatment Note  Date: 2021    Patient Name:  Maureen Ugarte    :  1934  MRN: [id number]  Restrictions/Precautions:    Restrictions/Precautions  Restrictions/Precautions: (as tolerated)   Medical/Treatment Diagnosis Information:  · Diagnosis: M53.87  · Treatment Diagnosis: R LE pain and weakness  Insurance/Certification information:     Insurance Allowable Visits:    Physician Information:     MD Follow-up Visit:   Plan of care signed (Y/N):    Visit# / total visits:   Pain level: 7/10 activity    Progress Note Due (10 visits or 30 days, whichever is less):    Recertification Note Due (End of POC or 90 days, whichever is less):      Subjective:  Reports getting relief for almost 24 hrs following therapy, continues to improve w/ back pain decreased slightly to 7/10 and R anterior thigh pain decreased to 3/10  \    Pain Screening  Patient Currently in Pain: Yes  Pain Assessment  Pain Assessment: 0-10  Pain Level: 7  Patient's Stated Pain Goal: No pain  Pain Type: Chronic pain  Pain Location: Hip  Pain Orientation: Right  Pain Radiating Towards: anterior thigh  Pain Descriptors: Aching  Pain Frequency: Intermittent     Objective:  Observation: Palpation: tenderness over L4-5 spinous processes, and R quadratus area  ? Observation:  antalgic limp  and compensated movement with transitional movements  ? Test measurements:    PROM RLE (degrees)  RLE General PROM: (supine) hip flex 108, hip ext -11 from neutral, abd 18, add 5 (pain), R knee -10- 120  PROM LLE (degrees)  LLE General PROM: (supine) hip flex 120,ext -10 from neutral, abd 40(w/overpressure),add 20, knee -  Lumbar: forward flex 18\" finger tip to floor, ext 0, R SB 1\" fingerti to suprapatellar border, R rotation 0(turns head only).  L SB 1.5\" to suprapatellar border, L rotation 0(turns head only)    Strength RLE  Comment: hip flex 3-, abd 3, ext 2,quad 4+,HS 3,df/pf 3(all limited by pain) Flexibility: B HS -45(90/90), overall oor flexibility throughout B hips and low back with max limitations in functional mobility  Special Tests: TU sec, Thirty sec sit to stand: 3    Exercises, Neuro Facilitation & Gait Training (08937, [id number], [id number]): Activity Resistance/Repetitions Other comments   TAs w/ GS 10 Cues for pelvic movement   TAs w/add sets 10 Cues for pelvic movement        Prone prop>press up 10 props Unable to get press up    3 min    Prone rectus stretch passive    PA/rotation mobs lumbar,STM quadratus and paraspinals passive         Cat/camel>prayer stretch 10    bridges 10    Manual lumbar traction 1 min x 3 trials                     Therapeutic Activities (69073):      Home Exercise Program:   Added bridges    Manual Treatments (78651): manual lumbar traction with belt 1 min holds x 3, STM quadratus/paraspinals R, passive hamstring/hip flexor stretch     Modalities: INF/ice x 15 R lumbar/quadratus    Timed Code Treatment Minutes:  MT x 16, TE x 20    Total Treatment Minutes:  51 min    Treatment/Activity Tolerance:  [x] Patient tolerated treatment well [] Patient limited by fatigue  [] Patient limited by pain  [] Patient limited by other medical complications  [] Other:     Assessment:  palpable tenderness over R quadratus is decreasing with each visit and pt  demonstrated improved lumbar rotation >/= 10 deg B , demonstrates good understanding of HEP but continues with difficulty with isolating pelvic tilts. Overall steady progress being made with functional mobility and pt no longer walking hands up thighs to extend with sit to stand transfers.       Prognosis: [x] Good [] Fair  [] Poor    Patient Requires Follow-up: [x] Yes  [] No    Goals:  Short term goals  Time Frame for Short term goals: 8 weeks  Short term goal 1: Improve R hip ROM to [facility] to enable normal functional mobility with ADLs ie donning/doffing shoes and pants

## 2021-01-13 ENCOUNTER — HOSPITAL ENCOUNTER (OUTPATIENT)
Dept: PHYSICAL THERAPY | Age: 86
Setting detail: THERAPIES SERIES
Discharge: HOME | End: 2021-01-13
Payer: MEDICARE

## 2021-01-13 PROCEDURE — 97110 THERAPEUTIC EXERCISES: CPT

## 2021-01-13 PROCEDURE — G0283 ELEC STIM OTHER THAN WOUND: HCPCS

## 2021-01-13 PROCEDURE — 97140 MANUAL THERAPY 1/> REGIONS: CPT

## 2021-01-13 NOTE — FLOWSHEET NOTE
Physical Therapy Daily Treatment Note  Date: 2021    Patient Name:  Lacho Acevedo    :  1934  MRN: [id number]  Restrictions/Precautions:    Restrictions/Precautions  Restrictions/Precautions: (as tolerated)   Medical/Treatment Diagnosis Information:  · Diagnosis: M53.87  · Treatment Diagnosis: R LE pain and weakness  Insurance/Certification information:     Insurance Allowable Visits:    Physician Information:     MD Follow-up Visit:   Plan of care signed (Y/N):    Visit# / total visits:   Pain level: 8/10 activity    Progress Note Due (10 visits or 30 days, whichever is less):    Recertification Note Due (End of POC or 90 days, whichever is less):      Subjective:  Feels good for a while after therapy but pain comes back, reports increased pain today 8/10 in L low back/upper buttock with walking, denies pain at rest. Pain in front of thigh has improved a lot but still comes and goes intermittently with lifting leg when walking  \    Pain Screening  Patient Currently in Pain: Yes  Pain Assessment  Pain Assessment: 0-10  Pain Level: 7  Patient's Stated Pain Goal: No pain  Pain Type: Chronic pain  Pain Location: Hip  Pain Orientation: Right  Pain Radiating Towards: anterior thigh  Pain Descriptors: Aching  Pain Frequency: Intermittent     Objective:  Observation: Palpation: tenderness over L4-5 spinous processes, and R quadratus area  ? Observation:  antalgic limp  and compensated movement with transitional movements  ? Test measurements:    PROM RLE (degrees)  RLE General PROM: (supine) hip flex 108, hip ext -11 from neutral, abd 18, add 5 (pain), R knee -10- 120  PROM LLE (degrees)  LLE General PROM: (supine) hip flex 120,ext -10 from neutral, abd 40(w/overpressure),add 20, knee -  Lumbar: forward flex 18\" finger tip to floor, ext 0, R SB 1\" fingertip to suprapatellar border, R rotation 0(turns head only).  L SB 1.5\" to suprapatellar border, L rotation 0(turns head only) 21: lumbar mobility forward flexion 10\" finger tips to floor, LSB 25,RSB 30, ext is minimal, rotation R 20, L rot 10    Strength RLE  Comment: hip flex 3-, abd 3, ext 2,quad 4+,HS 3,df/pf 3(all limited by pain)  Flexibility: B HS -45(90/90), overall oor flexibility throughout B hips and low back with max limitations in functional mobility  Special Tests: TU sec, Thirty sec sit to stand: 3    21: Thirty sec sit to stand: 9, hip flex 3/5, ext 3-    Exercises, Neuro Facilitation & Gait Training (83615, [id number], [id number]): Activity Resistance/Repetitions Other comments   TAs w/ GS 10 Cues for pelvic movement   TAs w/add sets 10 Cues for pelvic movement   R seated quadratus stretch 1 min     Single knee to chest 30 sec x 3          passive              SLR 10    bridges 10    Manual lumbar traction/long leg pulls 1 min x 3 trials         Heel/toe raises,marches,abuction in standing 5 reps Tactile cues to prevent compensated movements          Therapeutic Activities (46854):   Training forward wt shift/R wt shift and components with sit to stand, Thirty sec sit to stand: 9    Home Exercise Program:   Initiated standing strengthening, added knee to chest and SLR    Manual Treatments (04105): manual lumbar traction with belt 1 min holds x 3,long leg pulls R LE, passive hamstring/hip flexor stretch, STM R paraspinals/quadratus     Modalities: INF/heat x 15 R lumbar/quadratus    Timed Code Treatment Minutes:  MT x 18, TE x 26    Total Treatment Minutes:  59 min    Treatment/Activity Tolerance:  [x] Patient tolerated treatment well [] Patient limited by fatigue  [] Patient limited by pain  [] Patient limited by other medical complications  [] Other: Assessment: Pt demonstrating improved lumbar mobility all directions but continues with moderate limitation in extension. Strength improving with ability to do I bridges and SLR today. Continues with antaglic limp but is 87% better overall with walking and functional mobility (Thirty sec sit to stand improved from 3 initially to 9). Pt is showing steady progress with ongoing therapy needed to restore PLOF with pain.      Prognosis: [x] Good [] Fair  [] Poor    Patient Requires Follow-up: [x] Yes  [] No    Goals:  Short term goals  Time Frame for Short term goals: 8 weeks  Short term goal 1: Improve R hip ROM to [facility] to enable normal functional mobility with ADLs ie donning/doffing shoes and pants- ongoing  Short term goal 2: Improve ROM lumbar spine to [facility] for normal functional mobility- ongoing  Short term goal 3: Improve R hip strength by 50% to enable pt to I lift leg into car and perform sit to stand transfers with mild compensated movements- ongoing  Short term goal 4: decrease pain to 5/10 with walking and 2/10 with transitional movements- ongoing  Short term goal 5: demonstrate I HEP- ongoing  Long term goals  Time Frame for Long term goals : 8 weeks  Long term goal 1: Improve Thirty second sit to stand from 3 to 10- ongoing  Long term goal 2: Improve TUG score from 23 sec to 13 to decrease risk of falls- ongoing  Long term goal 3: pt will resume I ambulation demonstrating good gt components throughout home and community with good functional speed- ongoing  Long term goal 4: Decrease pain to 3/10 with walking community distances and 0-1 with transitional movements- ongoing    Plan:   Visits per week:  2  # of weeks:  8    [] Continue per plan of care [] Alter current plan (see comments)  [x] Plan of care initiated [] Hold pending MD visit [] Discharge    Plan for Next Session:  Progress as tolerated    Electronically signed by:  Ofelia Graves

## 2021-01-14 NOTE — PROGRESS NOTES
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  Treatment Diagnosis:  Low back pain      Physical Therapy Progress Note    Time Period for Report:  12/15/20-1/15/21    Total Visits to date:   5 Cancels/No-shows to date:  0    Plan of Care/Treatment to date:  [x] Therapeutic Exercise (Review/Progress HEP and provide verbal/tactile cueing for activities related to strengthening, flexibility,  endurance, ROM.)       [x] Therapeutic Activity (Provide verbal/tactile cueing for dynamic activities to promote functional tasks.)          [x] Gait Training (Provide verbal/tactile/visual cueing for facilitation of normalized gait pattern without or with the least restrictive AD to decrease pain and/or risk for falling.)          [x] Neuromuscular Re-education (Review/Progress HEP and provide verbal/tactile cueing for activities related to improving balance, coordination, kinesthetic sense, posture, motor skill, proprioception.)         [x] Manual Therapy (Provide manual therapy to mobilize soft tissue/joints for the purpose of modulating pain, promoting relaxation, increasing ROM, reducing/eliminating soft tissue swelling/inflammation/tightness, improving soft tissue extensibility)                [x] Modalities (For modulating pain/tenderness/paresthesias, reducing swelling/inflammation/tightness, improving soft tissue extensibility, and/or to increase muscle tone/strength):     [] Ultrasound  [] Electrical Stimulation        [] Cervical Traction [] Lumbar Traction    ?    [] Cold/hotpack [] Iontophoresis   Other:      []          []     Significant Findings At Last Visit/Comments:    · Improved functional mobility with sit to stand transfers improved from 3 to 9 in 30 sec  · Antalgic limp improved 50% with functional ambulation  · Lumbar ROM flexion WFL,ext 5,LSB 25,RSB 30 and rotation 10-15  · Strength improving with pt able to do I SLR and bridge unlike during initial eval  · Pt demonstrating I HEP with good compliance       Progress towards goals:    Short term goals  Time Frame for Short term goals: 8 weeks  Short term goal 1: Improve R hip ROM to [facility] to enable normal functional mobility with ADLs ie donning/doffing shoes and pants- ongoing  Short term goal 2: Improve ROM lumbar spine to [facility] for normal functional mobility- ongoing  Short term goal 3: Improve R hip strength by 50% to enable pt to I lift leg into car and perform sit to stand transfers with mild compensated movements- ongoing  Short term goal 4: decrease pain to 5/10 with walking and 2/10 with transitional movements- ongoing  Short term goal 5: demonstrate I HEP- ongoing  Long term goals  Time Frame for Long term goals : 8 weeks  Long term goal 1: Improve Thirty second sit to stand from 3 to 10- ongoing  Long term goal 2: Improve TUG score from 23 sec to 13 to decrease risk of falls- ongoing  Long term goal 3: pt will resume I ambulation demonstrating good gt components throughout home and community with good functional speed- ongoing  Long term goal 4: Decrease pain to 3/10 with walking community distances and 0-1 with transitional movements- ongoing       Frequency/Duration: (5 out of 16 visits ordered per original POC)  # Days per week: [] 1 day # Weeks: [] 1 week [] 4 weeks      [x] 2 days?    [] 2 weeks [] 5 weeks      [] 3 days   [] 3 weeks [x] 8 weeks     Rehab Potential: [] Excellent [x] Good [] Fair  [] Poor     Goal Status:  [] Achieved [x] Partially Achieved  [] Not Achieved     Patient Status: [x] Continue per initial plan of Care     [] Patient now discharged     [] Additional visits requested, Please re-certify for additional visits:      Requested frequency/duration:  X/week for weeks    Electronically signed by:  Hoda Ortiz Mónica Mejía PT    If you have any questions or concerns, please don't hesitate to call.   Thank you for your referral.    Physician Signature:________________________________Date:__________________  By signing above, therapists plan is approved by physician

## 2021-01-18 ENCOUNTER — HOSPITAL ENCOUNTER (OUTPATIENT)
Dept: PHYSICAL THERAPY | Age: 86
Setting detail: THERAPIES SERIES
Discharge: HOME | End: 2021-01-18
Payer: MEDICARE

## 2021-01-18 NOTE — CARE COORDINATION
Physical Therapy  Cancellation/No-show Note  Patient Name:  Emelda Dubin  :  1934   Date:  2021  MRN: [id number]  Cancelled visits to date: 0  No-shows to date: 0    For today's appointment patient:  [x]  Cancelled  []  Rescheduled appointment  []  No-show     Reason given by patient:  [x]  Patient ill  []  Conflicting appointment  []  No transportation    []  Conflict with work  []  No reason given  []  Other:     Comments:      Electronically signed by:  Meli Portillo PT

## 2021-01-20 ENCOUNTER — HOSPITAL ENCOUNTER (OUTPATIENT)
Dept: PHYSICAL THERAPY | Age: 86
Setting detail: THERAPIES SERIES
Discharge: HOME | End: 2021-01-20
Payer: MEDICARE

## 2021-01-20 NOTE — CARE COORDINATION
Physical Therapy  Cancellation/No-show Note  Patient Name:  Lisette Aden  :  1934   Date:  2021  MRN: [id number]  Cancelled visits to date: 0  No-shows to date: 0    For today's appointment patient:  [x]  Cancelled  []  Rescheduled appointment  []  No-show     Reason given by patient:  [x]  Patient ill  []  Conflicting appointment  []  No transportation    []  Conflict with work  []  No reason given  []  Other:     Comments:  Awaiting results of covid test    Electronically signed by:  Chelsea Langford PT

## 2021-01-21 ENCOUNTER — IMMUNIZATION (OUTPATIENT)
Dept: PRIMARY CARE CLINIC | Age: 86
End: 2021-01-21
Payer: MEDICARE

## 2021-01-21 PROCEDURE — 91300 COVID-19, PFIZER VACCINE 30MCG/0.3ML DOSE: CPT | Performed by: FAMILY MEDICINE

## 2021-01-21 PROCEDURE — 0001A COVID-19, PFIZER VACCINE 30MCG/0.3ML DOSE: CPT | Performed by: FAMILY MEDICINE

## 2021-01-25 ENCOUNTER — HOSPITAL ENCOUNTER (OUTPATIENT)
Dept: PHYSICAL THERAPY | Age: 86
Setting detail: THERAPIES SERIES
Discharge: HOME | End: 2021-01-25
Payer: MEDICARE

## 2021-01-25 PROCEDURE — 97012 MECHANICAL TRACTION THERAPY: CPT

## 2021-01-25 PROCEDURE — 97140 MANUAL THERAPY 1/> REGIONS: CPT

## 2021-01-25 PROCEDURE — 97110 THERAPEUTIC EXERCISES: CPT

## 2021-01-25 NOTE — FLOWSHEET NOTE
Physical Therapy Daily Treatment Note  Date: 2021    Patient Name:  Renate Calhoun    :  1934  MRN: [id number]  Restrictions/Precautions:    Restrictions/Precautions  Restrictions/Precautions: (as tolerated)   Medical/Treatment Diagnosis Information:  · Diagnosis: M53.87  · Treatment Diagnosis: R LE pain and weakness  Insurance/Certification information:     Insurance Allowable Visits:    Physician Information:     MD Follow-up Visit:   Plan of care signed (Y/N):    Visit# / total visits:   Pain level: 5/10 activity    Progress Note Due (10 visits or 30 days, whichever is less):    Recertification Note Due (End of POC or 90 days, whichever is less):      Subjective: continues to report overall improvement in pain , R leg pain improves with stretching  and is gone with sitting and increases with walking  \    Pain Screening  Patient Currently in Pain: Yes  Pain Assessment  Pain Assessment: 0-10  Pain Level: 7  Patient's Stated Pain Goal: No pain  Pain Type: Chronic pain  Pain Location: Hip  Pain Orientation: Right  Pain Radiating Towards: anterior thigh  Pain Descriptors: Aching  Pain Frequency: Intermittent     Objective:  Observation: Palpation: tenderness over L4-5 spinous processes, and R quadratus area  ? Observation:  antalgic limp  and compensated movement with transitional movements  ? Test measurements:    PROM RLE (degrees)  RLE General PROM: (supine) hip flex 108, hip ext -11 from neutral, abd 18, add 5 (pain), R knee -10- 120  PROM LLE (degrees)  LLE General PROM: (supine) hip flex 120,ext -10 from neutral, abd 40(w/overpressure),add 20, knee -  Lumbar: forward flex 18\" finger tip to floor, ext 0, R SB 1\" fingertip to suprapatellar border, R rotation 0(turns head only).  L SB 1.5\" to suprapatellar border, L rotation 0(turns head only)    21: lumbar mobility forward flexion 10\" finger tips to floor, LSB 25,RSB 30, ext is minimal, rotation R 20, L rot 10    Strength RLE Comment: hip flex 3-, abd 3, ext 2,quad 4+,HS 3,df/pf 3(all limited by pain)  Flexibility: B HS -45(90/90), overall oor flexibility throughout B hips and low back with max limitations in functional mobility  Special Tests: TU sec, Thirty sec sit to stand: 3    21: Thirty sec sit to stand: 9, hip flex 3/5, ext 3-    Exercises, Neuro Facilitation & Gait Training (37993, [id number], [id number]): Activity Resistance/Repetitions Other comments   TAs w/ GS 10 Cues for pelvic movement   TAs w/add sets 10 Cues for pelvic movement   R seated quadratus stretch 1 min     Single knee to chest 30 sec x 3    Piriformis stretch passive    Prone rectus stretch passive    PA/rotation mobs lumbar,STM quadratus and paraspinals passive    Prone props1 min x 3    10 HEP   bridges 10 HEP    1 min x 3 trials         Heel/toe raises,marches,abuction in standing 5 reps Tactile cues to prevent compensated movements          Therapeutic Activities (22784):      Home Exercise Program:   No changes made this date    Manual Treatments (00182):  passive hamstring/hip flexor stretch, Pas/rotations Lspine    Modalities: mechanical lumbar traction (204# max25%,min5#,6 step progression),  INF/heat x 15 R lumbar/quadratus    Timed Code Treatment Minutes:  Traction x 15, MT x 14, TE x 21    Total Treatment Minutes:  50 min    Treatment/Activity Tolerance:  [x] Patient tolerated treatment well [] Patient limited by fatigue  [] Patient limited by pain  [] Patient limited by other medical complications  [] Other:     Assessment: Pt ambulating in/out of dept with minimal to no antalgic limp. Limited in lumbar extension with prone props, unable to press up at all, improvement with segmental mobility in L spine with grade I/II mobs. Tolerated mechanical traction without pain or issue.  Pt is showing steady progress with improved mobiolity and decreasing pain with ongoing therapy in order to reach max potential.    Prognosis: [x] Good [] Fair  [] Poor

## 2021-01-27 ENCOUNTER — HOSPITAL ENCOUNTER (OUTPATIENT)
Dept: PHYSICAL THERAPY | Age: 86
Setting detail: THERAPIES SERIES
Discharge: HOME | End: 2021-01-27
Payer: MEDICARE

## 2021-01-27 PROCEDURE — G0283 ELEC STIM OTHER THAN WOUND: HCPCS

## 2021-01-27 PROCEDURE — 97012 MECHANICAL TRACTION THERAPY: CPT

## 2021-01-27 PROCEDURE — 97110 THERAPEUTIC EXERCISES: CPT
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
## 2021-02-01 ENCOUNTER — HOSPITAL ENCOUNTER (OUTPATIENT)
Dept: PHYSICAL THERAPY | Age: 86
Setting detail: THERAPIES SERIES
Discharge: HOME | End: 2021-02-01
Payer: MEDICARE

## 2021-02-01 PROCEDURE — 97140 MANUAL THERAPY 1/> REGIONS: CPT

## 2021-02-01 PROCEDURE — 97110 THERAPEUTIC EXERCISES: CPT

## 2021-02-01 NOTE — FLOWSHEET NOTE
Comment: hip flex 3-, abd 3, ext 2,quad 4+,HS 3,df/pf 3(all limited by pain)  Flexibility: B HS -45(90/90), overall oor flexibility throughout B hips and low back with max limitations in functional mobility  Special Tests: TU sec, Thirty sec sit to stand: 3    21: Thirty sec sit to stand: 9, hip flex 3/5, ext 3-    Exercises, Neuro Facilitation & Gait Training (25616, [id number], [id number]): Activity Resistance/Repetitions Other comments   Prone hip IR 30 sec holds     10 Cues for pelvic movement   R seated quadratus stretch 1 min     ITB stretches 30 sec x 2 ea    Trunk rotations 30 sec x 2 ea    Piriformis stretch passive    Prone rectus stretc passive    passive    Prone on elbows1 min x 3    SLR10 HEP   bridges 10 HEP    1 min x 3 trials                     Therapeutic Activities (80353):  TU sec    Home Exercise Program:  Instruction/reinforcement on IR stretches,pirofrmis stretch and hip flexor stretch in prone with belt    Manual Treatments (82130):  Passive stretching B hips and R psoas release    Modalities:     Timed Code Treatment Minutes:  MT x 26, TE x  18    Total Treatment Minutes:  44 min    Treatment/Activity Tolerance:  [x] Patient tolerated treatment well [] Patient limited by fatigue  [] Patient limited by pain  [] Patient limited by other medical complications  [] Other:     Assessment:Tolerated passive stretching with improved mobility and decreased pain. Dc traction due to no referred pain present. Pt pain reproduced with lifting R LE to take a step. Hip IR significantly tighter on R than L and palpable tenderness in Psoas. Able to do SLR x 10 following passive stretching and Psoas release without pain and demonstrated improved gait pattern with minimal antalgic limp following visit. Pt will continue to benefit from ongoing PT to progress strength and ROM to restore normal pain free mobility and function.     Prognosis: [x] Good [] Fair  [] Poor

## 2021-02-03 ENCOUNTER — HOSPITAL ENCOUNTER (OUTPATIENT)
Dept: PHYSICAL THERAPY | Age: 86
Setting detail: THERAPIES SERIES
Discharge: HOME | End: 2021-02-03
Payer: MEDICARE

## 2021-02-03 PROCEDURE — G0283 ELEC STIM OTHER THAN WOUND: HCPCS

## 2021-02-03 PROCEDURE — 97110 THERAPEUTIC EXERCISES: CPT

## 2021-02-03 NOTE — FLOWSHEET NOTE
Physical Therapy Daily Treatment Note  Date: 2/3/2021    Patient Name:  Lew Grimes    :  1934  MRN: [id number]  Restrictions/Precautions:    Restrictions/Precautions  Restrictions/Precautions: (as tolerated)   Medical/Treatment Diagnosis Information:  · Diagnosis: M53.87  · Treatment Diagnosis: R LE pain and weakness  Insurance/Certification information:     Insurance Allowable Visits:    Physician Information:     MD Follow-up Visit:   Plan of care signed (Y/N):    Visit# / total visits:   Pain level: 5/10 hip/back w/ walking    Progress Note Due (10 visits or 30 days, whichever is less):  37  Recertification Note Due (End of POC or 90 days, whichever is less):      Subjective: Increased soreness following last visit, c/o 5/10 pain in R low back and 3-4/10 in R ant thigh at time of visit. Reports B knee arthritis and stated \"I think I limp because of my knee\"  Pain Screening  Patient Currently in Pain: Yes  Pain Assessment  Pain Assessment: 0-10  Pain Level: 7  Patient's Stated Pain Goal: No pain  Pain Type: Chronic pain  Pain Location: Hip  Pain Orientation: Right  Pain Radiating Towards: anterior thigh  Pain Descriptors: Aching  Pain Frequency: Intermittent     Objective:  Observation: Palpation: tenderness over L4-5 spinous processes, and R quadratus area  ? Observation:  antalgic limp  and compensated movement with transitional movements  ? Test measurements:    PROM RLE (degrees)  RLE General PROM: (supine) hip flex 108, hip ext -11 from neutral, abd 18, add 5 (pain), R knee -10- 120  PROM LLE (degrees)  LLE General PROM: (supine) hip flex 120,ext -10 from neutral, abd 40(w/overpressure),add 20, knee -  Lumbar: forward flex 18\" finger tip to floor, ext 0, R SB 1\" fingertip to suprapatellar border, R rotation 0(turns head only).  L SB 1.5\" to suprapatellar border, L rotation 0(turns head only) Short term goal 1: Improve R hip ROM to [facility] to enable normal functional mobility with ADLs ie donning/doffing shoes and pants- ongoing  Short term goal 2: Improve ROM lumbar spine to [facility] for normal functional mobility- ongoing  Short term goal 3: Improve R hip strength by 50% to enable pt to I lift leg into car and perform sit to stand transfers with mild compensated movements- ongoing  Short term goal 4: decrease pain to 5/10 with walking and 2/10 with transitional movements- met 1/27  Short term goal 5: demonstrate I HEP- ongoing  Long term goals  Time Frame for Long term goals : 8 weeks  Long term goal 1: Improve Thirty second sit to stand from 3 to 10- ongoing  Long term goal 2: Improve TUG score from 23 sec to 13 to decrease risk of falls- ongoing  Long term goal 3: pt will resume I ambulation demonstrating good gt components throughout home and community with good functional speed- ongoing  Long term goal 4: Decrease pain to 3/10 with walking community distances and 0-1 with transitional movements- ongoing    Plan:   Visits per week:  2  # of weeks:  8    [x] Continue per plan of care [] Alter current plan (see comments)  [x] Plan of care initiated [] Hold pending MD visit [] Discharge    Plan for Next Session:  Progress strength and functional mobility with decreasing pain    Electronically signed by:  Graylon Prader

## 2021-02-04 ENCOUNTER — OFFICE VISIT (OUTPATIENT)
Dept: INTERNAL MEDICINE CLINIC | Age: 86
End: 2021-02-04
Payer: MEDICARE

## 2021-02-04 VITALS
DIASTOLIC BLOOD PRESSURE: 68 MMHG | WEIGHT: 204 LBS | TEMPERATURE: 97.1 F | SYSTOLIC BLOOD PRESSURE: 118 MMHG | HEART RATE: 76 BPM | HEIGHT: 70 IN | BODY MASS INDEX: 29.2 KG/M2 | OXYGEN SATURATION: 97 %

## 2021-02-04 DIAGNOSIS — I20.9 ANGINA PECTORIS WITH NORMAL CORONARY ARTERIOGRAM (HCC): ICD-10-CM

## 2021-02-04 DIAGNOSIS — I48.0 PAROXYSMAL ATRIAL FIBRILLATION (HCC): ICD-10-CM

## 2021-02-04 DIAGNOSIS — I10 HTN (HYPERTENSION), BENIGN: ICD-10-CM

## 2021-02-04 DIAGNOSIS — E03.9 ACQUIRED HYPOTHYROIDISM: ICD-10-CM

## 2021-02-04 DIAGNOSIS — E78.00 PURE HYPERCHOLESTEROLEMIA: ICD-10-CM

## 2021-02-04 DIAGNOSIS — M53.87 SCIATICA ASSOCIATED WITH DISORDER OF LUMBOSACRAL SPINE: ICD-10-CM

## 2021-02-04 DIAGNOSIS — F32.5 MAJOR DEPRESSIVE DISORDER WITH SINGLE EPISODE, IN FULL REMISSION (HCC): ICD-10-CM

## 2021-02-04 DIAGNOSIS — K21.9 GASTROESOPHAGEAL REFLUX DISEASE WITHOUT ESOPHAGITIS: ICD-10-CM

## 2021-02-04 PROCEDURE — 1036F TOBACCO NON-USER: CPT | Performed by: NURSE PRACTITIONER

## 2021-02-04 PROCEDURE — G8484 FLU IMMUNIZE NO ADMIN: HCPCS | Performed by: NURSE PRACTITIONER

## 2021-02-04 PROCEDURE — 1123F ACP DISCUSS/DSCN MKR DOCD: CPT | Performed by: NURSE PRACTITIONER

## 2021-02-04 PROCEDURE — G8427 DOCREV CUR MEDS BY ELIG CLIN: HCPCS | Performed by: NURSE PRACTITIONER

## 2021-02-04 PROCEDURE — 3288F FALL RISK ASSESSMENT DOCD: CPT | Performed by: NURSE PRACTITIONER

## 2021-02-04 PROCEDURE — G8417 CALC BMI ABV UP PARAM F/U: HCPCS | Performed by: NURSE PRACTITIONER

## 2021-02-04 PROCEDURE — 99214 OFFICE O/P EST MOD 30 MIN: CPT | Performed by: NURSE PRACTITIONER

## 2021-02-04 PROCEDURE — 4040F PNEUMOC VAC/ADMIN/RCVD: CPT | Performed by: NURSE PRACTITIONER

## 2021-02-04 ASSESSMENT — ENCOUNTER SYMPTOMS
BACK PAIN: 1
WHEEZING: 0
CONSTIPATION: 0
SINUS PAIN: 0
SHORTNESS OF BREATH: 0
COUGH: 0
DIARRHEA: 0
ABDOMINAL PAIN: 0
COLOR CHANGE: 0
SINUS PRESSURE: 0

## 2021-02-04 ASSESSMENT — PATIENT HEALTH QUESTIONNAIRE - PHQ9
SUM OF ALL RESPONSES TO PHQ QUESTIONS 1-9: 0
2. FEELING DOWN, DEPRESSED OR HOPELESS: 0
1. LITTLE INTEREST OR PLEASURE IN DOING THINGS: 0
SUM OF ALL RESPONSES TO PHQ9 QUESTIONS 1 & 2: 0

## 2021-02-04 NOTE — PROGRESS NOTES
Page Mi (:  1934) is a 80 y.o. male,Established patient, here for evaluation of the following chief complaint(s):  Hyperlipidemia and Hypertension      ASSESSMENT/PLAN:  1. Acquired hypothyroidism  Assessment & Plan:  Stable, controlled   Continue synthroid  2. Gastroesophageal reflux disease without esophagitis  Assessment & Plan:  Stable, controlled   Continue protonix   No dyspepsia   3. 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 Angina pectoris with normal coronary arteriogram [facility])  Assessment & Plan:  Following with Cardiology   No CP, SOB, palpitations   Continue ASA  5. Hypertension, benign  Assessment & Plan:  Stable, controlled   Continue regimen   6. Major depressive disorder with single episode, in full remission [facility])  Assessment & Plan:  Mood stable   Continue lexapro   7. Paroxysmal atrial fibrillation (HCC)  Assessment & Plan:  Following with cardiology   Rate controlled   BP controlled   Continue regimen   8. Pure hypercholesterolemia  Assessment & Plan:  Stable, controlled   Check lipids   Continue pravastatin       No follow-ups on file. SUBJECTIVE/OBJECTIVE:  HPI  Here for follow up. Doing well with medications. Following with nephrology. Has US kidney done which was normal. Following low potassium diet. Doing well. Following with cardiology for a-fib, CAD. Denies CP, SOB, palpitations, or dizziness. Undergoing PT for chronic low back pain and sciatica. Sciatica pain is slowly improving. Continues with Right hip pain, especially when getting up in the morning. Taking tylenol sparingly. Doing well.        Current Outpatient Medications   Medication Sig Dispense Refill    digoxin (LANOXIN) 125 MCG tablet Take 125 mcg by mouth every other day      escitalopram (LEXAPRO) 10 MG tablet TAKE 1 TABLET BY MOUTH DAILY 90 tablet 0    SYNTHROID 88 MCG tablet TAKE 1 TABLET BY MOUTH DAILY 90 tablet 0    docusate sodium (DOK) 100 MG capsule TAKE 1 CAPSULE BY MOUTH TWICE DAILY 60 capsule 0    pantoprazole (PROTONIX) 40 MG tablet TAKE 1 TABLET BY MOUTH EVERY MORNING BEFORE BREAKFAST 90 tablet 1    clobetasol (TEMOVATE) 0.05 % cream Apply twice a day as needed. 60 g 3    ramipril (ALTACE) 10 MG capsule TAKE 1 CAPSULE BY MOUTH DAILY 90 capsule 0    tamsulosin (FLOMAX) 0.4 MG capsule Take 0.8 mg by mouth daily       pravastatin (PRAVACHOL) 40 MG tablet TAKE 1 TABLET BY MOUTH AT BEDTIME 90 tablet 3    flecainide (TAMBOCOR) 100 MG tablet Take 100 mg by mouth 2 times daily.  aspirin 81 MG tablet Take 81 mg by mouth daily.  therapeutic multivitamin-minerals (THERAGRAN-M) tablet Take 1 tablet by mouth daily. No current facility-administered medications for this visit. Review of Systems   Constitutional: Negative for chills, fatigue and fever. HENT: Negative for congestion, sinus pressure and sinus pain. Respiratory: Negative for cough, shortness of breath and wheezing. Cardiovascular: Negative for chest pain and palpitations. Gastrointestinal: Negative for abdominal pain, constipation and diarrhea. Musculoskeletal: Positive for arthralgias (right hip) and back pain. Negative for myalgias. Skin: Negative for color change, pallor and rash. Neurological: Negative for dizziness, syncope, weakness, light-headedness and headaches. Psychiatric/Behavioral: Negative for behavioral problems, confusion and sleep disturbance. The patient is not nervous/anxious. Vitals:    02/04/21 1034   BP: 118/68   Site: Left Upper Arm   Position: Sitting   Cuff Size: Large Adult   Pulse: 76   Temp: 97.1 °F (36.2 °C)   TempSrc: Temporal   SpO2: 97%   Weight: 204 lb (92.5 kg)   Height: 5' 10\" (1.778 m)       Physical Exam  Constitutional:       Appearance: He is well-developed. HENT:      Head: Normocephalic and atraumatic.    Eyes:      Conjunctiva/sclera: Conjunctivae normal.      Pupils: Pupils are equal, round, and reactive to light. Neck:      Musculoskeletal: Normal range of motion and neck supple. Thyroid: No thyromegaly. Vascular: No JVD. Cardiovascular:      Rate and Rhythm: Normal rate and regular rhythm. Heart sounds: Normal heart sounds. Pulmonary:      Effort: Pulmonary effort is normal. No respiratory distress. Breath sounds: Normal breath sounds. No wheezing. Musculoskeletal: Normal range of motion. General: No deformity. Skin:     General: Skin is warm and dry. Capillary Refill: Capillary refill takes less than 2 seconds. Neurological:      Mental Status: He is alert and oriented to person, place, and time. Psychiatric:         Mood and Affect: Mood normal.         Behavior: Behavior normal.       On this date 02/04/21 I have spent 30 minutes reviewing previous notes, test results and face to face with the patient discussing the diagnosis and importance of compliance with the treatment plan as well as documenting on the day of the visit. An electronic signature was used to authenticate this note.     --SOFY Clark - CNP

## 2021-02-08 ENCOUNTER — HOSPITAL ENCOUNTER (OUTPATIENT)
Dept: PHYSICAL THERAPY | Age: 86
Setting detail: THERAPIES SERIES
Discharge: HOME | End: 2021-02-08
Payer: MEDICARE

## 2021-02-08 PROCEDURE — 97140 MANUAL THERAPY 1/> REGIONS: CPT

## 2021-02-08 PROCEDURE — G0283 ELEC STIM OTHER THAN WOUND: HCPCS

## 2021-02-08 PROCEDURE — 97110 THERAPEUTIC EXERCISES: CPT

## 2021-02-08 NOTE — FLOWSHEET NOTE
Physical Therapy Daily Treatment Note  Date: 2021    Patient Name:  Bakari Petit    :  1934  MRN: [id number]  Restrictions/Precautions:    Restrictions/Precautions  Restrictions/Precautions: (as tolerated)   Medical/Treatment Diagnosis Information:  · Diagnosis: M53.87  · Treatment Diagnosis: R LE pain and weakness  Insurance/Certification information:     Insurance Allowable Visits:    Physician Information:     MD Follow-up Visit:   Plan of care signed (Y/N):    Visit# / total visits: 10 / 16  Pain level: 8/10 hip/back w/ walking    Progress Note Due (10 visits or 30 days, whichever is less):    Recertification Note Due (End of POC or 90 days, whichever is less):      Subjective: Increased pain in \"hip\" today, reports stretches help to decrease the pain. Pain Screening  Patient Currently in Pain: Yes  Pain Assessment  Pain Assessment: 0-10  Pain Level: 7  Patient's Stated Pain Goal: No pain  Pain Type: Chronic pain  Pain Location: Hip  Pain Orientation: Right  Pain Radiating Towards: anterior thigh  Pain Descriptors: Aching  Pain Frequency: Intermittent     Objective:  Observation: Palpation: tenderness over L4-5 spinous processes, and R quadratus area  ? Observation:  antalgic limp  and compensated movement with transitional movements  ? Test measurements:    PROM RLE (degrees)  RLE General PROM: (supine) hip flex 108, hip ext -11 from neutral, abd 18, add 5 (pain), R knee -10- 120  PROM LLE (degrees)  LLE General PROM: (supine) hip flex 120,ext -10 from neutral, abd 40(w/overpressure),add 20, knee -  Lumbar: forward flex 18\" finger tip to floor, ext 0, R SB 1\" fingertip to suprapatellar border, R rotation 0(turns head only).  L SB 1.5\" to suprapatellar border, L rotation 0(turns head only)    21: lumbar mobility forward flexion 10\" finger tips to floor, LSB 25,RSB 30, ext is minimal, rotation R 20, L rot 10    Strength RLE Comment: hip flex 3-, abd 3, ext 2,quad 4+,HS 3,df/pf 3(all limited by pain)  Flexibility: B HS -45(90/90), overall poor flexibility throughout B hips and low back with max limitations in functional mobility  Special Tests: TU sec, Thirty sec sit to stand: 3    21: Thirty sec sit to stand: 9, hip flex 3/5, ext 3-    Exercises, Neuro Facilitation & Gait Training (91245, [id number], [id number]): Activity Resistance/Repetitions Other comments         10 Cues for pelvic movement   R seated quadratus stretch 1 min     sktc 30 sec x 2 ea    Trunk rotations 30 sec x 3 ea    Piriformis stretch passive    Prone rectus stretc passive    passive    10    10 HEP    10 HEP    long leg pulls/lateral distraction 1 min x 3 trials    Hamstring stretch 30 sec holds x 3                Therapeutic Activities (27165):  TU sec    Home Exercise Program: continue stretches    Manual Treatments (97186): R hip lateral distraction and long leg pulls x 12 min    Modalities:  INF/heat x 15 R lumbar/quadratus in prone    Timed Code Treatment Minutes:  INF x 15, MT x 13, TE x  26    Total Treatment Minutes:  54 min    Treatment/Activity Tolerance:  [x] Patient tolerated treatment well [] Patient limited by fatigue  [] Patient limited by pain  [] Patient limited by other medical complications  [] Other:     Assessment  Required cues for piriformis stretch demonstrating improved understanding and technique following instruction. Experienced no hip pain with lateral distraction and experienced 1 episode of \"muscle spasm\" with release of long leg pull/distractions. Pain was decreased following stretches but exacerbated with lying prone for INF with pt to be supine with pillow under knees for upcoming visits Pt will continue to benefit from ongoing PT to progress strength and ROM to restore normal pain free mobility and function.     Prognosis: [x] Good [] Fair  [] Poor    Patient Requires Follow-up: [x] Yes  [] No    Goals:  Short term goals Time Frame for Short term goals: 8 weeks  Short term goal 1: Improve R hip ROM to [facility] to enable normal functional mobility with ADLs ie donning/doffing shoes and pants- ongoing  Short term goal 2: Improve ROM lumbar spine to [facility] for normal functional mobility- ongoing  Short term goal 3: Improve R hip strength by 50% to enable pt to I lift leg into car and perform sit to stand transfers with mild compensated movements- ongoing  Short term goal 4: decrease pain to 5/10 with walking and 2/10 with transitional movements- met 1/27  Short term goal 5: demonstrate I HEP- ongoing  Long term goals  Time Frame for Long term goals : 8 weeks  Long term goal 1: Improve Thirty second sit to stand from 3 to 10- ongoing  Long term goal 2: Improve TUG score from 23 sec to 13 to decrease risk of falls- ongoing  Long term goal 3: pt will resume I ambulation demonstrating good gt components throughout home and community with good functional speed- ongoing  Long term goal 4: Decrease pain to 3/10 with walking community distances and 0-1 with transitional movements- ongoing    Plan:   Visits per week:  2  # of weeks:  8    [x] Continue per plan of care [] Alter current plan (see comments)  [x] Plan of care initiated [] Hold pending MD visit [] Discharge    Plan for Next Session:  Progress note next visit    Electronically signed by:  Mendez Falcon

## 2021-02-10 ENCOUNTER — HOSPITAL ENCOUNTER (OUTPATIENT)
Dept: PHYSICAL THERAPY | Age: 86
Setting detail: THERAPIES SERIES
Discharge: HOME | End: 2021-02-10
Payer: MEDICARE

## 2021-02-10 NOTE — CARE COORDINATION
Physical Therapy  Cancellation/No-show Note  Patient Name:  Rodrigue Laura  :  1934   Date:  2/10/2021  MRN: [id number]  Cancelled visits to date: 0  No-shows to date: 0    For today's appointment patient:  [x]  Cancelled  []  Rescheduled appointment  []  No-show     Reason given by patient:  []  Patient ill  []  Conflicting appointment  []  No transportation    []  Conflict with work  []  No reason given  []  Other:     Comments:  weather  Electronically signed by:  Helena Headley PT

## 2021-02-11 ENCOUNTER — IMMUNIZATION (OUTPATIENT)
Dept: PRIMARY CARE CLINIC | Age: 86
End: 2021-02-11
Payer: MEDICARE

## 2021-02-11 PROCEDURE — 91300 COVID-19, PFIZER VACCINE 30MCG/0.3ML DOSE: CPT | Performed by: FAMILY MEDICINE

## 2021-02-11 PROCEDURE — 0002A COVID-19, PFIZER VACCINE 30MCG/0.3ML DOSE: CPT | Performed by: FAMILY MEDICINE

## 2021-02-15 ENCOUNTER — HOSPITAL ENCOUNTER (OUTPATIENT)
Dept: PHYSICAL THERAPY | Age: 86
Setting detail: THERAPIES SERIES
Discharge: HOME | End: 2021-02-15
Payer: MEDICARE

## 2021-02-15 NOTE — CARE COORDINATION
Physical Therapy  Cancellation/No-show Note  Patient Name:  Rodrigue Laura  :  1934   Date:  2/15/2021  MRN: [id number]  Cancelled visits to date: 0  No-shows to date: 0    For today's appointment patient:  [x]  Cancelled  []  Rescheduled appointment  []  No-show     Reason given by patient:  []  Patient ill  []  Conflicting appointment  []  No transportation    []  Conflict with work  []  No reason given  []  Other:     Comments:  weather  Electronically signed by:  Helena Headley PT

## 2021-02-17 ENCOUNTER — HOSPITAL ENCOUNTER (OUTPATIENT)
Dept: PHYSICAL THERAPY | Age: 86
Setting detail: THERAPIES SERIES
Discharge: HOME | End: 2021-02-17
Payer: MEDICARE

## 2021-02-17 NOTE — CARE COORDINATION
Physical Therapy  Cancellation/No-show Note  Patient Name:  Elizabeth Zaidi  :  1934   Date:  2021  MRN: [id number]  Cancelled visits to date: 0  No-shows to date: 0    For today's appointment patient:  [x]  Cancelled  []  Rescheduled appointment  []  No-show     Reason given by patient:  []  Patient ill  []  Conflicting appointment  []  No transportation    []  Conflict with work  []  No reason given  []  Other:     Comments:  weather  Electronically signed by:  Skip Bonilla PT

## 2021-02-22 ENCOUNTER — HOSPITAL ENCOUNTER (OUTPATIENT)
Dept: PHYSICAL THERAPY | Age: 86
Setting detail: THERAPIES SERIES
Discharge: HOME | End: 2021-02-22
Payer: MEDICARE

## 2021-02-22 PROCEDURE — 97110 THERAPEUTIC EXERCISES: CPT

## 2021-02-22 PROCEDURE — 97140 MANUAL THERAPY 1/> REGIONS: CPT

## 2021-02-22 PROCEDURE — 97012 MECHANICAL TRACTION THERAPY: CPT

## 2021-02-22 NOTE — FLOWSHEET NOTE
Physical Therapy Daily Treatment Note  Date: 2021    Patient Name:  Aleida Jeffers    :  1934  MRN: [id number]  Restrictions/Precautions:    Restrictions/Precautions  Restrictions/Precautions: (as tolerated)   Medical/Treatment Diagnosis Information:  · Diagnosis: M53.87 Sciatica associated with lumbosacral spine  · Treatment Diagnosis: R LE pain and weakness  Insurance/Certification information:     Insurance Allowable Visits:    Physician Information:     MD Follow-up Visit:   Plan of care signed (Y/N):    Visit# / total visits:   Pain level: 5-6/10 R hip/back     Progress Note Due (10 visits or 30 days, whichever is less):    Recertification Note Due (End of POC or 90 days, whichever is less):      Subjective: pt reports falling yesterday in the home, sat on couch for 5 min when phone rang- he jumped up quickly to get the phone and \"L leg wasn't there\". Reports feeling like \"I dont have a foot, and it wasn't asleep because I had only sat on the couch 5 minutes\", states \"it seems to happen when my knee has been bent\" denies injury, reports ability to get up I. Daughter currently in town for a few days from [place]. Pain Screening  Patient Currently in Pain: Yes  Pain Assessment  Pain Assessment: 0-10  Pain Level: 7  Patient's Stated Pain Goal: No pain  Pain Type: Chronic pain  Pain Location: Hip  Pain Orientation: Right  Pain Radiating Towards: anterior thigh  Pain Descriptors: Aching  Pain Frequency: Intermittent     Objective:  Observation: Palpation: tenderness over L4-5 spinous processes, and R quadratus area  ? Observation:  antalgic limp  and compensated movement with transitional movements  ?  Test measurements:    PROM RLE (degrees)  RLE General PROM: (supine) hip flex 108, hip ext -5 neutral, abd 18, add 5 (pain), R knee -10- 120  PROM LLE (degrees)  LLE General PROM: (supine) hip flex 120,ext -10 from neutral, abd 40(w/overpressure),add 20, knee - Lumbar: forward flex 18\" finger tip to floor, ext 0, R SB 1\" fingertip to suprapatellar border, R rotation 0(turns head only). L SB 1.5\" to suprapatellar border, L rotation 0(turns head only)    21: lumbar mobility forward flexion 10\" finger tips to floor, LSB 25,RSB 30, ext is minimal, rotation R 20, L rot 10    Strength RLE  Comment: hip flex 3-, abd 3, ext 2,quad 4+,HS 3,df/pf 3(all limited by pain)  Flexibility: B HS -45(90/90), overall poor flexibility throughout B hips and low back with max limitations in functional mobility  Special Tests: TU sec, Thirty sec sit to stand: 3    21: Thirty sec sit to stand: 9, hip flex 3/5, ext 3-  : Thirty sec sit to stand: 12,     Exercises, Neuro Facilitation & Gait Training (89075, [id number], [id number]): Activity Resistance/Repetitions Other comments         10 Cues for pelvic movement    1 min          Trunk rotations 30 sec x 3 ea    h passive    Prone rectus stretc passive    passive    10    10 HEP    10 HEP    long leg pulls/lateral hip distraction 1 min x 3 trials    Hamstring stretch 30 sec holds x 3         Bike x 5 min       Therapeutic Activities (11533): Thirty sec sit to stand: 12. Pt instructed in safety with sit to stand with slow position changes and instructed to stand a moment prior to taking off to walk.  Instructed in use and safety with RW with recommendation for pt to get a walker for daily use to prevent falls    Home Exercise Program: continue stretches    Manual Treatments (40302): R hip lateral distraction and long leg pulls x 10 min with good relief of pain    Modalities: mechanical lumbar traction (200# max30%,min5#,6 step progression),  Timed Code Treatment Minutes:   MT x 10, traction  X 15, TE x 21    Total Treatment Minutes:  46 min    Treatment/Activity Tolerance:  [x] Patient tolerated treatment well [] Patient limited by fatigue  [] Patient limited by pain  [] Patient limited by other medical complications  [] Other: Long term goal 2: Improve TUG score from 23 sec to 13 to decrease risk of falls- ongoing  Long term goal 3: pt will resume I ambulation demonstrating good gt components throughout home and community with good functional speed- ongoing  Long term goal 4: Decrease pain to 3/10 with walking community distances and 0-1 with transitional movements- ongoing    Plan:   Visits per week:  2  # of weeks:  8    [x] Continue per plan of care [] Alter current plan (see comments)  [] Plan of care initiated [] Hold pending MD visit [] Discharge    Plan for Next Session:  F/u with AD    Electronically signed by:  Lyudmila Pierre

## 2021-02-23 NOTE — PROGRESS NOTES
Physical Therapy        Outpatient Physical Therapy  Phone: [phone] Fax: [phone]    To: Allen Styles     From: Skip Bonilla, PT   Date: 2021  Patient: Elizabeth Zaidi     : 1934 MRN: [id number]  Medical Diagnosis:  M53.87 Sciatica associated with lumbosacral spine  Treatment Diagnosis:    R LE pain and weakness related to sciatica    Physical Therapy Progress Note    Time Period for Report:  21-21 (note late due to multiple cancellations due to weather)    Total Visits to date:   11 Cancels/No-shows to date:  5    Plan of Care/Treatment to date:  [x] Therapeutic Exercise (Review/Progress HEP and provide verbal/tactile cueing for activities related to strengthening, flexibility,  endurance, ROM.)       [x] Therapeutic Activity (Provide verbal/tactile cueing for dynamic activities to promote functional tasks.)          [] Gait Training (Provide verbal/tactile/visual cueing for facilitation of normalized gait pattern without or with the least restrictive AD to decrease pain and/or risk for falling.)          [x] Neuromuscular Re-education (Review/Progress HEP and provide verbal/tactile cueing for activities related to improving balance, coordination, kinesthetic sense, posture, motor skill, proprioception.)         [x] Manual Therapy (Provide manual therapy to mobilize soft tissue/joints for the purpose of modulating pain, promoting relaxation, increasing ROM, reducing/eliminating soft tissue swelling/inflammation/tightness, improving soft tissue extensibility)                [x] Modalities (For modulating pain/tenderness/paresthesias, reducing swelling/inflammation/tightness, improving soft tissue extensibility, and/or to increase muscle tone/strength):     [] Ultrasound  [x] Electrical Stimulation        [] Cervical Traction [x] Lumbar Traction    ?    [] Cold/hotpack [] Iontophoresis   Other:      []          []     Significant Findings At Last Visit/Comments:    · Good/bad days with pain- at best 3-4/10 and at worst 8/10   · Lumbar ROM improved forward flexion 10\" finger tips to floor, LSB 25,RSB 30, ext is minimal, rotation R 20, L rot 10. · R hip ROM: flex 110, ext to neutral, abd 20, IR 20, ER 35   · Strength R LE: 3/5 (painful)  · (+) damian and hip quadrant test with good pain relief with hip distraction  · New onset fall with recommendation for pt to get RW for safety  · Demonstrated normal gait components and balance with use of walker in clinic  · Difficult ambulating in community requesting handicap sticker  · Thirty sec sit to stand improved from 9 to 12      Progress towards goals:   Pt progress in slow due to inability to be seen consistently due to cancellations from illness and weather. Progress also effected by multiple comorbidities with hip/knee OA. Pt continues with limitations in ROM,strength,unsteady gait and increased pain with ongoing PT recommended to maximize functional mobility and safety.     Short term goals  Time Frame for Short term goals: 8 weeks  Short term goal 1: Improve R hip ROM to [facility] to enable normal functional mobility with ADLs ie donning/doffing shoes and pants- ongoing  Short term goal 2: Improve ROM lumbar spine to [facility] for normal functional mobility- ongoing  Short term goal 3: Improve R hip strength by 50% to enable pt to I lift leg into car and perform sit to stand transfers with mild compensated movements- ongoing  Short term goal 4: decrease pain to 5/10 with walking and 2/10 with transitional movements- met 1/27  Short term goal 5: demonstrate I HEP- ongoing  Long term goals  Time Frame for Long term goals : 8 weeks  Long term goal 1: Improve Thirty second sit to stand from 3 to 10- met 2/22  Long term goal 2: Improve TUG score from 23 sec to 13 to decrease risk of falls- ongoing  Long term goal 3: pt will resume I ambulation demonstrating good gt components throughout home and community with good functional speed- ongoing  Long term goal 4: Decrease pain to 3/10 with walking community distances and 0-1 with transitional movements- ongoing    Frequency/Duration: (5 visits remaining on original POC)  # Days per week: [] 1 day # Weeks: [] 1 week [] 4 weeks      [x] 2 days? [] 2 weeks [] 5 weeks      [] 3 days   [] 3 weeks [x] 8 weeks     Rehab Potential: [] Excellent [] Good [x] Fair  [] Poor     Goal Status:  [] Achieved [x] Partially Achieved  [] Not Achieved     Patient Status: [x] Continue per initial plan of Care     [] Patient now discharged     [] Additional visits requested, Please re-certify for additional visits:      Requested frequency/duration:  X/week for weeks    Electronically signed by:  Nathanael Stack PT    If you have any questions or concerns, please don't hesitate to call.   Thank you for your referral.    Physician Signature:________________________________Date:__________________  By signing above, therapists plan is approved by physician

## 2021-02-24 ENCOUNTER — HOSPITAL ENCOUNTER (OUTPATIENT)
Dept: GENERAL RADIOLOGY | Age: 86
Discharge: HOME | End: 2021-02-24
Payer: MEDICARE

## 2021-02-24 ENCOUNTER — HOSPITAL ENCOUNTER (OUTPATIENT)
Dept: PHYSICAL THERAPY | Age: 86
Setting detail: THERAPIES SERIES
Discharge: HOME | End: 2021-02-24
Payer: MEDICARE

## 2021-02-24 ENCOUNTER — HOSPITAL ENCOUNTER (OUTPATIENT)
Age: 86
Discharge: HOME | End: 2021-02-24
Payer: MEDICARE

## 2021-02-24 ENCOUNTER — TELEPHONE (OUTPATIENT)
Dept: INTERNAL MEDICINE CLINIC | Age: 86
End: 2021-02-24

## 2021-02-24 DIAGNOSIS — G89.29 CHRONIC BILATERAL LOW BACK PAIN WITH RIGHT-SIDED SCIATICA: Primary | ICD-10-CM

## 2021-02-24 DIAGNOSIS — M53.87 SCIATICA ASSOCIATED WITH DISORDER OF LUMBOSACRAL SPINE: ICD-10-CM

## 2021-02-24 DIAGNOSIS — M17.11 PRIMARY OSTEOARTHRITIS OF RIGHT KNEE: Primary | ICD-10-CM

## 2021-02-24 DIAGNOSIS — G89.29 CHRONIC PAIN OF RIGHT HIP: ICD-10-CM

## 2021-02-24 DIAGNOSIS — G89.29 CHRONIC BILATERAL LOW BACK PAIN WITH RIGHT-SIDED SCIATICA: ICD-10-CM

## 2021-02-24 DIAGNOSIS — M54.41 CHRONIC BILATERAL LOW BACK PAIN WITH RIGHT-SIDED SCIATICA: Primary | ICD-10-CM

## 2021-02-24 DIAGNOSIS — M25.551 CHRONIC PAIN OF RIGHT HIP: ICD-10-CM

## 2021-02-24 DIAGNOSIS — M54.41 CHRONIC BILATERAL LOW BACK PAIN WITH RIGHT-SIDED SCIATICA: ICD-10-CM

## 2021-02-24 PROCEDURE — 97140 MANUAL THERAPY 1/> REGIONS: CPT

## 2021-02-24 PROCEDURE — 73502 X-RAY EXAM HIP UNI 2-3 VIEWS: CPT

## 2021-02-24 PROCEDURE — 72100 X-RAY EXAM L-S SPINE 2/3 VWS: CPT

## 2021-02-24 PROCEDURE — 97116 GAIT TRAINING THERAPY: CPT

## 2021-02-24 NOTE — TELEPHONE ENCOUNTER
Not responding to lower back PT  Limping  His physical therapist thinks it may be his right hip  His back is a \"hot mess\", arthritis  Wants a prescription for a walker

## 2021-02-24 NOTE — FLOWSHEET NOTE
Physical Therapy Daily Treatment Note  Date: 2021    Patient Name:  Murtaza Moore    :  1934  MRN: [id number]  Restrictions/Precautions:    Restrictions/Precautions  Restrictions/Precautions: (as tolerated)   Medical/Treatment Diagnosis Information:  · Diagnosis: M53.87 Sciatica associated with lumbosacral spine  · Treatment Diagnosis: R LE pain and weakness  Insurance/Certification information:     Insurance Allowable Visits:    Physician Information:     MD Follow-up Visit:   Plan of care signed (Y/N):    Visit# / total visits:   Pain level: 5-6/10 R hip/back     Progress Note Due (10 visits or 30 days, whichever is less):    Recertification Note Due (End of POC or 90 days, whichever is less):      Subjective: no new c/o's, reports no changes in R hip pain following traction last visit. Denies new falls, didn't look into borrowing a walker and is now agreeable to getting one of his own    Pain Screening  Patient Currently in Pain: Yes  Pain Assessment  Pain Assessment: 0-10  Pain Level: 7  Patient's Stated Pain Goal: No pain  Pain Type: Chronic pain  Pain Location: Hip  Pain Orientation: Right  Pain Radiating Towards: anterior thigh  Pain Descriptors: Aching  Pain Frequency: Intermittent     Objective:  Observation: Palpation: tenderness over L4-5 spinous processes, and R quadratus area  ? Observation:  antalgic limp  and compensated movement with transitional movements  ? Test measurements:    PROM RLE (degrees)  RLE General PROM: (supine) hip flex 100, hip ext-10  neutral, abd 26, add 10 (pain), R knee -10- 120  PROM LLE (degrees)  LLE General PROM: (supine) hip flex 110,ext neutral, abd 32 deg(w/overpressure),add 20, knee -  Lumbar: forward flex 18\" finger tip to floor, ext 0, R SB 1\" fingertip to suprapatellar border, R rotation 0(turns head only).  L SB 1.5\" to suprapatellar border, L rotation 0(turns head only) 21: lumbar mobility forward flexion 10\" finger tips to floor, LSB 25,RSB 30, ext is minimal, rotation R 20, L rot 10    Strength RLE  Comment: hip flex 3+, abd 3, ext 3,quad 5/5 ,HS 3,df/pf 3  Flexibility: B HS --32 (90/90)  Special Tests: TU sec, Thirty sec sit to stand: 3    21: Thirty sec sit to stand: 9, hip flex 3/5, ext 3-  : Thirty sec sit to stand: 12,     Exercises, Neuro Facilitation & Gait Training (36236, [id number], [id number]): Activity Resistance/Repetitions Other comments         10 Cues for pelvic movement   R seated quadratus stretch 1 min x 2         Trunk rotations 30 sec x 3 ea    Piriformis stretch passive     passive    passive    Rectus femoris [id number] sec-1 min    10 HEP    10 HEP    long leg pulls/lateral hip distraction 2 min x 5 trials    Hamstring stretch 30 sec holds x 3                Therapeutic Activities (90974):  Education provided on various types of walker available, adjusted walker height and provided gt training with rollator in dept with instruction in walker parts,folding/unfolding,locking brakes, gt training and instruction in safe walker distances and sequencing,and transfers on/off rollator seat.     Home Exercise Program: continue stretches,no changes    Manual Treatments (36092): R long leg pulls/distraction(0/45 deg)  x 10 min with good relief of pain,passive hip flexor stretch/piriformis stretch and hamstring stretch    Modalities:  Timed Code Treatment Minutes:   Gt x 30, MT x 18    Total Treatment Minutes:  48 min    Treatment/Activity Tolerance:  [x] Patient tolerated treatment well [] Patient limited by fatigue  [] Patient limited by pain  [] Patient limited by other medical complications  [] Other: Assessment : Pt continues with significant antalgic limp on R LE. No changes in pain following lumbar traction last visit. Pt presents with + Scour test and + Tonja test with R hip pain and limited ROM. Called MD office with request for R hip xray and script for rollator with pt wanting to stop by today to  script while in the area. Pt agreeable to AD and verbalizes understanding of use and safety. Pt does not appear to be improving with treatment of low back and maybe hip xray will shed new light on situation as pt continues to function below baseline level. Pt will benefit from continued therapy to maximize functional mobility and safety with new AD.     Prognosis: [x] Good [] Fair  [] Poor    Patient Requires Follow-up: [x] Yes  [] No    Goals:  Short term goals  Time Frame for Short term goals: 8 weeks  Short term goal 1: Improve R hip ROM to [facility] to enable normal functional mobility with ADLs ie donning/doffing shoes and pants- ongoing  Short term goal 2: Improve ROM lumbar spine to [facility] for normal functional mobility- ongoing  Short term goal 3: Improve R hip strength by 50% to enable pt to I lift leg into car and perform sit to stand transfers with mild compensated movements- ongoing  Short term goal 4: decrease pain to 5/10 with walking and 2/10 with transitional movements- met 1/27  Short term goal 5: demonstrate I HEP- ongoing  Long term goals  Time Frame for Long term goals : 8 weeks  Long term goal 1: Improve Thirty second sit to stand from 3 to 10- met 2/22  Long term goal 2: Improve TUG score from 23 sec to 13 to decrease risk of falls- ongoing  Long term goal 3: pt will resume I ambulation demonstrating good gt components throughout home and community with good functional speed- ongoing  Long term goal 4: Decrease pain to 3/10 with walking community distances and 0-1 with transitional movements- ongoing    Plan:   Visits per week:  2  # of weeks:  8 [x] Continue per plan of care [] Alter current plan (see comments)  [] Plan of care initiated [] Hold pending MD visit [] Discharge    Plan for Next Session:  F/u with AD    Electronically signed by:  Ofelia Graves

## 2021-02-25 ENCOUNTER — TELEPHONE (OUTPATIENT)
Dept: INTERNAL MEDICINE CLINIC | Age: 86
End: 2021-02-25

## 2021-02-25 DIAGNOSIS — M16.0 PRIMARY OSTEOARTHRITIS OF BOTH HIPS: Primary | ICD-10-CM

## 2021-03-01 ENCOUNTER — HOSPITAL ENCOUNTER (OUTPATIENT)
Dept: PHYSICAL THERAPY | Age: 86
Setting detail: THERAPIES SERIES
Discharge: HOME | End: 2021-03-01
Payer: MEDICARE

## 2021-03-01 PROCEDURE — 97116 GAIT TRAINING THERAPY: CPT

## 2021-03-01 PROCEDURE — 97140 MANUAL THERAPY 1/> REGIONS: CPT

## 2021-03-01 PROCEDURE — 97110 THERAPEUTIC EXERCISES: CPT

## 2021-03-01 NOTE — FLOWSHEET NOTE
Physical Therapy Daily Treatment Note  Date: 3/1/2021    Patient Name:  Ernesto Richard    :  1934  MRN: [id number]  Restrictions/Precautions:    Restrictions/Precautions  Restrictions/Precautions: (as tolerated)   Medical/Treatment Diagnosis Information:  · Diagnosis: M53.87 Sciatica associated with lumbosacral spine  · Treatment Diagnosis: R LE pain and weakness  Insurance/Certification information:     Insurance Allowable Visits:    Physician Information:     MD Follow-up Visit:   Plan of care signed (Y/N):    Visit# / total visits:   Pain level: 5-6/10 R hip/back     Progress Note Due (10 visits or 30 days, whichever is less):  97  Recertification Note Due (End of POC or 90 days, whichever is less):      Subjective:  got script for walker following PT last week but wants to get rollator like one here in clinic and needs info written down, got xrays on Wednesday for R hip and low back, no significant changes in pain, pt referred to ortho but wants to finish therapy first     Pain Screening  Patient Currently in Pain: Yes  Pain Assessment  Pain Assessment: 0-10  Pain Level: 7  Patient's Stated Pain Goal: No pain  Pain Type: Chronic pain  Pain Location: Hip  Pain Orientation: Right  Pain Radiating Towards: anterior thigh  Pain Descriptors: Aching  Pain Frequency: Intermittent     Objective:  Observation: Palpation: tenderness over L4-5 spinous processes, and R quadratus area  ? Observation:  antalgic limp  and compensated movement with transitional movements  ?  Test measurements:    PROM RLE (degrees)  RLE General PROM: (supine) hip flex 100, hip ext-10  neutral, abd 26, add 10 (pain), R knee -10- 120  PROM LLE (degrees)  LLE General PROM: (supine) hip flex 110,ext neutral, abd 32 deg(w/overpressure),add 20, knee - Lumbar: forward flex 18\" finger tip to floor, ext 0, R SB 1\" fingertip to suprapatellar border, R rotation 0(turns head only). L SB 1.5\" to suprapatellar border, L rotation 0(turns head only)    21: lumbar mobility forward flexion 10\" finger tips to floor, LSB 25,RSB 30, ext is minimal, rotation R 20, L rot 10    Strength RLE  Comment: hip flex 3+, abd 3, ext 3,quad 5/5 ,HS 3,df/pf 3  Flexibility: B HS --32 (90/90)  Special Tests: TU sec, Thirty sec sit to stand: 3    21: Thirty sec sit to stand: 9, hip flex 3/5, ext 3-  : Thirty sec sit to stand: 12  3/1/21: TUG 16 sec    Exercises, Neuro Facilitation & Gait Training (20650, [id number], [id number]): Activity Resistance/Repetitions Other comments         10 Cues for pelvic movement   R seated quadratus stretch 1 min x 2    ITB stretch 30 sec-1 min    Trunk rotations 30 sec x 3 ea    Piriformis stretch passive     passive    HS,AS,LAQ10 ea    30 sec-1 min    10 HEP    10 HEP    long leg pulls/lateral hip distraction passive    Hamstring stretch 30 sec holds x 3         Bike  X 5 min, 2.0      Therapeutic Activities (66255):  Education provided on various types of walker available, adjusted walker height and provided gt training with rollator in dept with instruction in walker parts,folding/unfolding,locking brakes, gt training and instruction in safe walker distances and sequencing, transfers on/off rollator seat.  Info written down and provided for pt    Home Exercise Program: added bike and basic LE strengthening, instructed in heat and gentle exercise for arthritis     Manual Treatments (10300): R long leg pull/distraction(0/45 deg)  x 10 min, lateral R hip distraction with good relief of pain,passive ITB/piriformis and hamstring stretches    Modalities:  Timed Code Treatment Minutes:   Gt x 15, MT x 20, TE x 16    Total Treatment Minutes:  51 min    Treatment/Activity Tolerance:  [x] Patient tolerated treatment well [] Patient limited by fatigue [] Patient limited by pain  [] Patient limited by other medical complications  [] Other:     Assessment : Pt tolerated all stretches including ITB stretch without R hip pain. Tolerated hip distraction/lateral distraction with almost full relief of pain. Continues with antalgic limp which disappears with use of rollator in clinic. 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 Pt has most likely reached max potential with progression of therapy due to severe DJD with minimal change in pain, but will benefit from remaining 3 visits for instruction in safety/use with new AD, instruct in HEP and maximize functional and safety with education in falls prevention.      Prognosis: [] Good [x] Fair  [] Poor    Patient Requires Follow-up: [x] Yes  [] No    Goals:  Short term goals  Time Frame for Short term goals: 8 weeks  Short term goal 1: Improve R hip ROM to [facility] to enable normal functional mobility with ADLs ie donning/doffing shoes and pants- ongoing  Short term goal 2: Improve ROM lumbar spine to [facility] for normal functional mobility- ongoing  Short term goal 3: Improve R hip strength by 50% to enable pt to I lift leg into car and perform sit to stand transfers with mild compensated movements- ongoing  Short term goal 4: decrease pain to 5/10 with walking and 2/10 with transitional movements- met 1/27  Short term goal 5: demonstrate I HEP- ongoing  Long term goals  Time Frame for Long term goals : 8 weeks  Long term goal 1: Improve Thirty second sit to stand from 3 to 10- met 2/22  Long term goal 2: Improve TUG score from 23 sec to 13 to decrease risk of falls- ongoing  Long term goal 3: pt will resume I ambulation demonstrating good gt components throughout home and community with good functional speed- ongoing  Long term goal 4: Decrease pain to 3/10 with walking community distances and 0-1 with transitional movements- ongoing    Plan:   Visits per week:  2  # of weeks:  8 [x] Continue per plan of care [] Alter current plan (see comments)  [] Plan of care initiated [] Hold pending MD visit [] Discharge    Plan for Next Session:  Continue training with rollator and instruct in HEP/falls prevention    Electronically signed by:  Ofelia Graves

## 2021-03-04 ENCOUNTER — HOSPITAL ENCOUNTER (OUTPATIENT)
Dept: PHYSICAL THERAPY | Age: 86
Setting detail: THERAPIES SERIES
Discharge: HOME | End: 2021-03-04
Payer: MEDICARE

## 2021-03-04 PROCEDURE — 97110 THERAPEUTIC EXERCISES: CPT

## 2021-03-04 PROCEDURE — 97116 GAIT TRAINING THERAPY: CPT

## 2021-03-04 NOTE — FLOWSHEET NOTE
Physical Therapy Daily Treatment Note  Date: 3/4/2021    Patient Name:  Elizabeth Zaidi    :  1934  MRN: [id number]  Restrictions/Precautions:    Restrictions/Precautions  Restrictions/Precautions: (as tolerated)   Medical/Treatment Diagnosis Information:  · Diagnosis: M53.87 Sciatica associated with lumbosacral spine  · Treatment Diagnosis: R LE pain and weakness  Insurance/Certification information:     Insurance Allowable Visits:    Physician Information:     MD Follow-up Visit:   Plan of care signed (Y/N):    Visit# / total visits:   Pain level: 3-4/10 R hip/back/R knee     Progress Note Due (10 visits or 30 days, whichever is less):    Recertification Note Due (End of POC or 90 days, whichever is less):      Subjective:  Presents to clinic using a cane today(in the wrong hand), reports getting a cane and a rollator from a neighbor. Reports pain is \"not too bad\" but pt continues with antalgic limp    Pain Screening  Patient Currently in Pain: Yes  Pain Assessment  Pain Assessment: 0-10  Pain Level: 7  Patient's Stated Pain Goal: No pain  Pain Type: Chronic pain  Pain Location: Hip  Pain Orientation: Right  Pain Radiating Towards: anterior thigh  Pain Descriptors: Aching  Pain Frequency: Intermittent     Objective:  Observation: Palpation: tenderness over L4-5 spinous processes, and R quadratus area  ? Observation:  antalgic limp  and compensated movement with transitional movements  ? Test measurements:    PROM RLE (degrees)  RLE General PROM: (supine) hip flex 100, hip ext-10  neutral, abd 26, add 10 (pain), R knee -10- 120  PROM LLE (degrees)  LLE General PROM: (supine) hip flex 110,ext neutral, abd 32 deg(w/overpressure),add 20, knee -  Lumbar: forward flex 18\" finger tip to floor, ext 0, R SB 1\" fingertip to suprapatellar border, R rotation 0(turns head only).  L SB 1.5\" to suprapatellar border, L rotation 0(turns head only) 21: lumbar mobility forward flexion 10\" finger tips to floor, LSB 25,RSB 30, ext is minimal, rotation R 20, L rot 10    Strength RLE  Comment: hip flex 3+, abd 3, ext 3,quad 5/5 ,HS 3,df/pf 3  Flexibility: B HS --32 (90/90)  Special Tests: TU sec, Thirty sec sit to stand: 3    21: Thirty sec sit to stand: 9, hip flex 3/5, ext 3-  : Thirty sec sit to stand: 12  3/1/21: TUG 16 sec    Exercises, Neuro Facilitation & Gait Training (42341, [id number], [id number]): Activity Resistance/Repetitions Other comments         10 Cues for pelvic movement    1 min x 2    ITB stretch 30 sec-1 min     30 sec x 3 ea     passive     passive    QS,GS,HS,AS,LAQ,SAQ,SLR,[id number] -15eaea    30 sec-1 min    10 HEP    10 HEP    long leg pulls/lateral hip distraction passive    Hamstring stretch 30 sec holds x 3         Bike  X 7 min, 2.0      Therapeutic Activities (83700):  Gt training with cane in L hand, cane adjusted to proper height. Gt training for sequencing, instructed to use rollator when walking in community, instructed in safety with standing a minute to get balance prior to taking off to walk.     Home Exercise Program: progressed  bike and basic LE strengthening, instructed in heat and gentle exercise for arthritis      Manual Treatments (90985): R long leg pull/distraction(0/45 deg)  x 5 min,    Modalities:  Timed Code Treatment Minutes:   Gt x 15, TE x 32    Total Treatment Minutes:  47 min    Treatment/Activity Tolerance:  [x] Patient tolerated treatment well [] Patient limited by fatigue  [] Patient limited by pain  [] Patient limited by other medical complications  [] Other: Assessment : continues with antalgic limp, initially unsteady with cane which improved slightly with following training and adjustments. Tolerated basic strengthening without increased pain and reported feeling \"looser\" with decreased pain especially in his R knee (also with DJD) following treatment. Continued to encourage in use of walker vs cane for safety and improvement with functional ambulation. Will need ongoing education in gt training and therapeutic exercise x 2 visits.  Pt waiting to schedule appt with ortho until PT is over and states he currently has no intention of going through joint replacement surgery     Prognosis: [] Good [x] Fair  [] Poor    Patient Requires Follow-up: [x] Yes  [] No    Goals:  Short term goals  Time Frame for Short term goals: 8 weeks  Short term goal 1: Improve R hip ROM to [facility] to enable normal functional mobility with ADLs ie donning/doffing shoes and pants- ongoing  Short term goal 2: Improve ROM lumbar spine to [facility] for normal functional mobility- ongoing  Short term goal 3: Improve R hip strength by 50% to enable pt to I lift leg into car and perform sit to stand transfers with mild compensated movements- ongoing  Short term goal 4: decrease pain to 5/10 with walking and 2/10 with transitional movements- met 1/27  Short term goal 5: demonstrate I HEP- ongoing  Long term goals  Time Frame for Long term goals : 8 weeks  Long term goal 1: Improve Thirty second sit to stand from 3 to 10- met 2/22  Long term goal 2: Improve TUG score from 23 sec to 13 to decrease risk of falls- ongoing  Long term goal 3: pt will resume I ambulation demonstrating good gt components throughout home and community with good functional speed- ongoing  Long term goal 4: Decrease pain to 3/10 with walking community distances and 0-1 with transitional movements- ongoing    Plan:   Visits per week:  2  # of weeks:  8    [x] Continue per plan of care [] Alter current plan (see comments) [] Plan of care initiated [] Hold pending MD visit [] Discharge    Plan for Next Session:  Continue training with rollator and instruct in HEP/falls prevention    Electronically signed by:  Audra Arriola

## 2021-03-05 ENCOUNTER — TELEPHONE (OUTPATIENT)
Dept: INTERNAL MEDICINE CLINIC | Age: 86
End: 2021-03-05

## 2021-03-05 RX ORDER — PRAVASTATIN SODIUM 40 MG
TABLET ORAL
Qty: 90 TABLET | Refills: 3 | Status: SHIPPED | OUTPATIENT
Start: 2021-03-05 | End: 2021-06-03 | Stop reason: SDUPTHER

## 2021-03-05 RX ORDER — LEVOTHYROXINE SODIUM 88 MCG
TABLET ORAL
Qty: 90 TABLET | Refills: 0 | Status: SHIPPED | OUTPATIENT
Start: 2021-03-05 | End: 2021-05-25 | Stop reason: SDUPTHER

## 2021-03-05 NOTE — TELEPHONE ENCOUNTER
Pt requests refills - Pravastatin and Synthroid.   Pharmacy is [facility], phone [phone]

## 2021-03-08 ENCOUNTER — HOSPITAL ENCOUNTER (OUTPATIENT)
Dept: PHYSICAL THERAPY | Age: 86
Setting detail: THERAPIES SERIES
Discharge: HOME | End: 2021-03-08
Payer: MEDICARE

## 2021-03-08 PROCEDURE — 97110 THERAPEUTIC EXERCISES: CPT

## 2021-03-08 NOTE — FLOWSHEET NOTE
Physical Therapy Daily Treatment Note  Date: 3/8/2021    Patient Name:  Dominik Wright    :  1934  MRN: [id number]  Restrictions/Precautions:    Restrictions/Precautions  Restrictions/Precautions: (as tolerated)   Medical/Treatment Diagnosis Information:  · Diagnosis: M53.87 Sciatica associated with lumbosacral spine  · Treatment Diagnosis: R LE pain and weakness  Insurance/Certification information:     Insurance Allowable Visits:    Physician Information:     MD Follow-up Visit:   Plan of care signed (Y/N):    Visit# / total visits: 15 / 16  Pain level: 4-5/10 R hip/back    Progress Note Due (10 visits or 30 days, whichever is less):  3/55/15  Recertification Note Due (End of POC or 90 days, whichever is less):      Subjective: Presents to clinic using cane. States \"its easier\". Knee feels pretty good today, slight increase in R hip/\"rear\" pain. Spoke with ortho and is to call and make appt. Pt states he is planning to call tomorrow. States PT has really helped him with decreased pain and improved functional mobility and feels he is ready for dc at the end of the week. Denies difficulty with getting R LE into car.     Pain Screening  Patient Currently in Pain: Yes  Pain Assessment  Pain Assessment: 0-10  Pain Level: 7  Patient's Stated Pain Goal: No pain  Pain Type: Chronic pain  Pain Location: Hip  Pain Orientation: Right  Pain Radiating Towards: anterior thigh  Pain Descriptors: Aching  Pain Frequency: Intermittent     Objective:  Observation: Palpation: tenderness over L4-5 spinous processes, and R quadratus area   Observation:  antalgic limp  and compensated movement with transitional movements   Test measurements:    PROM RLE (degrees)  RLE General PROM: (supine) hip flex 100, hip ext-10  neutral, abd 26, add 10 (pain), R knee -10- 120  PROM LLE (degrees)  LLE General PROM: (supine) hip flex 110,ext neutral, abd 32 deg(w/overpressure),add 20, knee -  Lumbar: forward flex 18\" finger tip to floor, ext 0, R SB 1\" fingertip to suprapatellar border, R rotation 0(turns head only). L SB 1.5\" to suprapatellar border, L rotation 0(turns head only)    21: lumbar mobility forward flexion 10\" finger tips to floor, LSB 25,RSB 30, ext is minimal, rotation R 20, L rot 10    Strength RLE  Comment: hip flex 3+, abd 3, ext 3,quad 5/5 ,HS 3,df/pf 3  Flexibility: B HS --32 (90/90)  Special Tests: TU sec, Thirty sec sit to stand: 3    21: Thirty sec sit to stand: 9, hip flex 3/5, ext 3-  : Thirty sec sit to stand: 12  3/1/21: TUG 16 sec    Exercises, Neuro Facilitation & Gait Training ([id number], [id number], [id number]): Activity Resistance/Repetitions Other comments         10 Cues for pelvic movement    1 min x 2    ITB stretch 30 sec-1 min     30 sec x 3 ea     passive     passive    QS,GS,HS,AS,LAQ,SAQ,SLR,[id number] -15eaea    30 sec-1 min    10 HEP    10 HEP    long leg pulls/lateral hip distraction passive    Hamstring stretch 30 sec holds x 3         Bike  X 7 min, 2.0      Therapeutic Activities (78484): Instruction to use RW in community and cane in the home. Home Exercise Program: progressed  bike and basic LE strengthening, instructed in heat and gentle exercise for arthritis      Manual Treatments (06104): R long leg pull/distraction(0/45 deg)  x 5 min, lateral hip distraction 30 sec hold x 4,     Modalities:  Timed Code Treatment Minutes:   TE x 50    Total Treatment Minutes:  50 min    Treatment/Activity Tolerance:  [x] Patient tolerated treatment well [] Patient limited by fatigue  [] Patient limited by pain  [] Patient limited by other medical complications  [] Other:     Assessment : Pt able to ambulate without antalgic limp and reported 3/10 pain in R hip with use of rollator in dept. Tolerates basic LE strengthening/ROM exercises with no pain x 15 reps. R hip flexion to 105 deg and R knee to 130 following exercises. R hip abduction limited to 25 deg with hard end feel and \"joint pain\".  Pt demonstrates I ability for donning/doffing pants/shoes. TUG score 18 sec with cane and improved to 15 sec with rollator. Pt ambulated 400' today using a rollator in under 4 min with good functional speed. Overall, pt demonstrates improved safety and functional independence with use of rollator with recommendation for pt to use rollator in community vs cane. Pt appears safe to use cane in home/kitchen to enable him to have hand free for IADLS. Pt has mod/severe DDD throughout lumbar spine and was treated initially for this with some improvement, however, feel current symptoms are most likely related to DJD in R hip and knee with probable max potential reached with therapy. D/c planning for next visit with pt agreeable and to f/u with ortho for intervention.     Prognosis: [] Good [x] Fair  [] Poor    Patient Requires Follow-up: [x] Yes  [] No    Goals:  Short term goals  Time Frame for Short term goals: 8 weeks  Short term goal 1: Improve R hip ROM to [facility] to enable normal functional mobility with ADLs ie donning/doffing shoes and pants- met 3/8  Short term goal 2: Improve ROM lumbar spine to [facility] for normal functional mobility- ongoing  Short term goal 3: Improve R hip strength by 50% to enable pt to I lift leg into car and perform sit to stand transfers with mild compensated movements- met 3/8  Short term goal 4: decrease pain to 5/10 with walking and 2/10 with transitional movements- met 1/27  Short term goal 5: demonstrate I HEP- ongoing  Long term goals  Time Frame for Long term goals : 8 weeks  Long term goal 1: Improve Thirty second sit to stand from 3 to 10- met 2/22  Long term goal 2: Improve TUG score from 23 sec to 13 to decrease risk of falls- partially met, ongoing  Long term goal 3: pt will resume I ambulation demonstrating good gt components throughout home and community with good functional speed- met 3/8  Long term goal 4: Decrease pain to 3/10 with walking community distances and 0-1 with transitional movements- ongoing    Plan:   Visits per week:  2  # of weeks:  8    [x] Continue per plan of care [] Alter current plan (see comments)  [] Plan of care initiated [] Hold pending MD visit [] Discharge    Plan for Next Session:  Dc next visit    Electronically signed by:  Beverley Shaw

## 2021-03-11 ENCOUNTER — HOSPITAL ENCOUNTER (OUTPATIENT)
Dept: PHYSICAL THERAPY | Age: 86
Setting detail: THERAPIES SERIES
Discharge: HOME | End: 2021-03-11
Payer: MEDICARE

## 2021-03-11 PROCEDURE — 97110 THERAPEUTIC EXERCISES: CPT

## 2021-03-11 NOTE — FLOWSHEET NOTE
Physical Therapy Daily Treatment Note  Date: 3/11/2021    Patient Name:  Latrice Gill    :  1934  MRN: [id number]  Restrictions/Precautions:    Restrictions/Precautions  Restrictions/Precautions: (as tolerated)   Medical/Treatment Diagnosis Information:  · Diagnosis: M53.87 Sciatica associated with lumbosacral spine  · Treatment Diagnosis: R LE pain and weakness  Insurance/Certification information:     Insurance Allowable Visits:    Physician Information:     MD Follow-up Visit:   Plan of care signed (Y/N):    Visit# / total visits:   Pain level: 4-5/10 R hip/back    Progress Note Due (10 visits or 30 days, whichever is less):  3/36/59  Recertification Note Due (End of POC or 90 days, whichever is less):      Subjective: Presents to clinic using rollator as instructed. Reports 4-5/10 pain in R hip and B knees and reports muscle soreness following last visit due to riding bike. R back/hip pain remains a 3/10 with use of rollator. Has an appt with Dr Chris Noland on Friday for R hip pain    Pain Screening  Patient Currently in Pain: Yes  Pain Assessment  Pain Assessment: 0-10  Pain Level: 7  Patient's Stated Pain Goal: No pain  Pain Type: Chronic pain  Pain Location: Hip  Pain Orientation: Right  Pain Radiating Towards: anterior thigh  Pain Descriptors: Aching  Pain Frequency: Intermittent     Objective:  Observation: Palpation: tenderness over L4-5 spinous processes, and R quadratus area   Observation:  antalgic limp  and compensated movement with transitional movements   Test measurements:    PROM RLE (degrees)  RLE General PROM: (supine) hip flex 100, hip ext-10  neutral, abd 26, add 10 (pain), R knee -10- 120  PROM LLE (degrees)  LLE General PROM: (supine) hip flex 110,ext neutral, abd 32 deg(w/overpressure),add 20, knee -  Lumbar: forward flex 18\" finger tip to floor, ext 0, R SB 1\" fingertip to suprapatellar border, R rotation 0(turns head only).  L SB 1.5\" to suprapatellar border, L rotation 0(turns head only)    21: lumbar mobility forward flexion 10\" finger tips to floor, LSB 25,RSB 30, ext is minimal, rotation R 20, L rot 10    Strength RLE  Comment: hip flex 3+, abd 3, ext 3,quad 5/5 ,HS 3,df/pf 3  Flexibility: B HS --32 (90/90)  Special Tests: TU sec, Thirty sec sit to stand: 3    21: Thirty sec sit to stand: 9, hip flex 3/5, ext 3-  : Thirty sec sit to stand: 12  3/1/21: TUG 16 sec  3/8: TUG: 15 sec with rollator         Thirty sec sit to stand: 12         Amb 400' with rollator with good gt components and normal functional speed(just under 4 min)        3/11: lumbar ROM: flexion fingertips to mid shin(tight hamstrings), ext WNL, RSB 35 deg(pain), LSB 45 deg, L rotation [facility] w/exception of R rotation 50% limited (pain)  R hip flexion 105, extension -10, abd 25. R knee -10 - 130. Exercises, Neuro Facilitation & Gait Training (93809, [phone]): Activity Resistance/Repetitions Other comments        Post pelvic tilts 10 Cues for pelvic movement    1 min x 2    ITB stretch 30 sec-1 min    Trunk rotations 30 sec x 3 ea    Piriformis stretch passive     passive    QS,GS,HS,AS,LAQ,SAQ,SLR,[id number] -[id number]    30 sec-1 min      HEP     HEP    long leg pulls/lateral hip distraction passive    Hamstring stretch 30 sec holds x 3         Bike  X 7 min, 2.0      Therapeutic Activities (21718): Adjusted rollator to proper height.     Home Exercise Program:  Instructed in final HEP with written program present in home    Manual Treatments (.[phone]): R long leg pull/distraction(0/45 deg)  x 5 min, lateral hip distraction 30 sec hold x 4,     Modalities:  Timed Code Treatment Minutes:   TE x 55    Total Treatment Minutes:  55 min    Treatment/Activity Tolerance:  [x] Patient tolerated treatment well [] Patient limited by fatigue  [] Patient limited by pain  [] Patient limited by other medical complications  [] Other:     Assessment : R hip pain 0/10 with long leg pull/distraction (partially relieved with lateral distraction). Pt is demonstrating ROM throughout back and hip WFL, strength improved with pt I with ADLs and lifting leg in/out of car, and functional ambulation improved with use of AD. Pt has met all goals except TUG Score improved from 23 sec to 15(13 was the goal). Pt has completed all visits per POC with probable max potential reached pending MD visit Friday. Pt demonstrates good safety awareness with new AD and is I with HEP with probable dc. Will f/u with pt following MD visit.     Prognosis: [] Good [x] Fair  [] Poor    Patient Requires Follow-up: [x] Yes  [] No    Goals:  Short term goals  Time Frame for Short term goals: 8 weeks  Short term goal 1: Improve R hip ROM to [facility] to enable normal functional mobility with ADLs ie donning/doffing shoes and pants- met 3/8  Short term goal 2: Improve ROM lumbar spine to [facility] for normal functional mobility- met 3/11  Short term goal 3: Improve R hip strength by 50% to enable pt to I lift leg into car and perform sit to stand transfers with mild compensated movements- met 3/8  Short term goal 4: decrease pain to 5/10 with walking and 2/10 with transitional movements- met 1/27  Short term goal 5: demonstrate I HEP- met 3/11  Long term goals  Time Frame for Long term goals : 8 weeks  Long term goal 1: Improve Thirty second sit to stand from 3 to 10- met 2/22  Long term goal 2: Improve TUG score from 23 sec to 13 to decrease risk of falls- partially met,   Long term goal 3: pt will resume I ambulation demonstrating good gt components throughout home and community with good functional speed- met 3/8  Long term goal 4: Decrease pain to 3/10 with walking community distances and 0-1 with transitional movements- met 3/11    Plan:   Visits per week:  2  # of weeks:  8    [x] Continue per plan of care [] Alter current plan (see comments)  [] Plan of care initiated [] Hold pending MD visit [] Discharge    Plan for Next Session:  Dc this date pending MD visit Friday    Electronically signed by:  Mile Arellano

## 2021-03-12 ENCOUNTER — OFFICE VISIT (OUTPATIENT)
Dept: ORTHOPEDIC SURGERY | Age: 86
End: 2021-03-12
Payer: MEDICARE

## 2021-03-12 DIAGNOSIS — N18.32 STAGE 3B CHRONIC KIDNEY DISEASE (HCC): ICD-10-CM

## 2021-03-12 DIAGNOSIS — M16.11 PRIMARY OSTEOARTHRITIS OF RIGHT HIP: ICD-10-CM

## 2021-03-12 DIAGNOSIS — M25.551 RIGHT HIP PAIN: Primary | ICD-10-CM

## 2021-03-12 PROCEDURE — 1123F ACP DISCUSS/DSCN MKR DOCD: CPT | Performed by: ORTHOPAEDIC SURGERY

## 2021-03-12 PROCEDURE — 4040F PNEUMOC VAC/ADMIN/RCVD: CPT | Performed by: ORTHOPAEDIC SURGERY

## 2021-03-12 PROCEDURE — G8417 CALC BMI ABV UP PARAM F/U: HCPCS | Performed by: ORTHOPAEDIC SURGERY

## 2021-03-12 PROCEDURE — 99214 OFFICE O/P EST MOD 30 MIN: CPT | Performed by: ORTHOPAEDIC SURGERY

## 2021-03-12 PROCEDURE — G8427 DOCREV CUR MEDS BY ELIG CLIN: HCPCS | Performed by: ORTHOPAEDIC SURGERY

## 2021-03-12 PROCEDURE — G8484 FLU IMMUNIZE NO ADMIN: HCPCS | Performed by: ORTHOPAEDIC SURGERY

## 2021-03-12 PROCEDURE — 1036F TOBACCO NON-USER: CPT | Performed by: ORTHOPAEDIC SURGERY

## 2021-03-12 NOTE — PROGRESS NOTES
SURGERY  2/3/2005    TUNA    UPPER GASTROINTESTINAL ENDOSCOPY  1997    Gastritis    UPPER GASTROINTESTINAL ENDOSCOPY  5/2012    Esophageal dilatation    UPPER GASTROINTESTINAL ENDOSCOPY  12/10/2012    Esophageal dilatation    UPPER GASTROINTESTINAL ENDOSCOPY  4/19/2013    Esophageal dilatation     Social History     Tobacco Use    Smoking status: Never Smoker    Smokeless tobacco: Never Used   Substance Use Topics    Alcohol use: No      Current Outpatient Medications on File Prior to Visit   Medication Sig Dispense Refill    pravastatin (PRAVACHOL) 40 MG tablet TAKE 1 TABLET BY MOUTH AT BEDTIME 90 tablet 3    SYNTHROID 88 MCG tablet TAKE 1 TABLET BY MOUTH DAILY 90 tablet 0    digoxin (LANOXIN) 125 MCG tablet Take 125 mcg by mouth every other day      escitalopram (LEXAPRO) 10 MG tablet TAKE 1 TABLET BY MOUTH DAILY 90 tablet 0    docusate sodium (DOK) 100 MG capsule TAKE 1 CAPSULE BY MOUTH TWICE DAILY 60 capsule 0    pantoprazole (PROTONIX) 40 MG tablet TAKE 1 TABLET BY MOUTH EVERY MORNING BEFORE BREAKFAST 90 tablet 1    clobetasol (TEMOVATE) 0.05 % cream Apply twice a day as needed. 60 g 3    ramipril (ALTACE) 10 MG capsule TAKE 1 CAPSULE BY MOUTH DAILY 90 capsule 0    tamsulosin (FLOMAX) 0.4 MG capsule Take 0.8 mg by mouth daily       flecainide (TAMBOCOR) 100 MG tablet Take 100 mg by mouth 2 times daily.  aspirin 81 MG tablet Take 81 mg by mouth daily.  therapeutic multivitamin-minerals (THERAGRAN-M) tablet Take 1 tablet by mouth daily. No current facility-administered medications on file prior to visit. ASCVD 10-YEAR RISK SCORE  The ASCVD Risk score (Chintan Barber., et al., 2013) failed to calculate for the following reasons: The 2013 ASCVD risk score is only valid for ages 36 to 78   . Review of Systems  10-point ROS is negative other than HPI. Physical Exam  Based off 1997 Exam Criteria  There were no vitals taken for this visit.      Constitutional:       General: He is not in acute distress. Appearance: Normal appearance. Cardiovascular:      Rate and Rhythm: Normal rate and regular rhythm. Pulses: Normal pulses. Pulmonary:      Effort: Pulmonary effort is normal. No respiratory distress. Neurological:      Mental Status: He is alert and oriented to person, place, and time. Mental status is at baseline.      Musculoskeletal:  Gait:  antalgic    Spine / Hip Exam:      RIGHT  LEFT    Lumbar Spine Exam  [x] All Neg    [x] All Neg     Straight leg raise  []  []Not tested   []  []Not tested    Clonus  []  []Not tested   []  []Not tested    Pain with motion  []  []Not tested   []  []Not tested    Radiculopathy  []  []Not tested   []  []Not tested    Paraspinal muscle tenderness  [] Paraspinal  []Midline   [] Paraspinal  []Midline   Sensation RIGHT  LEFT    L3  [x] Normal []Decreased    [x] Normal []Decreased   L4  [x] Normal  []Decreased   [x] Normal []Decreased   L5  [x] Normal []Decreased   [x] Normal []Decreased   S1  [x] Normal  []Decreased   [x] Normal []Decreased   Pelvis       Scoliosis  [x] Nml  [] Present     Leg-length discrepency  [x] Equal  [] Right longer   [] Left longer   Range of Motion Active Passive Active Passive   Hip Flexion 80  120    Abduction 20  50    External Rotation @ 90 flex 0  65    Internal Rotation @ 90 flex 10  20           Hip Impingement / Dysplasia  [] All Neg  [] Not tested   [x] All Neg  [] Not tested    Hip impingement test  [x]  []Not tested   []  []Not tested    C-sign  [x]  []Not tested   []  []Not tested    Anterior instability apprehension  []  []Not tested   []  []Not tested    Posterior instability apprehension  []  []Not tested   []  []Not tested    Uncontained Internal rotation  []  []Not tested  []  []Not tested          Abductors  [x] All Neg  [] Not tested   [x] All Neg  [] Not tested    Medius strength  []  []Not tested   []  []Not tested    Minimum strength  []  []Not tested   []  []Not tested    IT band tendonitis tolerated, and physical therapy. If these are not effective, cortisone injection can be considered. We discussed surgical options as well, should conservative measures fail. Electronically signed by Juanito Brown MD on 3/12/2021 at 1:31 PM  This dictation was generated by voice recognition computer software. Although all attempts are made to edit the dictation for accuracy, there may be errors in the transcription that are not intended.

## 2021-03-15 ENCOUNTER — APPOINTMENT (OUTPATIENT)
Dept: PHYSICAL THERAPY | Age: 86
End: 2021-03-15
Payer: MEDICARE

## 2021-03-18 ENCOUNTER — APPOINTMENT (OUTPATIENT)
Dept: PHYSICAL THERAPY | Age: 86
End: 2021-03-18
Payer: MEDICARE

## 2021-03-30 NOTE — PROGRESS NOTES
Physical Therapy        Outpatient Physical Therapy Phone: [phone] Fax: [phone]    Discharge Date: 3/11/21    To: Jose Crowley     From: Helio Roque PT     Patient: Page Mi     : 1934 MRN: [id number]  Medical Diagnosis: M53.87 Sciatica associated with lumbosacral spine  Treatment Diagnosis: R LE pain and weakness    Co-morbidities/Complexities (which will affect course of rehabilitation):   []None           Arthritic conditions   []Rheumatoid arthritis (M05.9)  [x]Osteoarthritis (M19.91)   Cardiovascular conditions   [x]Hypertension (I10)  [x]Hyperlipidemia (E78.5)  []Angina pectoris (I20)  []Atherosclerosis (I70)   Musculoskeletal conditions   [x]Disc pathology   []Congenital spine pathologies   []Prior surgical intervention  []Osteoporosis (M81.8)  []Osteopenia (M85.8)   Endocrine conditions   [x]Hypothyroid (E03.9)  []Hyperthyroid Gastrointestinal conditions   []Constipation (R96.14)   Metabolic conditions   []Morbid obesity (E66.01)  []Diabetes type 1(E10.65) or 2 (E11.65)   []Neuropathy (G60.9)     Pulmonary conditions   []Asthma (J45)  []Coughing   []COPD (J44.9)   Psychological Disorders  []Anxiety (F41.9)  []Depression (F32.9)   []Other:   [x]Other:     afib     Time Period for Report:  12/15/20-3/11/21    Total Visits to date:  12    Cancels/No-shows to date: 0    Plan of Care/Treatment to date:  [x] Therapeutic Exercise  [x] Therapeutic Activity   [x] Gait Training  [x] Neuromuscular Re-education  [x] Manual Therapy  [x] Modalities:         [] Ultrasound  [] Electrical Stimulation            [] Iontophoresis [] Mechanical Traction       Comments:    [] Pt did not adhere to Plan of Care/did not return for follow-up visits.      Pain (Eval) 8/10   Pain (D/C) 3-5         Functional Outcome used:  ( Mod Oswestry Q4 filled out only)  Functional Outcome (Eval) 3/5   Functional Outcome (D/C) 2/5     Surgical []  Conservative [x]      Other significant findings at last visit:  3/11: lumbar ROM: flexion fingertips to mid shin(tight hamstrings), ext WNL, RSB 35 deg(pain), LSB 45 deg, L rotation WFL w/exception of R rotation 50% limited (pain)  R hip flexion 105, extension -10, abd 25. R knee -   TUG: 15 sec with rollator         Thirty sec sit to stand: 12         Amb 400' with rollator with good gt components and normal functional speed(just under 4 min)  Pt was referred to PT for LBP with sciatica/R LE hip pain. He was treated 16 visits for low back with ongoing pain in R hip and was referred to ortho to r/o hip DJD. Pt ROM throughout low back is WFL, R hip limited by joint pain, pain relieved with joint distraction. Pt has been issued and instructed in use of rollator with significant improvement in pain and functional mobility as well as overall safety. Pt reports being significantly better following PT and will f/u with ortho for ongoing issues with R hip pain.     Progress towards goals:  (All goals met except TUG score improved from 23 sec to 15 sec with goal being 13 sec.)     Short term goals  Time Frame for Short term goals: 8 weeks  Short term goal 1: Improve R hip ROM to [facility] to enable normal functional mobility with ADLs ie donning/doffing shoes and pants- met 3/8  Short term goal 2: Improve ROM lumbar spine to [facility] for normal functional mobility- met 3/11  Short term goal 3: Improve R hip strength by 50% to enable pt to I lift leg into car and perform sit to stand transfers with mild compensated movements- met 3/8  Short term goal 4: decrease pain to 5/10 with walking and 2/10 with transitional movements- met 1/27  Short term goal 5: demonstrate I HEP- met 3/11  Long term goals  Time Frame for Long term goals : 8 weeks  Long term goal 1: Improve Thirty second sit to stand from 3 to 10- met 2/22  Long term goal 2: Improve TUG score from 23 sec to 13 to decrease risk of falls- partially met,   Long term goal 3: pt will resume I ambulation demonstrating good gt components throughout home and community with good functional speed- met 3/8  Long term goal 4: Decrease pain to 3/10 with walking community distances and 0-1 with transitional movements- met 3/11      Goal Status:  [] Achieved [x] Partially Achieved  [] Not Achieved     Patient Status: [x] Patient now discharged      Electronically signed by:  Mita Mcnally, PT

## 2021-04-05 RX ORDER — ESCITALOPRAM OXALATE 10 MG/1
TABLET ORAL
Qty: 90 TABLET | Refills: 0 | Status: SHIPPED | OUTPATIENT
Start: 2021-04-05 | End: 2021-07-19 | Stop reason: SDUPTHER

## 2021-04-13 ENCOUNTER — TELEPHONE (OUTPATIENT)
Dept: ORTHOPEDIC SURGERY | Age: 86
End: 2021-04-13

## 2021-04-14 ENCOUNTER — OFFICE VISIT (OUTPATIENT)
Dept: INTERNAL MEDICINE CLINIC | Age: 86
End: 2021-04-14
Payer: MEDICARE

## 2021-04-14 VITALS
OXYGEN SATURATION: 98 % | SYSTOLIC BLOOD PRESSURE: 124 MMHG | DIASTOLIC BLOOD PRESSURE: 74 MMHG | BODY MASS INDEX: 29.06 KG/M2 | HEART RATE: 78 BPM | WEIGHT: 202.5 LBS

## 2021-04-14 DIAGNOSIS — R10.11 RUQ PAIN: Primary | ICD-10-CM

## 2021-04-14 DIAGNOSIS — R10.11 RIGHT UPPER QUADRANT PAIN: ICD-10-CM

## 2021-04-14 PROCEDURE — 1036F TOBACCO NON-USER: CPT | Performed by: NURSE PRACTITIONER

## 2021-04-14 PROCEDURE — 99213 OFFICE O/P EST LOW 20 MIN: CPT | Performed by: NURSE PRACTITIONER

## 2021-04-14 PROCEDURE — 4040F PNEUMOC VAC/ADMIN/RCVD: CPT | Performed by: NURSE PRACTITIONER

## 2021-04-14 PROCEDURE — 1123F ACP DISCUSS/DSCN MKR DOCD: CPT | Performed by: NURSE PRACTITIONER

## 2021-04-14 PROCEDURE — G8417 CALC BMI ABV UP PARAM F/U: HCPCS | Performed by: NURSE PRACTITIONER

## 2021-04-14 PROCEDURE — G8427 DOCREV CUR MEDS BY ELIG CLIN: HCPCS | Performed by: NURSE PRACTITIONER

## 2021-04-14 ASSESSMENT — PATIENT HEALTH QUESTIONNAIRE - PHQ9
2. FEELING DOWN, DEPRESSED OR HOPELESS: 0
1. LITTLE INTEREST OR PLEASURE IN DOING THINGS: 0
SUM OF ALL RESPONSES TO PHQ QUESTIONS 1-9: 0

## 2021-04-14 ASSESSMENT — ENCOUNTER SYMPTOMS
CONSTIPATION: 0
SINUS PRESSURE: 0
COLOR CHANGE: 0
DIARRHEA: 0
SHORTNESS OF BREATH: 0
SINUS PAIN: 0
BACK PAIN: 0
ABDOMINAL PAIN: 1
VOMITING: 0
COUGH: 0
NAUSEA: 0
WHEEZING: 0

## 2021-04-14 NOTE — PROGRESS NOTES
Miri Ramesh (:  1934) is a 80 y.o. male,Established patient, here for evaluation of the following chief complaint(s):  Pain (URQ area - started about 1 month)      ASSESSMENT/PLAN:  1. RUQ pain  -     US ABDOMEN COMPLETE; Future  2. Right upper quadrant pain  Assessment & Plan:  No fever, nausea, vomiting  Tenderness upon palpation  Obtain ultrasound  Further recs pending imaging  Call for worsening symptoms      No follow-ups on file. SUBJECTIVE/OBJECTIVE:  HPI   Patient is here for right upper quadrant pain that has been ongoing for 3 weeks. Pain is a dull achy pain. Getting worse in the last 3 weeks. Becoming more constant. Does not radiate. Denies fevers. Denies nausea and vomiting. Does not get worse with food. Denies change in bowel movements. Last bowel movement was yesterday. Denies dark or bloody bowel movements. Does have history of chronic constipation issues. Denies recent stool softener or laxative use. Had cholecystectomy in . Current Outpatient Medications   Medication Sig Dispense Refill    escitalopram (LEXAPRO) 10 MG tablet TAKE 1 TABLET BY MOUTH DAILY 90 tablet 0    pravastatin (PRAVACHOL) 40 MG tablet TAKE 1 TABLET BY MOUTH AT BEDTIME 90 tablet 3    SYNTHROID 88 MCG tablet TAKE 1 TABLET BY MOUTH DAILY 90 tablet 0    digoxin (LANOXIN) 125 MCG tablet Take 125 mcg by mouth every other day      docusate sodium (DOK) 100 MG capsule TAKE 1 CAPSULE BY MOUTH TWICE DAILY 60 capsule 0    pantoprazole (PROTONIX) 40 MG tablet TAKE 1 TABLET BY MOUTH EVERY MORNING BEFORE BREAKFAST 90 tablet 1    clobetasol (TEMOVATE) 0.05 % cream Apply twice a day as needed. 60 g 3    ramipril (ALTACE) 10 MG capsule TAKE 1 CAPSULE BY MOUTH DAILY 90 capsule 0    tamsulosin (FLOMAX) 0.4 MG capsule Take 0.8 mg by mouth daily       flecainide (TAMBOCOR) 100 MG tablet Take 100 mg by mouth 2 times daily.  aspirin 81 MG tablet Take 81 mg by mouth daily.       therapeutic multivitamin-minerals (THERAGRAN-M) tablet Take 1 tablet by mouth daily. No current facility-administered medications for this visit. Review of Systems   Constitutional: Negative for chills, fatigue and fever. HENT: Negative for congestion, sinus pressure and sinus pain. Respiratory: Negative for cough, shortness of breath and wheezing. Cardiovascular: Negative for chest pain and palpitations. Gastrointestinal: Positive for abdominal pain. Negative for constipation, diarrhea, nausea and vomiting. Musculoskeletal: Negative for arthralgias, back pain and myalgias. Skin: Negative for color change, pallor and rash. Neurological: Negative for dizziness, syncope, weakness, light-headedness and headaches. Psychiatric/Behavioral: Negative for behavioral problems, confusion and sleep disturbance. The patient is not nervous/anxious. Vitals:    04/14/21 1448   BP: 124/74   Site: Left Upper Arm   Position: Sitting   Cuff Size: Large Adult   Pulse: 78   SpO2: 98%   Weight: 202 lb 8 oz (91.9 kg)       Physical Exam  Constitutional:       Appearance: He is well-developed. HENT:      Head: Normocephalic and atraumatic. Eyes:      Conjunctiva/sclera: Conjunctivae normal.      Pupils: Pupils are equal, round, and reactive to light. Neck:      Musculoskeletal: Normal range of motion and neck supple. Thyroid: No thyromegaly. Vascular: No JVD. Cardiovascular:      Rate and Rhythm: Normal rate and regular rhythm. Heart sounds: Normal heart sounds. Pulmonary:      Effort: Pulmonary effort is normal. No respiratory distress. Breath sounds: Normal breath sounds. No wheezing. Abdominal:      General: Bowel sounds are normal. There is no distension. Palpations: Abdomen is soft. Tenderness: There is abdominal tenderness ( RUQ). Musculoskeletal: Normal range of motion. General: No deformity. Skin:     General: Skin is warm and dry.       Capillary Refill: Capillary refill takes less than 2 seconds. Neurological:      Mental Status: He is alert and oriented to person, place, and time. Psychiatric:         Behavior: Behavior normal.         On this date 4/14/2021 I have spent 20 minutes reviewing previous notes, test results and face to face with the patient discussing the diagnosis and importance of compliance with the treatment plan as well as documenting on the day of the visit. An electronic signature was used to authenticate this note.     --SOFY Menezes - CNP

## 2021-04-20 ENCOUNTER — HOSPITAL ENCOUNTER (OUTPATIENT)
Dept: ULTRASOUND IMAGING | Age: 86
Discharge: HOME | End: 2021-04-20
Payer: MEDICARE

## 2021-04-20 DIAGNOSIS — R10.11 RUQ PAIN: ICD-10-CM

## 2021-04-20 PROCEDURE — 76700 US EXAM ABDOM COMPLETE: CPT

## 2021-05-05 DIAGNOSIS — N18.30 STAGE 3 CHRONIC KIDNEY DISEASE, UNSPECIFIED WHETHER STAGE 3A OR 3B CKD (HCC): ICD-10-CM

## 2021-05-05 LAB
ALBUMIN SERPL-MCNC: 4.1 G/DL (ref 3.4–5)
ANION GAP SERPL CALCULATED.3IONS-SCNC: 11 MMOL/L (ref 3–16)
BUN BLDV-MCNC: 27 MG/DL (ref 7–20)
CALCIUM SERPL-MCNC: 8.7 MG/DL (ref 8.3–10.6)
CHLORIDE BLD-SCNC: 107 MMOL/L (ref 99–110)
CO2: 25 MMOL/L (ref 21–32)
CREAT SERPL-MCNC: 1.4 MG/DL (ref 0.8–1.3)
GFR AFRICAN AMERICAN: 58
GFR NON-AFRICAN AMERICAN: 48
GLUCOSE BLD-MCNC: 107 MG/DL (ref 70–99)
PHOSPHORUS: 3.4 MG/DL (ref 2.5–4.9)
POTASSIUM SERPL-SCNC: 4.7 MMOL/L (ref 3.5–5.1)
SODIUM BLD-SCNC: 143 MMOL/L (ref 136–145)

## 2021-05-25 RX ORDER — LEVOTHYROXINE SODIUM 88 MCG
TABLET ORAL
Qty: 90 TABLET | Refills: 0 | Status: SHIPPED | OUTPATIENT
Start: 2021-05-25 | End: 2021-09-10

## 2021-05-28 ENCOUNTER — TELEPHONE (OUTPATIENT)
Dept: INTERNAL MEDICINE CLINIC | Age: 86
End: 2021-05-28

## 2021-05-28 NOTE — TELEPHONE ENCOUNTER
Pt went to the [address] [place] [address] ED last week for possible afib and high BP  He was given metoprolol   He then saw his cardiologist Dr Doreen Ramesh doesn't want him to take metoprolol because it lowers his heart rate  BP this morning was  151/70  He says he doesn't have any SOB, chest pain, or headaches

## 2021-06-03 ENCOUNTER — OFFICE VISIT (OUTPATIENT)
Dept: INTERNAL MEDICINE CLINIC | Age: 86
End: 2021-06-03
Payer: MEDICARE

## 2021-06-03 VITALS
HEART RATE: 82 BPM | BODY MASS INDEX: 28.7 KG/M2 | OXYGEN SATURATION: 98 % | DIASTOLIC BLOOD PRESSURE: 70 MMHG | WEIGHT: 200 LBS | SYSTOLIC BLOOD PRESSURE: 116 MMHG

## 2021-06-03 DIAGNOSIS — M16.11 PRIMARY OSTEOARTHRITIS OF RIGHT HIP: ICD-10-CM

## 2021-06-03 DIAGNOSIS — I48.0 PAROXYSMAL ATRIAL FIBRILLATION (HCC): ICD-10-CM

## 2021-06-03 DIAGNOSIS — E78.00 PURE HYPERCHOLESTEROLEMIA: ICD-10-CM

## 2021-06-03 DIAGNOSIS — Z01.818 PREOP EXAMINATION: ICD-10-CM

## 2021-06-03 DIAGNOSIS — N18.32 STAGE 3B CHRONIC KIDNEY DISEASE (HCC): ICD-10-CM

## 2021-06-03 DIAGNOSIS — K22.70 BARRETT'S ESOPHAGUS WITHOUT DYSPLASIA: ICD-10-CM

## 2021-06-03 DIAGNOSIS — E03.9 ACQUIRED HYPOTHYROIDISM: ICD-10-CM

## 2021-06-03 DIAGNOSIS — I10 HTN (HYPERTENSION), BENIGN: ICD-10-CM

## 2021-06-03 PROCEDURE — 99214 OFFICE O/P EST MOD 30 MIN: CPT | Performed by: NURSE PRACTITIONER

## 2021-06-03 RX ORDER — PRAVASTATIN SODIUM 40 MG
TABLET ORAL
Qty: 90 TABLET | Refills: 3 | Status: SHIPPED | OUTPATIENT
Start: 2021-06-03 | End: 2022-03-07

## 2021-06-03 SDOH — ECONOMIC STABILITY: FOOD INSECURITY: WITHIN THE PAST 12 MONTHS, THE FOOD YOU BOUGHT JUST DIDN'T LAST AND YOU DIDN'T HAVE MONEY TO GET MORE.: NEVER TRUE

## 2021-06-03 SDOH — ECONOMIC STABILITY: TRANSPORTATION INSECURITY
IN THE PAST 12 MONTHS, HAS LACK OF TRANSPORTATION KEPT YOU FROM MEETINGS, WORK, OR FROM GETTING THINGS NEEDED FOR DAILY LIVING?: NO

## 2021-06-03 SDOH — ECONOMIC STABILITY: TRANSPORTATION INSECURITY
IN THE PAST 12 MONTHS, HAS THE LACK OF TRANSPORTATION KEPT YOU FROM MEDICAL APPOINTMENTS OR FROM GETTING MEDICATIONS?: NO

## 2021-06-03 SDOH — ECONOMIC STABILITY: FOOD INSECURITY: WITHIN THE PAST 12 MONTHS, YOU WORRIED THAT YOUR FOOD WOULD RUN OUT BEFORE YOU GOT MONEY TO BUY MORE.: NEVER TRUE

## 2021-06-03 ASSESSMENT — ENCOUNTER SYMPTOMS
ABDOMINAL PAIN: 0
CONSTIPATION: 0
SINUS PRESSURE: 0
SINUS PAIN: 0
SHORTNESS OF BREATH: 0
BACK PAIN: 0
COUGH: 0
WHEEZING: 0
COLOR CHANGE: 0
DIARRHEA: 0

## 2021-06-03 ASSESSMENT — SOCIAL DETERMINANTS OF HEALTH (SDOH): HOW HARD IS IT FOR YOU TO PAY FOR THE VERY BASICS LIKE FOOD, HOUSING, MEDICAL CARE, AND HEATING?: NOT HARD AT ALL

## 2021-06-03 NOTE — PROGRESS NOTES
Subjective:     Peter Javier presents to the office today for a preoperative consultation at the request of surgeon Dr. Franco Gutierrez who plans on performing RIGHT TOTAL HIP ARTHROPLASTY, ANTERIOR APPROACH on June 29, 2021. Planned anesthesia is General. The patient has the following known anesthesia issues: none  Patient has a bleeding risk of : no recent abnormal bleeding, no remote history of abnormal bleeding    Patient's medications, allergies, past medical, surgical,social and family histories were reviewed and updated as appropriate. Past Medical History:   Diagnosis Date    Achalasia     Cholelithiasis     Colon polyps     Elevated prostate specific antigen (PSA)     GERD (gastroesophageal reflux disease)     Gout     HTN (hypertension), benign     Hyperlipidemia     Hypothyroidism     Macular degeneration     Malignant neoplasm of parotid gland (HCC)     parotid glad cancer    Paroxysmal atrial fibrillation ([address]     Renal cyst, left     Sleep apnea 2012     Past Surgical History:   Procedure Laterality Date    CHOLECYSTECTOMY  2001    COLONOSCOPY  1991    Polyp    COLONOSCOPY  9/8/2005    Adenomatous polyp    COLONOSCOPY  11/6/2008    Adenomatous polyp    COLONOSCOPY  1/30/2012    Unremarkable    CYST REMOVAL  2005    Chest wall epidermal inclusion cyst    HYDROCELE EXCISION  1983    IRIDOTOMY / IRIDECTOMY  2006    Laser    PAROTIDECTOMY  12/4/2007    Left parotid for cancer    PROSTATE SURGERY  2/3/2005    TUNA    UPPER GASTROINTESTINAL ENDOSCOPY  1997    Gastritis    UPPER GASTROINTESTINAL ENDOSCOPY  5/2012    Esophageal dilatation    UPPER GASTROINTESTINAL ENDOSCOPY  12/10/2012    Esophageal dilatation    UPPER GASTROINTESTINAL ENDOSCOPY  4/19/2013    Esophageal dilatation     Social History     Socioeconomic History    Marital status:       Spouse name: Lyn Chikis Number of children: 2    Years of education: 12    Highest education level: Not on file   Occupational History    Occupation:      Comment: Retired   Tobacco Use    Smoking status: Never Smoker    Smokeless tobacco: Never Used   Vaping Use    Vaping Use: Never used   Substance and Sexual Activity    Alcohol use: No    Drug use: No    Sexual activity: Not on file   Other Topics Concern    Not on file   Social History Narrative    Not on file     Social Determinants of Health     Financial Resource Strain: Low Risk     Difficulty of Paying Living Expenses: Not hard at all   Food Insecurity: No Food Insecurity    Worried About [address] in the Last Year: Never true    [facility] in the Last Year: Never true   Transportation Needs: No Transportation Needs    Lack of Transportation (Medical): No    Lack of Transportation (Non-Medical): No   Physical Activity:     Days of Exercise per Week:     Minutes of Exercise per Session:    Stress:     Feeling of Stress :    Social Connections:     Frequency of Communication with Friends and Family:     Frequency of Social Gatherings with Friends and Family:     Attends Shinto Services:     Active Member of Clubs or Organizations:     Attends Club or Organization Meetings:     Marital Status:    Intimate Partner Violence:     Fear of Current or Ex-Partner:     Emotionally Abused:     Physically Abused:     Sexually Abused:        Review of Systems  Review of Systems   Constitutional: Negative for chills, fatigue and fever. HENT: Negative for congestion, sinus pressure and sinus pain. Respiratory: Negative for cough, shortness of breath and wheezing. Cardiovascular: Negative for chest pain and palpitations. Gastrointestinal: Negative for abdominal pain, constipation and diarrhea. Musculoskeletal: Positive for arthralgias (right hip). Negative for back pain and myalgias. Skin: Negative for color change, pallor and rash. Neurological: Negative for dizziness, syncope, weakness, light-headedness and headaches. Psychiatric/Behavioral: Negative for behavioral problems, confusion and sleep disturbance. The patient is not nervous/anxious. Objective:     Vitals:    06/03/21 1101   BP: 116/70   Pulse: 82   SpO2: 98%        Physical Exam  Physical Exam  Constitutional:       Appearance: He is well-developed. HENT:      Head: Normocephalic and atraumatic. Eyes:      Conjunctiva/sclera: Conjunctivae normal.      Pupils: Pupils are equal, round, and reactive to light. Neck:      Thyroid: No thyromegaly. Vascular: No JVD. Cardiovascular:      Rate and Rhythm: Normal rate and regular rhythm. Heart sounds: Normal heart sounds. Pulmonary:      Effort: Pulmonary effort is normal. No respiratory distress. Breath sounds: Normal breath sounds. No wheezing. Musculoskeletal:         General: No deformity. Normal range of motion. Cervical back: Normal range of motion and neck supple. Skin:     General: Skin is warm and dry. Capillary Refill: Capillary refill takes less than 2 seconds. Neurological:      Mental Status: He is alert and oriented to person, place, and time. Psychiatric:         Behavior: Behavior normal.         Cardiographics  ECG: Completed 5/26/21 per cardiologist. Sinus  Bradycardia. -Right bundle branch block with left axis -bifascicular block.      Lab Review   Lab Results   Component Value Date     05/05/2021    K 4.7 05/05/2021     05/05/2021    CO2 25 05/05/2021    BUN 27 05/05/2021    CREATININE 1.4 05/05/2021    GLUCOSE 107 05/05/2021    CALCIUM 8.7 05/05/2021     Lab Results   Component Value Date    WBC 8.4 09/17/2020    HGB 11.8 09/17/2020    HCT 34.9 09/17/2020    MCV 89.2 09/17/2020     09/17/2020         Medication Review  Current Outpatient Medications on File Prior to Visit   Medication Sig Dispense Refill    SYNTHROID 88 MCG tablet TAKE 1 TABLET BY MOUTH DAILY 90 tablet 0    escitalopram (LEXAPRO) 10 MG tablet TAKE 1 TABLET BY MOUTH DAILY 90 tablet 0    digoxin (LANOXIN) 125 MCG tablet Take 125 mcg by mouth every other day      docusate sodium (DOK) 100 MG capsule TAKE 1 CAPSULE BY MOUTH TWICE DAILY 60 capsule 0    pantoprazole (PROTONIX) 40 MG tablet TAKE 1 TABLET BY MOUTH EVERY MORNING BEFORE BREAKFAST 90 tablet 1    clobetasol (TEMOVATE) 0.05 % cream Apply twice a day as needed. 60 g 3    ramipril (ALTACE) 10 MG capsule TAKE 1 CAPSULE BY MOUTH DAILY 90 capsule 0    tamsulosin (FLOMAX) 0.4 MG capsule Take 0.8 mg by mouth daily       flecainide (TAMBOCOR) 100 MG tablet Take 100 mg by mouth 2 times daily.  aspirin 81 MG tablet Take 81 mg by mouth daily.  therapeutic multivitamin-minerals (THERAGRAN-M) tablet Take 1 tablet by mouth daily. No current facility-administered medications on file prior to visit. Assessment:       1. Acquired hypothyroidism    2. Kirk's esophagus without dysplasia    3. Stage 3b chronic kidney disease    4. Pure hypercholesterolemia    5. Paroxysmal atrial fibrillation (HCC)    6. Hypertension, benign    7. Primary osteoarthritis of right hip    8. 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  Stable, controlled   Continue PPI     Stage 3b chronic kidney disease  Following with nephrology   Stable, controlled   Labs reviewed     Pure hypercholesterolemia  Stable, controlled   Continue pravastatin     Paroxysmal atrial fibrillation  Stable, controlled   Following with cardiology   Planning for 24 holter this week   Regular rhythm in office   Not on anticoagulation   No CP, SOB, dizziness, palptiations    Hypertension, benign  Stable, controlled   Continue regimen    Primary osteoarthritis of right hip  Following with ortho  Surgery as planned    Preop examination  Perioperative risk related to the patient's upcoming surgery is considered low. he is cleared for surgery.   Pre-op exam was completed on 6/3/21 11:23 AM.   - Review ECG   - Reviewed labs           - Preoperative workup as follows none  - Change in medication regimen before surgery: discontinue ASA 7d before surgery  - Prophylaxisfor cardiac events with perioperative beta-blockers: should be considered, specific regimen per anesthesia

## 2021-06-03 NOTE — ASSESSMENT & PLAN NOTE
Stable, controlled   Following with cardiology   Planning for 24 holter this week   Regular rhythm in office   Not on anticoagulation   No CP, SOB, dizziness, palptiations

## 2021-06-03 NOTE — ASSESSMENT & PLAN NOTE
Perioperative risk related to the patient's upcoming surgery is considered low. he is cleared for surgery.   Pre-op exam was completed on 6/3/21 11:23 AM.   - Review ECG   - Reviewed labs

## 2021-07-03 PROBLEM — Z01.818 PREOP EXAMINATION: Status: RESOLVED | Noted: 2021-06-03 | Resolved: 2021-07-03

## 2021-07-19 ENCOUNTER — TELEPHONE (OUTPATIENT)
Dept: INTERNAL MEDICINE CLINIC | Age: 86
End: 2021-07-19

## 2021-07-19 RX ORDER — ESCITALOPRAM OXALATE 10 MG/1
TABLET ORAL
Qty: 90 TABLET | Refills: 0 | Status: SHIPPED | OUTPATIENT
Start: 2021-07-19 | End: 2021-10-26

## 2021-07-19 NOTE — TELEPHONE ENCOUNTER
Patient requesting a medication refill.   Medication escitalopram (LEXAPRO)   Dosage 10 MG tablet   FrequencyTAKE 1 TABLET BY MOUTH DAILY  Last filled on 4/5/21  [facility] [address] [place] [address] [phone] - F [phone]

## 2021-09-10 RX ORDER — LEVOTHYROXINE SODIUM 88 MCG
TABLET ORAL
Qty: 90 TABLET | Refills: 0 | Status: SHIPPED | OUTPATIENT
Start: 2021-09-10 | End: 2021-12-16 | Stop reason: SDUPTHER

## 2021-09-23 ENCOUNTER — OFFICE VISIT (OUTPATIENT)
Dept: INTERNAL MEDICINE CLINIC | Age: 86
End: 2021-09-23
Payer: MEDICARE

## 2021-09-23 VITALS
OXYGEN SATURATION: 98 % | HEIGHT: 69 IN | WEIGHT: 201 LBS | SYSTOLIC BLOOD PRESSURE: 138 MMHG | BODY MASS INDEX: 29.77 KG/M2 | DIASTOLIC BLOOD PRESSURE: 68 MMHG | HEART RATE: 72 BPM

## 2021-09-23 DIAGNOSIS — E03.9 ACQUIRED HYPOTHYROIDISM: ICD-10-CM

## 2021-09-23 DIAGNOSIS — I48.0 PAROXYSMAL ATRIAL FIBRILLATION (HCC): ICD-10-CM

## 2021-09-23 DIAGNOSIS — Z00.00 ROUTINE GENERAL MEDICAL EXAMINATION AT A HEALTH CARE FACILITY: Primary | ICD-10-CM

## 2021-09-23 DIAGNOSIS — K22.70 BARRETT'S ESOPHAGUS WITHOUT DYSPLASIA: ICD-10-CM

## 2021-09-23 DIAGNOSIS — I10 HTN (HYPERTENSION), BENIGN: ICD-10-CM

## 2021-09-23 PROCEDURE — G0008 ADMIN INFLUENZA VIRUS VAC: HCPCS | Performed by: NURSE PRACTITIONER

## 2021-09-23 PROCEDURE — 90694 VACC AIIV4 NO PRSRV 0.5ML IM: CPT | Performed by: NURSE PRACTITIONER

## 2021-09-23 PROCEDURE — G0439 PPPS, SUBSEQ VISIT: HCPCS | Performed by: NURSE PRACTITIONER

## 2021-09-23 ASSESSMENT — ENCOUNTER SYMPTOMS
COLOR CHANGE: 0
ABDOMINAL PAIN: 0
COUGH: 0
SINUS PAIN: 0
SINUS PRESSURE: 0
BACK PAIN: 0
DIARRHEA: 0
WHEEZING: 0
SHORTNESS OF BREATH: 0
CONSTIPATION: 0

## 2021-09-23 ASSESSMENT — PATIENT HEALTH QUESTIONNAIRE - PHQ9
SUM OF ALL RESPONSES TO PHQ QUESTIONS 1-9: 0
1. LITTLE INTEREST OR PLEASURE IN DOING THINGS: 0
SUM OF ALL RESPONSES TO PHQ9 QUESTIONS 1 & 2: 0
SUM OF ALL RESPONSES TO PHQ QUESTIONS 1-9: 0
SUM OF ALL RESPONSES TO PHQ QUESTIONS 1-9: 0
2. FEELING DOWN, DEPRESSED OR HOPELESS: 0

## 2021-09-23 ASSESSMENT — LIFESTYLE VARIABLES
AUDIT-C TOTAL SCORE: INCOMPLETE
AUDIT TOTAL SCORE: INCOMPLETE
HOW OFTEN DO YOU HAVE A DRINK CONTAINING ALCOHOL: 0
HOW OFTEN DO YOU HAVE A DRINK CONTAINING ALCOHOL: NEVER

## 2021-09-23 NOTE — PROGRESS NOTES
Medicare Annual Wellness Visit  Name: Adarsh Ambrosio Date: 2021   MRN: [id number] Sex: Male   Age: 80 y.o. Ethnicity: Non- / Non    : 1934 Race: White (non-)      Jaquelin Wasserman is here for Medicare AWV    Screenings for behavioral, psychosocial and functional/safety risks, and cognitive dysfunction are all negative except as indicated below. These results, as well as other patient data from the [address] form, are documented in Flowsheets linked to this Encounter. Feeling well overall. He is doing well after his hip replacement. Completed PT. He is ambulating much better. Following with nephrology and cardiology. He is struggling with dyspepsia. He is due for follow up EGD. Compliant with protonix. Allergies   Allergen Reactions    Ciprofloxacin Hives    Methylprednisolone Other (See Comments)     Dizzy         Prior to Visit Medications    Medication Sig Taking? Authorizing Provider   SYNTHROID 88 MCG tablet TAKE 1 TABLET BY MOUTH DAILY Yes SOFY Prado CNP   escitalopram (LEXAPRO) 10 MG tablet TAKE 1 TABLET BY MOUTH DAILY Yes SOFY Prado CNP   pravastatin (PRAVACHOL) 40 MG tablet TAKE 1 TABLET BY MOUTH AT BEDTIME Yes SOFY Prado CNP   digoxin (LANOXIN) 125 MCG tablet Take 125 mcg by mouth every other day Yes Historical Provider, MD   pantoprazole (PROTONIX) 40 MG tablet TAKE 1 TABLET BY MOUTH EVERY MORNING BEFORE BREAKFAST Yes Kelly Crowe MD   clobetasol (TEMOVATE) 0.05 % cream Apply twice a day as needed. Yes Kelly Crowe MD   ramipril (ALTACE) 10 MG capsule TAKE 1 CAPSULE BY MOUTH DAILY Yes SOFY Wallace CNP   tamsulosin (FLOMAX) 0.4 MG capsule Take 0.8 mg by mouth daily  Yes Historical Provider, MD   flecainide (TAMBOCOR) 100 MG tablet Take 100 mg by mouth 2 times daily. Yes Historical Provider, MD   aspirin 81 MG tablet Take 81 mg by mouth daily.  Yes Historical Provider, MD (90.7 kg)     Vitals:    09/23/21 1055 09/23/21 1104   BP: (!) 172/70 138/68   Site: Left Upper Arm Left Upper Arm   Position: Sitting Sitting   Cuff Size: Large Adult Large Adult   Pulse: 72    SpO2: 98%    Weight: 201 lb (91.2 kg)    Height: 5' 9\" (1.753 m)      Body mass index is 29.68 kg/m². Based upon direct observation of the patient, evaluation of cognition reveals recent and remote memory intact. Review of Systems   Constitutional: Negative for chills, fatigue and fever. HENT: Negative for congestion, sinus pressure and sinus pain. Respiratory: Negative for cough, shortness of breath and wheezing. Cardiovascular: Negative for chest pain and palpitations. Gastrointestinal: Negative for abdominal pain, constipation and diarrhea. Dyspepsia   Musculoskeletal: Negative for arthralgias, back pain and myalgias. Skin: Negative for color change, pallor and rash. Neurological: Negative for dizziness, syncope, weakness, light-headedness and headaches. Psychiatric/Behavioral: Negative for behavioral problems, confusion and sleep disturbance. The patient is not nervous/anxious. Physical Exam  Constitutional:       Appearance: He is well-developed. HENT:      Head: Normocephalic and atraumatic. Right Ear: Tympanic membrane normal.      Left Ear: Tympanic membrane normal.      Mouth/Throat:      Mouth: Mucous membranes are moist.      Pharynx: Oropharynx is clear. Eyes:      Conjunctiva/sclera: Conjunctivae normal.      Pupils: Pupils are equal, round, and reactive to light. Neck:      Thyroid: No thyromegaly. Vascular: No JVD. Cardiovascular:      Rate and Rhythm: Normal rate and regular rhythm. Heart sounds: Normal heart sounds. Pulmonary:      Effort: Pulmonary effort is normal. No respiratory distress. Breath sounds: Normal breath sounds. No wheezing. Abdominal:      General: Bowel sounds are normal. There is no distension.       Palpations: Abdomen is soft.      Tenderness: There is no abdominal tenderness. Musculoskeletal:         General: No deformity. Normal range of motion. Cervical back: Normal range of motion and neck supple. Skin:     General: Skin is warm and dry. Capillary Refill: Capillary refill takes less than 2 seconds. Neurological:      Mental Status: He is alert and oriented to person, place, and time. Psychiatric:         Mood and Affect: Mood normal.         Behavior: Behavior normal.          Patient's complete Health Risk Assessment and screening values have been reviewed and are found in Flowsheets. The following problems were reviewed today and where indicated follow up appointments were made and/or referrals ordered. Positive Risk Factor Screenings with Interventions:     Fall Risk:  2 or more falls in past year?: no  Fall with injury in past year?: (!) yes  Fall Risk Interventions:    · Home safety tips provided          General Health and ACP:  General  In general, how would you say your health is?: Good  In the past 7 days, have you experienced any of the following?  New or Increased Pain, New or Increased Fatigue, Loneliness, Social Isolation, Stress or Anger?: None of These  Do you get the social and emotional support that you need?: Yes  Do you have a Living Will?: Yes  Advance Directives     Power of [address] [place] [address] Will ACP-Advance Directive ACP-Power of     Not on File Not on File Not on File Not on File      General Health Risk Interventions:  · none    Health Habits/Nutrition:  Health Habits/Nutrition  Do you exercise for at least 20 minutes 2-3 times per week?: Yes  Have you lost any weight without trying in the past 3 months?: (!) Yes  Do you eat only one meal per day?: No  Have you seen the dentist within the past year?: Appointment is scheduled  Body mass index: (!) 29.68  Health Habits/Nutrition Interventions:  · Nutritional issues:  educational materials for healthy, well-balanced diet provided    Hearing/Vision:  No exam data present  Hearing/Vision  Do you or your family notice any trouble with your hearing that hasn't been managed with hearing aids?: No  Do you have difficulty driving, watching TV, or doing any of your daily activities because of your eyesight?: (!) Yes  Have you had an eye exam within the past year?: Yes  Hearing/Vision Interventions:  · Vision concerns:  patient encouraged to make appointment with his/her eye specialist      Personalized Preventive Plan   Current Health Maintenance Status  Immunization History   Administered Date(s) Administered    COVID-19, Garcia Peter, PF, 30mcg/0.3mL 01/21/2021, 02/11/2021    Influenza 11/04/2013    Influenza Vaccine, unspecified formulation 11/04/2013, 09/01/2015, 09/01/2016    Influenza Virus Vaccine 10/10/2009, 10/19/2010, 10/15/2011, 10/15/2012, 10/15/2014    Influenza Whole 10/15/2014    Influenza, High Dose (Fluzone 65 yrs and older) 09/01/2015, 09/01/2016, 11/06/2017, 09/21/2018    Influenza, Quadv, adjuvanted, 65 yrs +, IM, PF (Fluad) 09/17/2020, 09/23/2021    Influenza, Triv, inactivated, subunit, adjuvanted, IM (Fluad 65 yrs and older) 11/11/2019    Pneumococcal Conjugate 13-valent ([id number]) 05/26/2015    Pneumococcal Polysaccharide ([id number]) 08/22/2000    Td, unspecified formulation 06/23/2004    Tdap (Boostrix, Adacel) 02/12/2014    Zoster Live (Zostavax) 10/15/2014    Zoster Recombinant (Shingrix) 10/21/2019, 12/27/2019        Health Maintenance   Topic Date Due    Annual Wellness Visit (AWV)  Never done    TSH testing  09/17/2021    Lipid screen  09/17/2021    PSA counseling  09/17/2021    Potassium monitoring  09/08/2022    Creatinine monitoring  09/08/2022    DTaP/Tdap/Td vaccine (2 - Td or Tdap) 02/12/2024    Flu vaccine  Completed    Shingles Vaccine  Completed    Pneumococcal 65+ years Vaccine  Completed    COVID-19 Vaccine  Completed    Hepatitis A vaccine  Aged Out    Hepatitis B vaccine Aged Out    Hib vaccine  Aged Out    Meningococcal (ACWY) vaccine  Aged Out     Recommendations for [place] Due: see orders and patient instructions/AVS.  . Recommended screening schedule for the next 5-10 years is provided to the patient in written form: see Patient Instructions/AVS.      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

## 2021-09-23 NOTE — PATIENT INSTRUCTIONS
Personalized Preventive Plan for Chacha Cotter - 9/23/2021  Medicare offers a range of preventive health benefits. Some of the tests and screenings are paid in full while other may be subject to a deductible, co-insurance, and/or copay. Some of these benefits include a comprehensive review of your medical history including lifestyle, illnesses that may run in your family, and various assessments and screenings as appropriate. After reviewing your medical record and screening and assessments performed today your provider may have ordered immunizations, labs, imaging, and/or referrals for you. A list of these orders (if applicable) as well as your Preventive Care list are included within your After Visit Summary for your review. Other Preventive Recommendations:    · A preventive eye exam performed by an eye specialist is recommended every 1-2 years to screen for glaucoma; cataracts, macular degeneration, and other eye disorders. · A preventive dental visit is recommended every 6 months. · Try to get at least 150 minutes of exercise per week or 10,000 steps per day on a pedometer . · Order or download the FREE \"Exercise & Physical Activity: Your Everyday Guide\" from [employer]. Call 6-155.605.4061 or search [employer] online. · You need 7574-6691 mg of calcium and 0569-0521 IU of vitamin D per day. It is possible to meet your calcium requirement with diet alone, but a vitamin D supplement is usually necessary to meet this goal.  · When exposed to the sun, use a sunscreen that protects against both UVA and UVB radiation with an SPF of 30 or greater. Reapply every 2 to 3 hours or after sweating, drying off with a towel, or swimming. · Always wear a seat belt when traveling in a car. Always wear a helmet when riding a bicycle or motorcycle.

## 2021-10-18 ENCOUNTER — TELEPHONE (OUTPATIENT)
Dept: INTERNAL MEDICINE CLINIC | Age: 86
End: 2021-10-18

## 2021-10-18 RX ORDER — FAMOTIDINE 10 MG
10 TABLET ORAL 2 TIMES DAILY
Qty: 30 TABLET | Refills: 0 | Status: SHIPPED | OUTPATIENT
Start: 2021-10-18 | End: 2022-05-17

## 2021-10-18 NOTE — TELEPHONE ENCOUNTER
----- Message from Brigid Diaz sent at 10/18/2021  2:18 PM EDT -----  Subject: Message to Provider    QUESTIONS  Information for Provider? Pt called and stated that he needs to talk to   his . Rosario Mitchell was taking some Sulfa drugs. Pt states that Thursday he had a   rash on him , the drug store gave the pt OTC medication. Pt states that   his rash has got even worse now. Pt states that he also fell last night. Please call the pt back. ---------------------------------------------------------------------------  --------------  Abiola Lance INFO  What is the best way for the office to contact you? OK to leave message on   voicemail  Preferred Call Back Phone Number? [phone]  ---------------------------------------------------------------------------  --------------  SCRIPT ANSWERS  Relationship to Patient?  Self

## 2021-12-16 ENCOUNTER — TELEPHONE (OUTPATIENT)
Dept: INTERNAL MEDICINE CLINIC | Age: 86
End: 2021-12-16

## 2021-12-16 RX ORDER — LEVOTHYROXINE SODIUM 88 MCG
TABLET ORAL
Qty: 90 TABLET | Refills: 1 | Status: SHIPPED | OUTPATIENT
Start: 2021-12-16 | End: 2022-05-05

## 2021-12-16 NOTE — TELEPHONE ENCOUNTER
Patient requesting a medication refill.   Medication SYNTHROID  Dosage 88 MCG tablet   FrequencyTAKE 1 TABLET BY MOUTH DAILY  Last filled on 9/10/21  [facility] [address] [place] [address] 22, [address] [facility] - P [phone] - f [phone]

## 2022-01-13 DIAGNOSIS — N18.30 STAGE 3 CHRONIC KIDNEY DISEASE, UNSPECIFIED WHETHER STAGE 3A OR 3B CKD (HCC): ICD-10-CM

## 2022-01-13 LAB
ALBUMIN SERPL-MCNC: 4.2 G/DL (ref 3.4–5)
ANION GAP SERPL CALCULATED.3IONS-SCNC: 11 MMOL/L (ref 3–16)
BUN BLDV-MCNC: 29 MG/DL (ref 7–20)
CALCIUM SERPL-MCNC: 8.9 MG/DL (ref 8.3–10.6)
CHLORIDE BLD-SCNC: 103 MMOL/L (ref 99–110)
CO2: 25 MMOL/L (ref 21–32)
CREAT SERPL-MCNC: 1.5 MG/DL (ref 0.8–1.3)
CREATININE URINE: 91.5 MG/DL (ref 39–259)
GFR AFRICAN AMERICAN: 54
GFR NON-AFRICAN AMERICAN: 44
GLUCOSE BLD-MCNC: 94 MG/DL (ref 70–99)
MICROALBUMIN UR-MCNC: 3.6 MG/DL
MICROALBUMIN/CREAT UR-RTO: 39.3 MG/G (ref 0–30)
PHOSPHORUS: 3.4 MG/DL (ref 2.5–4.9)
POTASSIUM SERPL-SCNC: 4.5 MMOL/L (ref 3.5–5.1)
SODIUM BLD-SCNC: 139 MMOL/L (ref 136–145)

## 2022-03-07 RX ORDER — PRAVASTATIN SODIUM 40 MG
TABLET ORAL
Qty: 90 TABLET | Refills: 3 | Status: SHIPPED | OUTPATIENT
Start: 2022-03-07

## 2022-03-23 ENCOUNTER — OFFICE VISIT (OUTPATIENT)
Dept: FAMILY MEDICINE CLINIC | Age: 87
End: 2022-03-23
Payer: MEDICARE

## 2022-03-23 VITALS
WEIGHT: 205 LBS | OXYGEN SATURATION: 99 % | SYSTOLIC BLOOD PRESSURE: 138 MMHG | HEART RATE: 66 BPM | HEIGHT: 70 IN | TEMPERATURE: 96.9 F | BODY MASS INDEX: 29.35 KG/M2 | DIASTOLIC BLOOD PRESSURE: 62 MMHG

## 2022-03-23 DIAGNOSIS — Z00.00 ROUTINE GENERAL MEDICAL EXAMINATION AT A HEALTH CARE FACILITY: Primary | ICD-10-CM

## 2022-03-23 DIAGNOSIS — E78.00 PURE HYPERCHOLESTEROLEMIA: ICD-10-CM

## 2022-03-23 DIAGNOSIS — E03.9 ACQUIRED HYPOTHYROIDISM: ICD-10-CM

## 2022-03-23 DIAGNOSIS — F32.5 MAJOR DEPRESSIVE DISORDER WITH SINGLE EPISODE, IN FULL REMISSION (HCC): ICD-10-CM

## 2022-03-23 PROCEDURE — 99214 OFFICE O/P EST MOD 30 MIN: CPT | Performed by: NURSE PRACTITIONER

## 2022-03-23 ASSESSMENT — ENCOUNTER SYMPTOMS
RHINORRHEA: 0
SHORTNESS OF BREATH: 0
COLOR CHANGE: 0
TROUBLE SWALLOWING: 0
COUGH: 0
NAUSEA: 0
CHEST TIGHTNESS: 0
SORE THROAT: 0
ABDOMINAL PAIN: 0
SINUS PAIN: 0
WHEEZING: 0
VOMITING: 0
CONSTIPATION: 0
DIARRHEA: 0

## 2022-03-23 NOTE — PROGRESS NOTES
Jacy Hernández (:  1934) is a 80 y.o. male,Established patient, here for evaluation of the following chief complaint(s):  6 Month Follow-Up      ASSESSMENT/PLAN:  1. Routine general medical examination at a health care facility  Assessment & Plan:  Check labs  Addressed colonoscopy- last one 4 years ago with polyps. Pt to call insurance regarding colonoscopy coverage. No acute concerns. UTD on vaccines  Discussed diet and exercise  Orders:  -     CBC; Future  -     Comprehensive Metabolic Panel; Future  2. Pure hypercholesterolemia  Assessment & Plan:  Check labs  Continue current regimen  Orders:  -     Lipid Panel; Future  3. Acquired hypothyroidism  Assessment & Plan:  Check labs  Continue current regimen, complainant with medication  Orders:  -     TSH with Reflex; Future  4. Major depressive disorder with single episode, in full remission [facility])  Assessment & Plan:  Stable, controlled  Pt doing well on 10 mg lexapro daily  Continue current regimen      No follow-ups on file. SUBJECTIVE/OBJECTIVE:  HPI   Patient is here for 6 month follow up. He reports he is doing well overall and is compliant with his medications. This week, he was released by orthopedics s/p total hip replacement 9 months ago. He is followed by nephrology, urology, and cardiology. Recently finished a course of antibiotics for a UTI. Approximately 3 weeks ago, the patient bent over, could not stop himself, and hit his forehead on the ground. He denies LOC. He reports minor bruising from his glasses pressing into his face from the incident. The bruising has since resolved.      Current Outpatient Medications   Medication Sig Dispense Refill    pravastatin (PRAVACHOL) 40 MG tablet TAKE 1 TABLET BY MOUTH AT BEDTIME 90 tablet 3    SYNTHROID 88 MCG tablet TAKE 1 TABLET BY MOUTH DAILY 90 tablet 1    escitalopram (LEXAPRO) 10 MG tablet TAKE 1 TABLET BY MOUTH DAILY 90 tablet 1    digoxin (LANOXIN) 125 MCG tablet Take 125 mcg by mouth every other day      pantoprazole (PROTONIX) 40 MG tablet TAKE 1 TABLET BY MOUTH EVERY MORNING BEFORE BREAKFAST 90 tablet 1    clobetasol (TEMOVATE) 0.05 % cream Apply twice a day as needed. 60 g 3    ramipril (ALTACE) 10 MG capsule TAKE 1 CAPSULE BY MOUTH DAILY 90 capsule 0    tamsulosin (FLOMAX) 0.4 MG capsule Take 0.8 mg by mouth daily       flecainide (TAMBOCOR) 100 MG tablet Take 100 mg by mouth 2 times daily.  aspirin 81 MG tablet Take 81 mg by mouth daily.  therapeutic multivitamin-minerals (THERAGRAN-M) tablet Take 1 tablet by mouth daily.  famotidine (PEPCID) 10 MG tablet Take 1 tablet by mouth 2 times daily for 15 days 30 tablet 0     No current facility-administered medications for this visit. Review of Systems   Constitutional: Negative for activity change, chills, fatigue, fever and unexpected weight change. HENT: Negative for congestion, hearing loss, rhinorrhea, sinus pain, sore throat and trouble swallowing. Eyes: Negative for visual disturbance. Respiratory: Negative for cough, chest tightness, shortness of breath and wheezing. Cardiovascular: Negative for chest pain and leg swelling. Gastrointestinal: Negative for abdominal pain, constipation, diarrhea, nausea and vomiting. Genitourinary: Negative for difficulty urinating, frequency, hematuria and urgency. Musculoskeletal: Negative for arthralgias and gait problem. Skin: Negative for color change, rash and wound. Neurological: Negative for dizziness, tremors, seizures, syncope, facial asymmetry, speech difficulty and light-headedness. Psychiatric/Behavioral: Negative for agitation, behavioral problems and confusion. The patient is not nervous/anxious.         Vitals:    03/23/22 1028   BP: 138/62   Site: Left Upper Arm   Position: Sitting   Cuff Size: Large Adult   Pulse: 66   Temp: 96.9 °F (36.1 °C)   SpO2: 99%   Weight: 205 lb (93 kg)   Height: 5' 10\" (1.778 m)       Physical Exam  Constitutional: Appearance: He is well-developed. HENT:      Head: Normocephalic and atraumatic. Eyes:      Conjunctiva/sclera: Conjunctivae normal.      Pupils: Pupils are equal, round, and reactive to light. Neck:      Thyroid: No thyromegaly. Vascular: No JVD. Cardiovascular:      Rate and Rhythm: Normal rate and regular rhythm. Heart sounds: Normal heart sounds. Pulmonary:      Effort: Pulmonary effort is normal. No respiratory distress. Breath sounds: Normal breath sounds. No wheezing. Musculoskeletal:         General: No deformity. Normal range of motion. Cervical back: Normal range of motion and neck supple. Skin:     General: Skin is warm and dry. Capillary Refill: Capillary refill takes less than 2 seconds. Neurological:      Mental Status: He is alert and oriented to person, place, and time. Psychiatric:         Mood and Affect: Mood normal.         Behavior: Behavior normal.               An electronic signature was used to authenticate this note.     --SOFY Salmeron - CNP

## 2022-03-23 NOTE — ASSESSMENT & PLAN NOTE
Check labs  Addressed colonoscopy- last one 4 years ago with polyps. Pt to call insurance regarding colonoscopy coverage. No acute concerns.   UTD on vaccines  Discussed diet and exercise

## 2022-04-22 PROBLEM — Z00.00 ROUTINE GENERAL MEDICAL EXAMINATION AT A HEALTH CARE FACILITY: Status: RESOLVED | Noted: 2022-03-23 | Resolved: 2022-04-22

## 2022-05-03 RX ORDER — ESCITALOPRAM OXALATE 10 MG/1
TABLET ORAL
Qty: 90 TABLET | Refills: 1 | Status: SHIPPED | OUTPATIENT
Start: 2022-05-03 | End: 2022-11-01

## 2022-05-05 RX ORDER — LEVOTHYROXINE SODIUM 88 MCG
TABLET ORAL
Qty: 90 TABLET | Refills: 1 | Status: SHIPPED | OUTPATIENT
Start: 2022-05-05

## 2022-05-05 RX ORDER — LEVOTHYROXINE SODIUM 88 MCG
TABLET ORAL
Qty: 90 TABLET | Refills: 1 | Status: SHIPPED | OUTPATIENT
Start: 2022-05-05 | End: 2022-05-05

## 2022-05-12 DIAGNOSIS — E03.9 ACQUIRED HYPOTHYROIDISM: ICD-10-CM

## 2022-05-12 DIAGNOSIS — E78.00 PURE HYPERCHOLESTEROLEMIA: ICD-10-CM

## 2022-05-12 DIAGNOSIS — Z00.00 ROUTINE GENERAL MEDICAL EXAMINATION AT A HEALTH CARE FACILITY: ICD-10-CM

## 2022-05-12 LAB
A/G RATIO: 1.7 (ref 1.1–2.2)
ALBUMIN SERPL-MCNC: 4.2 G/DL (ref 3.4–5)
ALP BLD-CCNC: 71 U/L (ref 40–129)
ALT SERPL-CCNC: 11 U/L (ref 10–40)
ANION GAP SERPL CALCULATED.3IONS-SCNC: 15 MMOL/L (ref 3–16)
AST SERPL-CCNC: 17 U/L (ref 15–37)
BILIRUB SERPL-MCNC: 0.6 MG/DL (ref 0–1)
BUN BLDV-MCNC: 25 MG/DL (ref 7–20)
CALCIUM SERPL-MCNC: 9.2 MG/DL (ref 8.3–10.6)
CHLORIDE BLD-SCNC: 104 MMOL/L (ref 99–110)
CHOLESTEROL, TOTAL: 161 MG/DL (ref 0–199)
CO2: 23 MMOL/L (ref 21–32)
CREAT SERPL-MCNC: 1.8 MG/DL (ref 0.8–1.3)
GFR AFRICAN AMERICAN: 43
GFR NON-AFRICAN AMERICAN: 36
GLUCOSE BLD-MCNC: 107 MG/DL (ref 70–99)
HCT VFR BLD CALC: 36.4 % (ref 40.5–52.5)
HDLC SERPL-MCNC: 50 MG/DL (ref 40–60)
HEMOGLOBIN: 12.2 G/DL (ref 13.5–17.5)
LDL CHOLESTEROL CALCULATED: 96 MG/DL
MCH RBC QN AUTO: 30 PG (ref 26–34)
MCHC RBC AUTO-ENTMCNC: 33.5 G/DL (ref 31–36)
MCV RBC AUTO: 89.6 FL (ref 80–100)
PDW BLD-RTO: 14.5 % (ref 12.4–15.4)
PLATELET # BLD: 282 K/UL (ref 135–450)
PMV BLD AUTO: 8.4 FL (ref 5–10.5)
POTASSIUM SERPL-SCNC: 4.8 MMOL/L (ref 3.5–5.1)
RBC # BLD: 4.06 M/UL (ref 4.2–5.9)
SODIUM BLD-SCNC: 142 MMOL/L (ref 136–145)
TOTAL PROTEIN: 6.7 G/DL (ref 6.4–8.2)
TRIGL SERPL-MCNC: 75 MG/DL (ref 0–150)
TSH REFLEX: 1.49 UIU/ML (ref 0.27–4.2)
VLDLC SERPL CALC-MCNC: 15 MG/DL
WBC # BLD: 7.3 K/UL (ref 4–11)

## 2022-09-15 DIAGNOSIS — N18.30 STAGE 3 CHRONIC KIDNEY DISEASE, UNSPECIFIED WHETHER STAGE 3A OR 3B CKD (HCC): ICD-10-CM

## 2022-09-15 LAB
ALBUMIN SERPL-MCNC: 4.1 G/DL (ref 3.4–5)
ANION GAP SERPL CALCULATED.3IONS-SCNC: 13 MMOL/L (ref 3–16)
BASOPHILS ABSOLUTE: 0.1 K/UL (ref 0–0.2)
BASOPHILS RELATIVE PERCENT: 0.7 %
BUN BLDV-MCNC: 29 MG/DL (ref 7–20)
CALCIUM SERPL-MCNC: 9.1 MG/DL (ref 8.3–10.6)
CHLORIDE BLD-SCNC: 105 MMOL/L (ref 99–110)
CO2: 24 MMOL/L (ref 21–32)
CREAT SERPL-MCNC: 1.6 MG/DL (ref 0.8–1.3)
CREATININE URINE: 85.4 MG/DL (ref 39–259)
EOSINOPHILS ABSOLUTE: 0.2 K/UL (ref 0–0.6)
EOSINOPHILS RELATIVE PERCENT: 2.6 %
GFR AFRICAN AMERICAN: 50
GFR NON-AFRICAN AMERICAN: 41
GLUCOSE BLD-MCNC: 110 MG/DL (ref 70–99)
HCT VFR BLD CALC: 34.2 % (ref 40.5–52.5)
HEMOGLOBIN: 11.5 G/DL (ref 13.5–17.5)
LYMPHOCYTES ABSOLUTE: 1 K/UL (ref 1–5.1)
LYMPHOCYTES RELATIVE PERCENT: 13.2 %
MCH RBC QN AUTO: 30.4 PG (ref 26–34)
MCHC RBC AUTO-ENTMCNC: 33.7 G/DL (ref 31–36)
MCV RBC AUTO: 90.2 FL (ref 80–100)
MICROALBUMIN UR-MCNC: 1.5 MG/DL
MICROALBUMIN/CREAT UR-RTO: 17.6 MG/G (ref 0–30)
MONOCYTES ABSOLUTE: 0.8 K/UL (ref 0–1.3)
MONOCYTES RELATIVE PERCENT: 10.4 %
NEUTROPHILS ABSOLUTE: 5.5 K/UL (ref 1.7–7.7)
NEUTROPHILS RELATIVE PERCENT: 73.1 %
PDW BLD-RTO: 15 % (ref 12.4–15.4)
PHOSPHORUS: 3.6 MG/DL (ref 2.5–4.9)
PLATELET # BLD: 246 K/UL (ref 135–450)
PMV BLD AUTO: 9.1 FL (ref 5–10.5)
POTASSIUM SERPL-SCNC: 4.8 MMOL/L (ref 3.5–5.1)
RBC # BLD: 3.78 M/UL (ref 4.2–5.9)
SODIUM BLD-SCNC: 142 MMOL/L (ref 136–145)
WBC # BLD: 7.6 K/UL (ref 4–11)

## 2022-09-26 ENCOUNTER — OFFICE VISIT (OUTPATIENT)
Dept: FAMILY MEDICINE CLINIC | Age: 87
End: 2022-09-26
Payer: MEDICARE

## 2022-09-26 VITALS
DIASTOLIC BLOOD PRESSURE: 62 MMHG | HEART RATE: 68 BPM | SYSTOLIC BLOOD PRESSURE: 138 MMHG | OXYGEN SATURATION: 99 % | BODY MASS INDEX: 29.63 KG/M2 | HEIGHT: 70 IN | WEIGHT: 207 LBS | TEMPERATURE: 97.8 F

## 2022-09-26 DIAGNOSIS — E03.9 ACQUIRED HYPOTHYROIDISM: ICD-10-CM

## 2022-09-26 DIAGNOSIS — I48.0 PAROXYSMAL ATRIAL FIBRILLATION (HCC): ICD-10-CM

## 2022-09-26 DIAGNOSIS — I10 HTN (HYPERTENSION), BENIGN: ICD-10-CM

## 2022-09-26 DIAGNOSIS — Z00.00 MEDICARE ANNUAL WELLNESS VISIT, SUBSEQUENT: Primary | ICD-10-CM

## 2022-09-26 DIAGNOSIS — N18.32 STAGE 3B CHRONIC KIDNEY DISEASE (HCC): ICD-10-CM

## 2022-09-26 PROCEDURE — 1123F ACP DISCUSS/DSCN MKR DOCD: CPT | Performed by: NURSE PRACTITIONER

## 2022-09-26 PROCEDURE — G0008 ADMIN INFLUENZA VIRUS VAC: HCPCS | Performed by: NURSE PRACTITIONER

## 2022-09-26 PROCEDURE — 90694 VACC AIIV4 NO PRSRV 0.5ML IM: CPT | Performed by: NURSE PRACTITIONER

## 2022-09-26 PROCEDURE — G0439 PPPS, SUBSEQ VISIT: HCPCS | Performed by: NURSE PRACTITIONER

## 2022-09-26 SDOH — ECONOMIC STABILITY: FOOD INSECURITY: WITHIN THE PAST 12 MONTHS, YOU WORRIED THAT YOUR FOOD WOULD RUN OUT BEFORE YOU GOT MONEY TO BUY MORE.: NEVER TRUE

## 2022-09-26 SDOH — ECONOMIC STABILITY: FOOD INSECURITY: WITHIN THE PAST 12 MONTHS, THE FOOD YOU BOUGHT JUST DIDN'T LAST AND YOU DIDN'T HAVE MONEY TO GET MORE.: NEVER TRUE

## 2022-09-26 ASSESSMENT — PATIENT HEALTH QUESTIONNAIRE - PHQ9
10. IF YOU CHECKED OFF ANY PROBLEMS, HOW DIFFICULT HAVE THESE PROBLEMS MADE IT FOR YOU TO DO YOUR WORK, TAKE CARE OF THINGS AT HOME, OR GET ALONG WITH OTHER PEOPLE: 0
SUM OF ALL RESPONSES TO PHQ QUESTIONS 1-9: 0
SUM OF ALL RESPONSES TO PHQ QUESTIONS 1-9: 0
4. FEELING TIRED OR HAVING LITTLE ENERGY: 0
5. POOR APPETITE OR OVEREATING: 0
SUM OF ALL RESPONSES TO PHQ QUESTIONS 1-9: 0
1. LITTLE INTEREST OR PLEASURE IN DOING THINGS: 0
SUM OF ALL RESPONSES TO PHQ QUESTIONS 1-9: 0
8. MOVING OR SPEAKING SO SLOWLY THAT OTHER PEOPLE COULD HAVE NOTICED. OR THE OPPOSITE, BEING SO FIGETY OR RESTLESS THAT YOU HAVE BEEN MOVING AROUND A LOT MORE THAN USUAL: 0
SUM OF ALL RESPONSES TO PHQ9 QUESTIONS 1 & 2: 0
9. THOUGHTS THAT YOU WOULD BE BETTER OFF DEAD, OR OF HURTING YOURSELF: 0
6. FEELING BAD ABOUT YOURSELF - OR THAT YOU ARE A FAILURE OR HAVE LET YOURSELF OR YOUR FAMILY DOWN: 0
2. FEELING DOWN, DEPRESSED OR HOPELESS: 0
7. TROUBLE CONCENTRATING ON THINGS, SUCH AS READING THE NEWSPAPER OR WATCHING TELEVISION: 0
3. TROUBLE FALLING OR STAYING ASLEEP: 0

## 2022-09-26 ASSESSMENT — SOCIAL DETERMINANTS OF HEALTH (SDOH): HOW HARD IS IT FOR YOU TO PAY FOR THE VERY BASICS LIKE FOOD, HOUSING, MEDICAL CARE, AND HEATING?: NOT HARD AT ALL

## 2022-09-26 NOTE — PROGRESS NOTES
Medicare Annual Wellness Visit    Elizabeth Solano is here for Medicare AWV    Assessment & Plan   Medicare annual wellness visit, subsequent  Assessment & Plan:   deborah completed  Doing well   Flu vax in office  Due for cscope---recent labs with nephrology shows a slight anemia, which is worse from earlier this year. Advised calling GI to get colonoscopy scheduled. He denies any blood in stool or urine. Acquired hypothyroidism  Assessment & Plan:   Stable, controlled  No changes  Continue current treatment plan     Hypertension, benign  Assessment & Plan:   Stable, controlled  No changes  Continue current treatment plan     Paroxysmal atrial fibrillation (HCC)  Assessment & Plan:   Monitored by specialist- no acute findings meriting change in the plan  Stage 3b chronic kidney disease  Assessment & Plan:   Monitored by specialist- no acute findings meriting change in the plan    Recommendations for Preventive Services Due: see orders and patient instructions/AVS.  Recommended screening schedule for the next 5-10 years is provided to the patient in written form: see Patient Instructions/AVS.     Return for Medicare Annual Wellness Visit in 1 year. Subjective       Patient's complete Health Risk Assessment and screening values have been reviewed and are found in Flowsheets. The following problems were reviewed today and where indicated follow up appointments were made and/or referrals ordered.     Positive Risk Factor Screenings with Interventions:             General Health and ACP:  General  In general, how would you say your health is?: Good  In the past 7 days, have you experienced any of the following: New or Increased Pain, New or Increased Fatigue, Loneliness, Social Isolation, Stress or Anger?: No  Do you get the social and emotional support that you need?: Yes  Do you have a Living Will?: Yes    Advance Directives       Power of  Living Will ACP-Advance Directive ACP-Power of     Not on File Not on File Not on File Not on File        General Health Risk Interventions:  No concerns              Objective   Vitals:    09/26/22 1015 09/26/22 1032   BP: (!) 160/80 138/62   Site: Left Upper Arm    Position: Sitting    Cuff Size: Large Adult    Pulse: 68    Temp: 97.8 °F (36.6 °C)    SpO2: 99%    Weight: 207 lb (93.9 kg)    Height: 5' 10\" (1.778 m)       Body mass index is 29.7 kg/m². General Appearance: alert and oriented to person, place and time, well developed and well- nourished, in no acute distress  Skin: warm and dry, no rash or erythema  Head: normocephalic and atraumatic  Eyes: pupils equal, round, and reactive to light, extraocular eye movements intact, conjunctivae normal  ENT: tympanic membrane, external ear and ear canal normal bilaterally, nose without deformity, nasal mucosa and turbinates normal without polyps  Neck: supple and non-tender without mass, no thyromegaly or thyroid nodules, no cervical lymphadenopathy  Pulmonary/Chest: clear to auscultation bilaterally- no wheezes, rales or rhonchi, normal air movement, no respiratory distress  Cardiovascular: normal rate, regular rhythm, normal S1 and S2, no murmurs, rubs, clicks, or gallops, distal pulses intact, no carotid bruits  Abdomen: soft, non-tender, non-distended, normal bowel sounds, no masses or organomegaly  Extremities: no cyanosis, clubbing or edema  Musculoskeletal: normal range of motion, no joint swelling, deformity or tenderness  Neurologic: reflexes normal and symmetric, no cranial nerve deficit, gait, coordination and speech normal       Allergies   Allergen Reactions    Bactrim [Sulfamethoxazole-Trimethoprim] Hives    Ciprofloxacin Hives    Methylprednisolone Other (See Comments)     Dizzy    Sulfa Antibiotics Hives     Prior to Visit Medications    Medication Sig Taking?  Authorizing Provider   SYNTHROID 88 MCG tablet TAKE 1 TABLET BY MOUTH DAILY Yes SOFY Whelan - CNP   escitalopram (LEXAPRO) 10 MG tablet TAKE 1 TABLET BY MOUTH DAILY Yes SOFY Pichardo CNP   pravastatin (PRAVACHOL) 40 MG tablet TAKE 1 TABLET BY MOUTH AT BEDTIME Yes SOFY Pichardo CNP   digoxin (LANOXIN) 125 MCG tablet Take 125 mcg by mouth every other day Yes Historical Provider, MD   pantoprazole (PROTONIX) 40 MG tablet TAKE 1 TABLET BY MOUTH EVERY MORNING BEFORE BREAKFAST Yes Nilson Yee MD   clobetasol (TEMOVATE) 0.05 % cream Apply twice a day as needed. Yes Nilson Yee MD   ramipril (ALTACE) 10 MG capsule TAKE 1 CAPSULE BY MOUTH DAILY Yes SOFY Gooden CNP   tamsulosin (FLOMAX) 0.4 MG capsule Take 0.8 mg by mouth daily  Yes Historical Provider, MD   flecainide (TAMBOCOR) 100 MG tablet Take 100 mg by mouth 2 times daily. Yes Historical Provider, MD   aspirin 81 MG tablet Take 81 mg by mouth daily. Yes Historical Provider, MD   therapeutic multivitamin-minerals (THERAGRAN-M) tablet Take 1 tablet by mouth daily.  Yes Historical Provider, MD   famotidine (PEPCID) 10 MG tablet Take 1 tablet by mouth 2 times daily for 15 days  SOFY Pichardo CNP       CareTeam (Including outside providers/suppliers regularly involved in providing care):   Patient Care Team:  SOFY Pichardo CNP as PCP - General (Nurse Practitioner)  SOFY Pichardo CNP as PCP - [facility] Empaneled Provider     Reviewed and updated this visit:  Tobacco  Allergies  Meds  Med Hx  Surg Hx  Soc Hx  Fam Hx

## 2022-09-26 NOTE — ASSESSMENT & PLAN NOTE
awv completed  Doing well   Flu vax in office  Due for cscope---recent labs with nephrology shows a slight anemia, which is worse from earlier this year. Advised calling GI to get colonoscopy scheduled. He denies any blood in stool or urine.

## 2022-09-26 NOTE — PATIENT INSTRUCTIONS
Personalized Preventive Plan for Saul Ramesh - 9/26/2022  Medicare offers a range of preventive health benefits. Some of the tests and screenings are paid in full while other may be subject to a deductible, co-insurance, and/or copay. Some of these benefits include a comprehensive review of your medical history including lifestyle, illnesses that may run in your family, and various assessments and screenings as appropriate. After reviewing your medical record and screening and assessments performed today your provider may have ordered immunizations, labs, imaging, and/or referrals for you. A list of these orders (if applicable) as well as your Preventive Care list are included within your After Visit Summary for your review. Other Preventive Recommendations:    A preventive eye exam performed by an eye specialist is recommended every 1-2 years to screen for glaucoma; cataracts, macular degeneration, and other eye disorders. A preventive dental visit is recommended every 6 months. Try to get at least 150 minutes of exercise per week or 10,000 steps per day on a pedometer . Order or download the FREE \"Exercise & Physical Activity: Your Everyday Guide\" from [employer]. Call 0-992.516.8165 or search [employer] online. You need 6606-0047 mg of calcium and 4284-3991 IU of vitamin D per day. It is possible to meet your calcium requirement with diet alone, but a vitamin D supplement is usually necessary to meet this goal.  When exposed to the sun, use a sunscreen that protects against both UVA and UVB radiation with an SPF of 30 or greater. Reapply every 2 to 3 hours or after sweating, drying off with a towel, or swimming. Always wear a seat belt when traveling in a car. Always wear a helmet when riding a bicycle or motorcycle.

## 2022-10-26 PROBLEM — Z00.00 MEDICARE ANNUAL WELLNESS VISIT, SUBSEQUENT: Status: RESOLVED | Noted: 2022-03-23 | Resolved: 2022-10-26

## 2022-11-01 RX ORDER — ESCITALOPRAM OXALATE 10 MG/1
TABLET ORAL
Qty: 90 TABLET | Refills: 1 | Status: SHIPPED | OUTPATIENT
Start: 2022-11-01

## 2022-11-21 ENCOUNTER — OFFICE VISIT (OUTPATIENT)
Dept: FAMILY MEDICINE CLINIC | Age: 87
End: 2022-11-21
Payer: MEDICARE

## 2022-11-21 VITALS
TEMPERATURE: 97.1 F | HEART RATE: 74 BPM | WEIGHT: 209 LBS | HEIGHT: 70 IN | DIASTOLIC BLOOD PRESSURE: 78 MMHG | SYSTOLIC BLOOD PRESSURE: 126 MMHG | OXYGEN SATURATION: 99 % | BODY MASS INDEX: 29.92 KG/M2

## 2022-11-21 DIAGNOSIS — G44.329 CHRONIC POST-TRAUMATIC HEADACHE, NOT INTRACTABLE: Primary | ICD-10-CM

## 2022-11-21 PROCEDURE — 99214 OFFICE O/P EST MOD 30 MIN: CPT | Performed by: NURSE PRACTITIONER

## 2022-11-21 PROCEDURE — 1123F ACP DISCUSS/DSCN MKR DOCD: CPT | Performed by: NURSE PRACTITIONER

## 2022-11-21 ASSESSMENT — ENCOUNTER SYMPTOMS
COLOR CHANGE: 0
NAUSEA: 0
SINUS PRESSURE: 0
WHEEZING: 0
ABDOMINAL PAIN: 0
SORE THROAT: 0
BACK PAIN: 0
COUGH: 0
EYE REDNESS: 0
DIARRHEA: 0
VOMITING: 0
RHINORRHEA: 0
CONSTIPATION: 0
CHEST TIGHTNESS: 0
SHORTNESS OF BREATH: 0
EYE ITCHING: 0
BLOOD IN STOOL: 0

## 2022-11-21 NOTE — PROGRESS NOTES
Nalini Lyles (:  1934) is a 80 y.o. male,Established patient, here for evaluation of the following chief complaint(s):  Follow-up (headaches)      ASSESSMENT/PLAN:  1. Chronic post-traumatic headache, not intractable  Assessment & Plan: At this time, CT was reviewed which was neg in the ER with the exception for age related changes. Given ongoing complaints, we will evaluate MRI of the brain, depending on these results get neuro opinion. Orders:  -     MRI BRAIN WO CONTRAST; Future    No follow-ups on file. SUBJECTIVE/OBJECTIVE:  Pt fell 6 months ago and he has been having headaches since. He fell hitting the black top on the frontal aspect of his skull. He went to the ER and they scanned his head and told him there was nothing wrong. He states that the headaches are all the time. He states that it bothers his left eye sometimes. He takes tylenol, it helps some but not completely. He states that bending over makes it worse. Prior to his fall he had not had headaches like this before. He states that nothing takes them away completely. He denies any other symptoms. Declines that it is the worst headache of his life. He had an eye exam in September that was normal.     Current Outpatient Medications   Medication Sig Dispense Refill    escitalopram (LEXAPRO) 10 MG tablet TAKE 1 TABLET BY MOUTH DAILY 90 tablet 1    SYNTHROID 88 MCG tablet TAKE 1 TABLET BY MOUTH DAILY 90 tablet 1    pravastatin (PRAVACHOL) 40 MG tablet TAKE 1 TABLET BY MOUTH AT BEDTIME 90 tablet 3    famotidine (PEPCID) 10 MG tablet Take 1 tablet by mouth 2 times daily for 15 days 30 tablet 0    digoxin (LANOXIN) 125 MCG tablet Take 125 mcg by mouth every other day      pantoprazole (PROTONIX) 40 MG tablet TAKE 1 TABLET BY MOUTH EVERY MORNING BEFORE BREAKFAST 90 tablet 1    clobetasol (TEMOVATE) 0.05 % cream Apply twice a day as needed.  60 g 3    ramipril (ALTACE) 10 MG capsule TAKE 1 CAPSULE BY MOUTH DAILY 90 capsule 0    tamsulosin (FLOMAX) 0.4 MG capsule Take 0.8 mg by mouth daily       flecainide (TAMBOCOR) 100 MG tablet Take 100 mg by mouth 2 times daily. aspirin 81 MG tablet Take 81 mg by mouth daily. therapeutic multivitamin-minerals (THERAGRAN-M) tablet Take 1 tablet by mouth daily. No current facility-administered medications for this visit. Review of Systems   Constitutional:  Negative for chills, fatigue and fever. HENT:  Negative for congestion, ear pain, postnasal drip, rhinorrhea, sinus pressure, sneezing and sore throat. Eyes:  Negative for redness and itching. Respiratory:  Negative for cough, chest tightness, shortness of breath and wheezing. Cardiovascular:  Negative for chest pain and palpitations. Gastrointestinal:  Negative for abdominal pain, blood in stool, constipation, diarrhea, nausea and vomiting. Endocrine: Negative for cold intolerance and heat intolerance. Genitourinary:  Negative for difficulty urinating, dysuria, flank pain, frequency, hematuria and urgency. Musculoskeletal:  Negative for arthralgias, back pain, joint swelling and myalgias. Skin:  Negative for color change, pallor, rash and wound. Allergic/Immunologic: Negative for environmental allergies and food allergies. Neurological:  Positive for headaches. Negative for dizziness, seizures, syncope, weakness, light-headedness and numbness. Hematological:  Negative for adenopathy. Does not bruise/bleed easily. Psychiatric/Behavioral:  Negative for confusion, sleep disturbance and suicidal ideas. The patient is not nervous/anxious and is not hyperactive. Vitals:    11/21/22 0859   BP: 126/78   Site: Left Upper Arm   Position: Sitting   Cuff Size: Medium Adult   Pulse: 74   Temp: 97.1 °F (36.2 °C)   SpO2: 99%   Weight: 209 lb (94.8 kg)   Height: 5' 10\" (1.778 m)       Physical Exam  Constitutional:       Appearance: Normal appearance. He is well-developed. HENT:      Head: Normocephalic and atraumatic. Right Ear: Hearing normal.      Left Ear: Hearing normal.      Nose: No mucosal edema. Right Sinus: No maxillary sinus tenderness or frontal sinus tenderness. Left Sinus: No maxillary sinus tenderness or frontal sinus tenderness. Mouth/Throat: Tonsils: No tonsillar abscesses. Eyes:      Extraocular Movements: Extraocular movements intact. Pupils: Pupils are equal, round, and reactive to light. Cardiovascular:      Rate and Rhythm: Normal rate and regular rhythm. Pulses: Normal pulses. Heart sounds: Normal heart sounds. Pulmonary:      Effort: Pulmonary effort is normal.      Breath sounds: Normal breath sounds. Lymphadenopathy:      Head:      Right side of head: No submental, submandibular, tonsillar, preauricular, posterior auricular or occipital adenopathy. Left side of head: No submental, submandibular, tonsillar, preauricular, posterior auricular or occipital adenopathy. Skin:     General: Skin is warm and dry. Neurological:      Mental Status: He is alert. Psychiatric:         Mood and Affect: Mood normal.         Behavior: Behavior normal.               An electronic signature was used to authenticate this note.     --Rosa Elena Palafox, APRSHERRI - CNP

## 2022-11-21 NOTE — ASSESSMENT & PLAN NOTE
At this time, CT was reviewed which was neg in the ER with the exception for age related changes. Given ongoing complaints, we will evaluate MRI of the brain, depending on these results get neuro opinion.

## 2022-12-12 ENCOUNTER — HOSPITAL ENCOUNTER (OUTPATIENT)
Dept: MRI IMAGING | Age: 87
Discharge: HOME | End: 2022-12-12
Payer: MEDICARE

## 2022-12-12 DIAGNOSIS — G44.329 CHRONIC POST-TRAUMATIC HEADACHE, NOT INTRACTABLE: ICD-10-CM

## 2022-12-12 PROCEDURE — 70551 MRI BRAIN STEM W/O DYE: CPT

## 2022-12-13 RX ORDER — LEVOTHYROXINE SODIUM 88 MCG
TABLET ORAL
Qty: 90 TABLET | Refills: 1 | Status: SHIPPED | OUTPATIENT
Start: 2022-12-13

## 2023-01-18 DIAGNOSIS — N18.30 STAGE 3 CHRONIC KIDNEY DISEASE, UNSPECIFIED WHETHER STAGE 3A OR 3B CKD (HCC): ICD-10-CM

## 2023-01-18 LAB
ALBUMIN SERPL-MCNC: 4 G/DL (ref 3.4–5)
ANION GAP SERPL CALCULATED.3IONS-SCNC: 10 MMOL/L (ref 3–16)
BUN BLDV-MCNC: 26 MG/DL (ref 7–20)
CALCIUM SERPL-MCNC: 8.9 MG/DL (ref 8.3–10.6)
CHLORIDE BLD-SCNC: 103 MMOL/L (ref 99–110)
CO2: 25 MMOL/L (ref 21–32)
CREAT SERPL-MCNC: 1.5 MG/DL (ref 0.8–1.3)
CREATININE URINE: 67.5 MG/DL (ref 39–259)
GFR SERPL CREATININE-BSD FRML MDRD: 44 ML/MIN/{1.73_M2}
GLUCOSE BLD-MCNC: 110 MG/DL (ref 70–99)
MICROALBUMIN UR-MCNC: <1.2 MG/DL
MICROALBUMIN/CREAT UR-RTO: NORMAL MG/G (ref 0–30)
PHOSPHORUS: 3.3 MG/DL (ref 2.5–4.9)
POTASSIUM SERPL-SCNC: 4.7 MMOL/L (ref 3.5–5.1)
SODIUM BLD-SCNC: 138 MMOL/L (ref 136–145)

## 2023-01-24 ENCOUNTER — OFFICE VISIT (OUTPATIENT)
Dept: FAMILY MEDICINE CLINIC | Age: 88
End: 2023-01-24
Payer: MEDICARE

## 2023-01-24 VITALS
HEIGHT: 70 IN | DIASTOLIC BLOOD PRESSURE: 68 MMHG | TEMPERATURE: 98.3 F | WEIGHT: 207 LBS | SYSTOLIC BLOOD PRESSURE: 138 MMHG | BODY MASS INDEX: 29.63 KG/M2 | OXYGEN SATURATION: 99 % | HEART RATE: 70 BPM

## 2023-01-24 DIAGNOSIS — H61.22 IMPACTED CERUMEN OF LEFT EAR: ICD-10-CM

## 2023-01-24 PROCEDURE — 99213 OFFICE O/P EST LOW 20 MIN: CPT | Performed by: NURSE PRACTITIONER

## 2023-01-24 PROCEDURE — 1123F ACP DISCUSS/DSCN MKR DOCD: CPT | Performed by: NURSE PRACTITIONER

## 2023-01-24 ASSESSMENT — PATIENT HEALTH QUESTIONNAIRE - PHQ9
SUM OF ALL RESPONSES TO PHQ9 QUESTIONS 1 & 2: 2
8. MOVING OR SPEAKING SO SLOWLY THAT OTHER PEOPLE COULD HAVE NOTICED. OR THE OPPOSITE, BEING SO FIGETY OR RESTLESS THAT YOU HAVE BEEN MOVING AROUND A LOT MORE THAN USUAL: 0
SUM OF ALL RESPONSES TO PHQ QUESTIONS 1-9: 3
5. POOR APPETITE OR OVEREATING: 0
9. THOUGHTS THAT YOU WOULD BE BETTER OFF DEAD, OR OF HURTING YOURSELF: 0
SUM OF ALL RESPONSES TO PHQ QUESTIONS 1-9: 3
4. FEELING TIRED OR HAVING LITTLE ENERGY: 1
3. TROUBLE FALLING OR STAYING ASLEEP: 0
2. FEELING DOWN, DEPRESSED OR HOPELESS: 1
SUM OF ALL RESPONSES TO PHQ QUESTIONS 1-9: 3
7. TROUBLE CONCENTRATING ON THINGS, SUCH AS READING THE NEWSPAPER OR WATCHING TELEVISION: 0
10. IF YOU CHECKED OFF ANY PROBLEMS, HOW DIFFICULT HAVE THESE PROBLEMS MADE IT FOR YOU TO DO YOUR WORK, TAKE CARE OF THINGS AT HOME, OR GET ALONG WITH OTHER PEOPLE: 0
SUM OF ALL RESPONSES TO PHQ QUESTIONS 1-9: 3
6. FEELING BAD ABOUT YOURSELF - OR THAT YOU ARE A FAILURE OR HAVE LET YOURSELF OR YOUR FAMILY DOWN: 0
1. LITTLE INTEREST OR PLEASURE IN DOING THINGS: 1

## 2023-01-24 ASSESSMENT — ENCOUNTER SYMPTOMS
CONSTIPATION: 0
ABDOMINAL PAIN: 0
DIARRHEA: 0
SINUS PRESSURE: 0
BACK PAIN: 0
COLOR CHANGE: 0
WHEEZING: 0
COUGH: 0
SHORTNESS OF BREATH: 0
SINUS PAIN: 0

## 2023-01-24 NOTE — PROGRESS NOTES
Saul Ramesh (:  1934) is a 80 y.o. male,Established patient, here for evaluation of the following chief complaint(s):  Ear Problem (Left ear)      ASSESSMENT/PLAN:  1. Impacted cerumen of left ear  Assessment & Plan:  Irrigated without difficulty   Tylenol for pain prn   Call if symptoms worsen or fail to improve        No follow-ups on file. SUBJECTIVE/OBJECTIVE:  HPI  Patient is here for complaints of left ear fullness and hearing loss over the last several weeks. Denies drainage from the ear. No ear pain. Slight sinus congestion. No rhinorrhea, sinus pain. He has not tried any medications for this. Current Outpatient Medications   Medication Sig Dispense Refill    SYNTHROID 88 MCG tablet TAKE 1 TABLET BY MOUTH DAILY 90 tablet 1    escitalopram (LEXAPRO) 10 MG tablet TAKE 1 TABLET BY MOUTH DAILY 90 tablet 1    pravastatin (PRAVACHOL) 40 MG tablet TAKE 1 TABLET BY MOUTH AT BEDTIME 90 tablet 3    digoxin (LANOXIN) 125 MCG tablet Take 125 mcg by mouth every other day      pantoprazole (PROTONIX) 40 MG tablet TAKE 1 TABLET BY MOUTH EVERY MORNING BEFORE BREAKFAST 90 tablet 1    clobetasol (TEMOVATE) 0.05 % cream Apply twice a day as needed. 60 g 3    ramipril (ALTACE) 10 MG capsule TAKE 1 CAPSULE BY MOUTH DAILY 90 capsule 0    tamsulosin (FLOMAX) 0.4 MG capsule Take 0.8 mg by mouth daily       flecainide (TAMBOCOR) 100 MG tablet Take 100 mg by mouth 2 times daily. aspirin 81 MG tablet Take 81 mg by mouth daily. therapeutic multivitamin-minerals (THERAGRAN-M) tablet Take 1 tablet by mouth daily. famotidine (PEPCID) 10 MG tablet Take 1 tablet by mouth 2 times daily for 15 days 30 tablet 0     No current facility-administered medications for this visit. Review of Systems   Constitutional:  Negative for chills, fatigue and fever. HENT:  Negative for congestion, sinus pressure and sinus pain.          Ear fullness   Respiratory:  Negative for cough, shortness of breath and wheezing. Cardiovascular:  Negative for chest pain and palpitations. Gastrointestinal:  Negative for abdominal pain, constipation and diarrhea. Musculoskeletal:  Negative for arthralgias, back pain and myalgias. Skin:  Negative for color change, pallor and rash. Neurological:  Negative for dizziness, syncope, weakness, light-headedness and headaches. Psychiatric/Behavioral:  Negative for behavioral problems, confusion and sleep disturbance. The patient is not nervous/anxious. Vitals:    01/24/23 1116   BP: 138/68   Site: Left Upper Arm   Position: Sitting   Cuff Size: Large Adult   Pulse: 70   Temp: 98.3 °F (36.8 °C)   SpO2: 99%   Weight: 207 lb (93.9 kg)   Height: 5' 10\" (1.778 m)       Physical Exam  Constitutional:       Appearance: Normal appearance. HENT:      Head: Normocephalic and atraumatic. Right Ear: Tympanic membrane normal.      Left Ear: There is impacted cerumen. Eyes:      Extraocular Movements: Extraocular movements intact. Pulmonary:      Effort: Pulmonary effort is normal.   Musculoskeletal:      Cervical back: Normal range of motion. Skin:     Coloration: Skin is not jaundiced or pale. Neurological:      General: No focal deficit present. Mental Status: He is alert and oriented to person, place, and time. Psychiatric:         Mood and Affect: Mood normal.         Behavior: Behavior normal.         Thought Content: Thought content normal.           An electronic signature was used to authenticate this note.     --SOFY Roberto - CNP

## 2023-01-24 NOTE — ASSESSMENT & PLAN NOTE
Unable to irrigate in office  Will send for Debrox drops  Follow-up in 1 week  Tylenol for pain prn   Call if symptoms worsen or fail to improve

## 2023-02-24 ENCOUNTER — OFFICE VISIT (OUTPATIENT)
Dept: FAMILY MEDICINE CLINIC | Age: 88
End: 2023-02-24
Payer: MEDICARE

## 2023-02-24 VITALS
SYSTOLIC BLOOD PRESSURE: 138 MMHG | OXYGEN SATURATION: 97 % | HEIGHT: 70 IN | WEIGHT: 208 LBS | DIASTOLIC BLOOD PRESSURE: 76 MMHG | TEMPERATURE: 98.3 F | HEART RATE: 70 BPM | BODY MASS INDEX: 29.78 KG/M2

## 2023-02-24 DIAGNOSIS — F32.5 MAJOR DEPRESSIVE DISORDER WITH SINGLE EPISODE, IN FULL REMISSION (HCC): ICD-10-CM

## 2023-02-24 DIAGNOSIS — G44.029 CHRONIC CLUSTER HEADACHE, NOT INTRACTABLE: ICD-10-CM

## 2023-02-24 PROCEDURE — 1123F ACP DISCUSS/DSCN MKR DOCD: CPT | Performed by: NURSE PRACTITIONER

## 2023-02-24 PROCEDURE — 99214 OFFICE O/P EST MOD 30 MIN: CPT | Performed by: NURSE PRACTITIONER

## 2023-02-24 RX ORDER — HYDROXYZINE HYDROCHLORIDE 10 MG/1
10 TABLET, FILM COATED ORAL NIGHTLY
Qty: 60 TABLET | Refills: 0 | Status: SHIPPED | OUTPATIENT
Start: 2023-02-24 | End: 2023-02-24

## 2023-02-24 RX ORDER — HYDROXYZINE HYDROCHLORIDE 10 MG/1
10 TABLET, FILM COATED ORAL 3 TIMES DAILY PRN
Qty: 60 TABLET | Refills: 2 | Status: SHIPPED | OUTPATIENT
Start: 2023-02-24 | End: 2023-03-26

## 2023-02-24 SDOH — ECONOMIC STABILITY: HOUSING INSECURITY
IN THE LAST 12 MONTHS, WAS THERE A TIME WHEN YOU DID NOT HAVE A STEADY PLACE TO SLEEP OR SLEPT IN A SHELTER (INCLUDING NOW)?: NO

## 2023-02-24 SDOH — ECONOMIC STABILITY: INCOME INSECURITY: HOW HARD IS IT FOR YOU TO PAY FOR THE VERY BASICS LIKE FOOD, HOUSING, MEDICAL CARE, AND HEATING?: NOT HARD AT ALL

## 2023-02-24 SDOH — ECONOMIC STABILITY: FOOD INSECURITY: WITHIN THE PAST 12 MONTHS, THE FOOD YOU BOUGHT JUST DIDN'T LAST AND YOU DIDN'T HAVE MONEY TO GET MORE.: NEVER TRUE

## 2023-02-24 SDOH — ECONOMIC STABILITY: FOOD INSECURITY: WITHIN THE PAST 12 MONTHS, YOU WORRIED THAT YOUR FOOD WOULD RUN OUT BEFORE YOU GOT MONEY TO BUY MORE.: NEVER TRUE

## 2023-02-24 ASSESSMENT — ENCOUNTER SYMPTOMS
BACK PAIN: 0
SINUS PRESSURE: 0
DIARRHEA: 0
WHEEZING: 0
COLOR CHANGE: 0
COUGH: 0
ABDOMINAL PAIN: 0
SHORTNESS OF BREATH: 0
SINUS PAIN: 0
CONSTIPATION: 0

## 2023-02-24 NOTE — PROGRESS NOTES
Jaylen Dunaway (:  1934) is a 80 y.o. male,Established patient, here for evaluation of the following chief complaint(s):  Headache and Otalgia      ASSESSMENT/PLAN:  1. Major depressive disorder with single episode, in full remission [facility])  Assessment & Plan:  Symptoms have been worsening  Continue Lexapro  We will add hydroxyzine to use anxiety and muscle tension  Discussed getting established with grief counselor. He will reach out to hospice to find resources. He will let me know if he is unable to get in touch with anyone. 2. Chronic cluster headache, not intractable  Assessment & Plan:  Headache exacerbated by muscle tension  Hydroxyzine as needed  Continue Tylenol if needed  Follow-up in 4 weeks    No follow-ups on file. SUBJECTIVE/OBJECTIVE:  Headache  Otalgia   Associated symptoms include headaches. Pertinent negatives include no abdominal pain, coughing, diarrhea or rash. Patient is here for concern of muscle tension and headaches. He is also concerned about depression and anxiety. States he has been more tearful over the loss of his wife in the last few months. She passed away 4 years ago. He had a friend/neighbor that would come and spend time with him on occasion however she has since started dating somebody and does not have the time for him anymore. Because of this he has been experiencing increased loneliness and grief. He states on occasion his muscles will get very tense when he is feeling sad or overwhelmed and causes him to have a headache. He did undergo MRI of the brain for his headaches recently which was negative. Takes Tylenol which helps only a little bit.     Current Outpatient Medications   Medication Sig Dispense Refill    hydrOXYzine HCl (ATARAX) 10 MG tablet Take 1 tablet by mouth 3 times daily as needed for Anxiety 60 tablet 2    SYNTHROID 88 MCG tablet TAKE 1 TABLET BY MOUTH DAILY 90 tablet 1    escitalopram (LEXAPRO) 10 MG tablet TAKE 1 TABLET BY MOUTH DAILY 90 tablet 1    pravastatin (PRAVACHOL) 40 MG tablet TAKE 1 TABLET BY MOUTH AT BEDTIME 90 tablet 3    digoxin (LANOXIN) 125 MCG tablet Take 125 mcg by mouth every other day      pantoprazole (PROTONIX) 40 MG tablet TAKE 1 TABLET BY MOUTH EVERY MORNING BEFORE BREAKFAST 90 tablet 1    clobetasol (TEMOVATE) 0.05 % cream Apply twice a day as needed. 60 g 3    ramipril (ALTACE) 10 MG capsule TAKE 1 CAPSULE BY MOUTH DAILY 90 capsule 0    tamsulosin (FLOMAX) 0.4 MG capsule Take 0.8 mg by mouth daily       flecainide (TAMBOCOR) 100 MG tablet Take 100 mg by mouth 2 times daily. aspirin 81 MG tablet Take 81 mg by mouth daily. therapeutic multivitamin-minerals (THERAGRAN-M) tablet Take 1 tablet by mouth daily. famotidine (PEPCID) 10 MG tablet Take 1 tablet by mouth 2 times daily for 15 days 30 tablet 0     No current facility-administered medications for this visit. Review of Systems   Constitutional:  Negative for chills, fatigue and fever. HENT:  Negative for congestion, sinus pressure and sinus pain. Respiratory:  Negative for cough, shortness of breath and wheezing. Cardiovascular:  Negative for chest pain and palpitations. Gastrointestinal:  Negative for abdominal pain, constipation and diarrhea. Musculoskeletal:  Negative for arthralgias, back pain and myalgias. Skin:  Negative for color change, pallor and rash. Neurological:  Positive for headaches. Negative for dizziness, syncope, weakness and light-headedness. Psychiatric/Behavioral:  Positive for dysphoric mood. Negative for behavioral problems, confusion and sleep disturbance. The patient is nervous/anxious. Vitals:    02/24/23 1257   BP: 138/76   Site: Left Upper Arm   Position: Sitting   Cuff Size: Medium Adult   Pulse: 70   Temp: 98.3 °F (36.8 °C)   SpO2: 97%   Weight: 208 lb (94.3 kg)   Height: 5' 10\" (1.778 m)       Physical Exam  Constitutional:       Appearance: Normal appearance.    HENT:      Head: Normocephalic and atraumatic. Eyes:      Extraocular Movements: Extraocular movements intact. Pulmonary:      Effort: Pulmonary effort is normal.   Musculoskeletal:      Cervical back: Normal range of motion. Skin:     Coloration: Skin is not jaundiced or pale. Neurological:      General: No focal deficit present. Mental Status: He is alert and oriented to person, place, and time. Psychiatric:         Mood and Affect: Mood normal. Affect is tearful. Behavior: Behavior normal.         Thought Content: Thought content normal.               An electronic signature was used to authenticate this note.     --SOFY Goldstein - CNP

## 2023-02-24 NOTE — ASSESSMENT & PLAN NOTE
Symptoms have been worsening  Continue Lexapro  We will add hydroxyzine to use anxiety and muscle tension  Discussed getting established with grief counselor. He will reach out to hospice to find resources. He will let me know if he is unable to get in touch with anyone.

## 2023-02-24 NOTE — ASSESSMENT & PLAN NOTE
Headache exacerbated by muscle tension  Hydroxyzine as needed  Continue Tylenol if needed  Follow-up in 4 weeks

## 2023-03-21 RX ORDER — PRAVASTATIN SODIUM 40 MG
TABLET ORAL
Qty: 90 TABLET | Refills: 3 | Status: SHIPPED | OUTPATIENT
Start: 2023-03-21

## 2023-05-02 RX ORDER — ESCITALOPRAM OXALATE 10 MG/1
TABLET ORAL
Qty: 90 TABLET | Refills: 1 | Status: SHIPPED | OUTPATIENT
Start: 2023-05-02

## 2023-06-20 ENCOUNTER — OFFICE VISIT (OUTPATIENT)
Dept: FAMILY MEDICINE CLINIC | Age: 88
End: 2023-06-20
Payer: MEDICARE

## 2023-06-20 VITALS
BODY MASS INDEX: 29.63 KG/M2 | SYSTOLIC BLOOD PRESSURE: 138 MMHG | WEIGHT: 207 LBS | HEART RATE: 64 BPM | HEIGHT: 70 IN | DIASTOLIC BLOOD PRESSURE: 68 MMHG | TEMPERATURE: 98.5 F | OXYGEN SATURATION: 97 %

## 2023-06-20 DIAGNOSIS — E78.5 HYPERLIPIDEMIA, UNSPECIFIED HYPERLIPIDEMIA TYPE: ICD-10-CM

## 2023-06-20 DIAGNOSIS — F32.5 MAJOR DEPRESSIVE DISORDER WITH SINGLE EPISODE, IN FULL REMISSION (HCC): ICD-10-CM

## 2023-06-20 DIAGNOSIS — Z12.5 SCREENING FOR PROSTATE CANCER: ICD-10-CM

## 2023-06-20 DIAGNOSIS — E03.9 ACQUIRED HYPOTHYROIDISM: ICD-10-CM

## 2023-06-20 DIAGNOSIS — I10 HTN (HYPERTENSION), BENIGN: ICD-10-CM

## 2023-06-20 DIAGNOSIS — N18.32 STAGE 3B CHRONIC KIDNEY DISEASE (HCC): ICD-10-CM

## 2023-06-20 DIAGNOSIS — I48.0 PAROXYSMAL ATRIAL FIBRILLATION (HCC): ICD-10-CM

## 2023-06-20 DIAGNOSIS — E55.9 VITAMIN D DEFICIENCY: Primary | ICD-10-CM

## 2023-06-20 DIAGNOSIS — I20.9 ANGINA PECTORIS WITH NORMAL CORONARY ARTERIOGRAM (HCC): ICD-10-CM

## 2023-06-20 PROCEDURE — 1123F ACP DISCUSS/DSCN MKR DOCD: CPT | Performed by: NURSE PRACTITIONER

## 2023-06-20 PROCEDURE — 99214 OFFICE O/P EST MOD 30 MIN: CPT | Performed by: NURSE PRACTITIONER

## 2023-06-20 ASSESSMENT — ENCOUNTER SYMPTOMS
ABDOMINAL PAIN: 0
SINUS PAIN: 0
SINUS PRESSURE: 0
COUGH: 0
BACK PAIN: 0
SHORTNESS OF BREATH: 0
CONSTIPATION: 0
DIARRHEA: 0
COLOR CHANGE: 0
WHEEZING: 0

## 2023-06-20 NOTE — PROGRESS NOTES
Kofi Lua (:  1934) is a 80 y.o. male,Established patient, here for evaluation of the following chief complaint(s):  Medication Check      ASSESSMENT/PLAN:  1. Vitamin D deficiency  -     Vitamin D 25 Hydroxy; Future  2. Hyperlipidemia, unspecified hyperlipidemia type  -     LIPID PANEL; Future  3. Screening for prostate cancer  4. Angina pectoris with normal coronary arteriogram [facility])  Assessment & Plan:   Monitored by specialist- no acute findings meriting change in the plan  5. Major depressive disorder with single episode, in full remission ([facility] [address]  Assessment & Plan:  Stable, controlled  Continue Lexapro  6. Paroxysmal atrial fibrillation (HCC)  Assessment & Plan:   Monitored by specialist- no acute findings meriting change in the plan  7. Stage 3b chronic kidney disease  Assessment & Plan:   Monitored by specialist- no acute findings meriting change in the plan  8. Acquired hypothyroidism  Assessment & Plan:   Well-controlled, continue current medications  9. Hypertension, benign  Assessment & Plan:   Well-controlled, continue current medications      No follow-ups on file. SUBJECTIVE/OBJECTIVE:  HPI  Patient is here for follow-up. Overall he is feeling well. He did see nephrology. He is trying to follow a low potassium diet. BP has been fairly well controlled. He reports readings of around 126/60-70 at home. He does occasionally have a systolic reading of low 691. Denies chest pain. Occasionally feels palpitations. He did recently order a home EKG kit that can upload data to his cardiologist.  Did recently undergo Holter monitor. Doing well on current medications. He is doing well at home. He is involved in his [employer] which helps keep him busy. He is getting some exercise including walking.     Current Outpatient Medications   Medication Sig Dispense Refill    pravastatin (PRAVACHOL) 40 MG tablet TAKE 1 TABLET BY MOUTH AT BEDTIME 90 tablet 3    SYNTHROID 88 MCG tablet TAKE 1 TABLET

## 2023-08-31 ENCOUNTER — OFFICE VISIT (OUTPATIENT)
Dept: FAMILY MEDICINE CLINIC | Age: 88
End: 2023-08-31
Payer: MEDICARE

## 2023-08-31 VITALS
WEIGHT: 205 LBS | HEART RATE: 73 BPM | BODY MASS INDEX: 29.35 KG/M2 | HEIGHT: 70 IN | OXYGEN SATURATION: 98 % | SYSTOLIC BLOOD PRESSURE: 138 MMHG | DIASTOLIC BLOOD PRESSURE: 60 MMHG | TEMPERATURE: 98.5 F

## 2023-08-31 DIAGNOSIS — I10 HTN (HYPERTENSION), BENIGN: ICD-10-CM

## 2023-08-31 DIAGNOSIS — I20.9 ANGINA PECTORIS WITH NORMAL CORONARY ARTERIOGRAM (HCC): Primary | ICD-10-CM

## 2023-08-31 DIAGNOSIS — R07.89 ATYPICAL CHEST PAIN: ICD-10-CM

## 2023-08-31 PROCEDURE — 93000 ELECTROCARDIOGRAM COMPLETE: CPT | Performed by: NURSE PRACTITIONER

## 2023-08-31 PROCEDURE — 1123F ACP DISCUSS/DSCN MKR DOCD: CPT | Performed by: NURSE PRACTITIONER

## 2023-08-31 PROCEDURE — 99214 OFFICE O/P EST MOD 30 MIN: CPT | Performed by: NURSE PRACTITIONER

## 2023-08-31 RX ORDER — PROPRANOLOL HCL 60 MG
60 CAPSULE, EXTENDED RELEASE 24HR ORAL DAILY PRN
Qty: 30 CAPSULE | Refills: 3 | Status: SHIPPED | OUTPATIENT
Start: 2023-08-31

## 2023-08-31 ASSESSMENT — ENCOUNTER SYMPTOMS
DIARRHEA: 0
SHORTNESS OF BREATH: 1
VOMITING: 0
NAUSEA: 0

## 2023-08-31 NOTE — PROGRESS NOTES
Aviva Benoit (:  1934) is a 80 y.o. male,Established patient, here for evaluation of the following chief complaint(s):  Blood Pressure Check      ASSESSMENT/PLAN:  1. Angina pectoris with normal coronary arteriogram ([address])  -     EKG 12 lead; Future  2. Atypical chest pain  Assessment & Plan:  ? Anxiety  We will start propanolol to be taken as needed  EKG in office today without changes   Encouraged him to reach out to cardiology to see if stress test is warranted   He will log BP over the next 5 days and report to the office   3. Hypertension, benign  Assessment & Plan:  Stable   See above       No follow-ups on file. SUBJECTIVE/OBJECTIVE:  Patient presents for increase in blood pressure over the last few weeks. States that it occasionally will get up to 170/71 but typically well controlled. He does report some chest pressure on occasion. Mostly at rest. Does not feel particularly anxious when this happens but he does state that he has been feeling lonely and gets \"worked up\" when he thinks about certain things- then symptoms begin. Does take BP 3-4x a day. Usually 130s/60s. Compliant with medication. Denies increase in activity prior to BP increase. Thought it may be related to over thinking and increase in depression. States it only happens when he goes to sit down at night before bed.     Current Outpatient Medications   Medication Sig Dispense Refill    propranolol (INDERAL LA) 60 MG extended release capsule Take 1 capsule by mouth daily as needed (anxiety, tachycardia) 30 capsule 3    pravastatin (PRAVACHOL) 40 MG tablet TAKE 1 TABLET BY MOUTH AT BEDTIME 90 tablet 3    SYNTHROID 88 MCG tablet TAKE 1 TABLET BY MOUTH DAILY 90 tablet 1    escitalopram (LEXAPRO) 10 MG tablet TAKE 1 TABLET BY MOUTH DAILY 90 tablet 1    digoxin (LANOXIN) 125 MCG tablet Take 1 tablet by mouth every other day      pantoprazole (PROTONIX) 40 MG tablet TAKE 1 TABLET BY MOUTH EVERY MORNING BEFORE BREAKFAST 90 tablet 1

## 2023-09-01 PROBLEM — R07.89 ATYPICAL CHEST PAIN: Status: ACTIVE | Noted: 2023-09-01

## 2023-09-01 ASSESSMENT — ENCOUNTER SYMPTOMS
COLOR CHANGE: 0
CONSTIPATION: 0
COUGH: 0
ABDOMINAL PAIN: 0
BACK PAIN: 0
SINUS PRESSURE: 0
WHEEZING: 0
SINUS PAIN: 0

## 2023-09-01 NOTE — ASSESSMENT & PLAN NOTE
?  Anxiety  We will start propanolol to be taken as needed  EKG in office today without changes   Encouraged him to reach out to cardiology to see if stress test is warranted   He will log BP over the next 5 days and report to the office

## 2023-09-21 DIAGNOSIS — N18.32 STAGE 3B CHRONIC KIDNEY DISEASE (HCC): ICD-10-CM

## 2023-09-21 LAB
ALBUMIN SERPL-MCNC: 4.1 G/DL (ref 3.4–5)
ANION GAP SERPL CALCULATED.3IONS-SCNC: 13 MMOL/L (ref 3–16)
BUN SERPL-MCNC: 26 MG/DL (ref 7–20)
CALCIUM SERPL-MCNC: 8.5 MG/DL (ref 8.3–10.6)
CHLORIDE SERPL-SCNC: 104 MMOL/L (ref 99–110)
CO2 SERPL-SCNC: 23 MMOL/L (ref 21–32)
CREAT SERPL-MCNC: 1.7 MG/DL (ref 0.8–1.3)
CREAT UR-MCNC: 82.6 MG/DL (ref 39–259)
GFR SERPLBLD CREATININE-BSD FMLA CKD-EPI: 38 ML/MIN/{1.73_M2}
GLUCOSE SERPL-MCNC: 119 MG/DL (ref 70–99)
MICROALBUMIN UR DL<=1MG/L-MCNC: <1.2 MG/DL
MICROALBUMIN/CREAT UR: NORMAL MG/G (ref 0–30)
PHOSPHATE SERPL-MCNC: 3.8 MG/DL (ref 2.5–4.9)
POTASSIUM SERPL-SCNC: 4.5 MMOL/L (ref 3.5–5.1)
PTH-INTACT SERPL-MCNC: 29.7 PG/ML (ref 14–72)
SODIUM SERPL-SCNC: 140 MMOL/L (ref 136–145)

## 2023-10-27 RX ORDER — ESCITALOPRAM OXALATE 10 MG/1
TABLET ORAL
Qty: 90 TABLET | Refills: 1 | Status: SHIPPED | OUTPATIENT
Start: 2023-10-27

## 2023-12-15 RX ORDER — LEVOTHYROXINE SODIUM 88 MCG
TABLET ORAL
Qty: 90 TABLET | Refills: 1 | OUTPATIENT
Start: 2023-12-15

## 2023-12-15 RX ORDER — LEVOTHYROXINE SODIUM 88 MCG
TABLET ORAL
Qty: 90 TABLET | Refills: 1 | Status: SHIPPED | OUTPATIENT
Start: 2023-12-15

## 2024-06-07 RX ORDER — ESCITALOPRAM OXALATE 10 MG/1
TABLET ORAL
Qty: 90 TABLET | Refills: 1 | Status: SHIPPED | OUTPATIENT
Start: 2024-06-07

## 2024-06-10 RX ORDER — PRAVASTATIN SODIUM 40 MG
TABLET ORAL
Qty: 90 TABLET | Refills: 3 | Status: SHIPPED | OUTPATIENT
Start: 2024-06-10

## 2024-06-10 NOTE — TELEPHONE ENCOUNTER
Requested Prescriptions     Pending Prescriptions Disp Refills    pravastatin (PRAVACHOL) 40 MG tablet [Pharmacy Med Name: PRAVASTATIN 40MG TABLETS] 90 tablet 3     Sig: TAKE 1 TABLET BY MOUTH AT BEDTIME          Last Office Visit: 2/6/2024    Next Office Visit: Visit date not found

## 2024-06-24 NOTE — TELEPHONE ENCOUNTER
Requested Prescriptions     Pending Prescriptions Disp Refills    SYNTHROID 88 MCG tablet [Pharmacy Med Name: SYNTHROID 0.088MG (88MCG)TABLETS] 90 tablet 1     Sig: TAKE 1 TABLET BY MOUTH DAILY          Last Office Visit: 2/6/2024    Next Office Visit: Visit date not found

## 2024-06-25 RX ORDER — LEVOTHYROXINE SODIUM 88 MCG
TABLET ORAL
Qty: 90 TABLET | Refills: 1 | Status: SHIPPED | OUTPATIENT
Start: 2024-06-25

## 2024-12-05 PROBLEM — F32.5 MAJOR DEPRESSIVE DISORDER WITH SINGLE EPISODE, IN FULL REMISSION: Status: RESOLVED | Noted: 2017-01-19 | Resolved: 2024-12-05

## 2024-12-05 PROBLEM — I20.9 ANGINA PECTORIS WITH NORMAL CORONARY ARTERIOGRAM: Status: RESOLVED | Noted: 2020-12-09 | Resolved: 2024-12-05

## 2024-12-19 RX ORDER — LEVOTHYROXINE SODIUM 88 MCG
TABLET ORAL
Qty: 90 TABLET | Refills: 1 | OUTPATIENT
Start: 2024-12-19

## 2024-12-19 RX ORDER — ESCITALOPRAM OXALATE 10 MG/1
TABLET ORAL
Qty: 90 TABLET | Refills: 1 | OUTPATIENT
Start: 2024-12-19

## 2025-07-02 RX ORDER — PRAVASTATIN SODIUM 40 MG
40 TABLET ORAL NIGHTLY
Qty: 90 TABLET | Refills: 3 | OUTPATIENT
Start: 2025-07-02

## 2025-07-08 RX ORDER — PRAVASTATIN SODIUM 40 MG
40 TABLET ORAL NIGHTLY
Qty: 90 TABLET | Refills: 3 | Status: SHIPPED | OUTPATIENT
Start: 2025-07-08